# Patient Record
Sex: FEMALE | Race: WHITE | Employment: OTHER | URBAN - METROPOLITAN AREA
[De-identification: names, ages, dates, MRNs, and addresses within clinical notes are randomized per-mention and may not be internally consistent; named-entity substitution may affect disease eponyms.]

---

## 2017-06-06 RX ORDER — METFORMIN HYDROCHLORIDE 500 MG/1
TABLET ORAL
Qty: 180 TAB | Refills: 3 | Status: SHIPPED | OUTPATIENT
Start: 2017-06-06 | End: 2018-07-01 | Stop reason: SDUPTHER

## 2017-06-23 ENCOUNTER — HOSPITAL ENCOUNTER (OUTPATIENT)
Dept: LAB | Age: 72
Discharge: HOME | End: 2017-06-23
Payer: MEDICARE

## 2017-06-23 ENCOUNTER — OFFICE VISIT (OUTPATIENT)
Dept: FAMILY MEDICINE CLINIC | Age: 72
End: 2017-06-23

## 2017-06-23 VITALS
HEART RATE: 89 BPM | TEMPERATURE: 98.2 F | RESPIRATION RATE: 20 BRPM | BODY MASS INDEX: 26.84 KG/M2 | OXYGEN SATURATION: 98 % | SYSTOLIC BLOOD PRESSURE: 138 MMHG | DIASTOLIC BLOOD PRESSURE: 82 MMHG | HEIGHT: 66 IN | WEIGHT: 167 LBS

## 2017-06-23 DIAGNOSIS — E78.5 HYPERLIPIDEMIA, UNSPECIFIED HYPERLIPIDEMIA TYPE: ICD-10-CM

## 2017-06-23 DIAGNOSIS — F51.05 INSOMNIA SECONDARY TO ANXIETY: ICD-10-CM

## 2017-06-23 DIAGNOSIS — I10 ESSENTIAL HYPERTENSION: ICD-10-CM

## 2017-06-23 DIAGNOSIS — F41.9 INSOMNIA SECONDARY TO ANXIETY: ICD-10-CM

## 2017-06-23 DIAGNOSIS — E11.9 TYPE 2 DIABETES MELLITUS WITHOUT COMPLICATION, WITHOUT LONG-TERM CURRENT USE OF INSULIN (HCC): Primary | ICD-10-CM

## 2017-06-23 LAB — HBA1C MFR BLD HPLC: 7 %

## 2017-06-23 PROCEDURE — 36415 COLL VENOUS BLD VENIPUNCTURE: CPT

## 2017-06-23 PROCEDURE — 85025 COMPLETE CBC W/AUTO DIFF WBC: CPT

## 2017-06-23 PROCEDURE — 80053 COMPREHEN METABOLIC PANEL: CPT

## 2017-06-23 PROCEDURE — 84443 ASSAY THYROID STIM HORMONE: CPT

## 2017-06-23 PROCEDURE — 80061 LIPID PANEL: CPT

## 2017-06-23 RX ORDER — ALPRAZOLAM 0.5 MG/1
0.5 TABLET ORAL
Qty: 30 TAB | Refills: 2 | Status: SHIPPED | OUTPATIENT
Start: 2017-06-23 | End: 2017-11-12 | Stop reason: SDUPTHER

## 2017-06-23 NOTE — PROGRESS NOTES
HPI:  67 y.o.  presents for follow up appointment. No hospital, ER or specialist visits since last primary care visit except as noted below. Patient Active Problem List    Diagnosis    Insomnia secondary to anxiety - does ok on unisom or xanax, but never together.  Diverticulosis - no recent flares.  Diabetes mellitus ([facility] [address] - Checks blood glucose 2-3 times a week and less than 140. Takes medications as directed. No polyuria/polydipsia. Opthalmology appointment due in August.  Checks feet, and no sores noted. No tingling or numbness in feet.  Hyperlipidemia - Takes medication at night as directed, no muscle aches. Tries to watch diet.  Hypertension - No home monitoring. Compliant with medication. No shortness of breath, no chest pain, no vision change, no headache, no lower extremity edema. Past Medical History:   Diagnosis Date    Diabetes ([facility] [address]     Hypercholesterolemia     Hypertension        Social History   Substance Use Topics    Smoking status: Never Smoker    Smokeless tobacco: Never Used    Alcohol use No       Outpatient Prescriptions Marked as Taking for the 6/23/17 encounter (Office Visit) with Hector Souza MD   Medication Sig Dispense Refill    ALPRAZolam Azeem Graces) 0.5 mg tablet Take 1 Tab by mouth nightly as needed for Anxiety. 30 Tab 2    metFORMIN (GLUCOPHAGE) 500 mg tablet TAKE 1 TAB BY MOUTH TWO (2) TIMES DAILY (WITH MEALS). 180 Tab 3    glipiZIDE (GLUCOTROL) 5 mg tablet TAKE 1 TABLET BY MOUTH DAILY 90 Tab 4    simvastatin (ZOCOR) 20 mg tablet TAKE 1 TABLET BY MOUTH NIGHTLY 90 Tab 4    glucose blood VI test strips (ACCU-CHEK MARY GRACE) strip Diagnosis E11.65. Pt is testing once daily. 100 Strip 1    ramipril (ALTACE) 10 mg capsule Take 1 Cap by mouth daily. 90 Cap 4       No Known Allergies    ROS:  ROS negative except as per HPI.       PE:  Visit Vitals    /82    Pulse 89    Temp 98.2 °F (36.8 °C) (Oral)    Resp 20    Ht 5' 6\" (1.676 m)  Wt 167 lb (75.8 kg)    LMP  (LMP Unknown)    SpO2 98%    BMI 26.95 kg/m2     Gen: alert, oriented, no acute distress  Head: normocephalic, atraumatic  Ears: external auditory canals clear, TMs without erythema or effusion  Eyes: pupils equal round reactive to light, sclera clear, conjunctiva clear  Oral: moist mucus membranes, no oral lesions, no pharyngeal inflammation or exudate  Neck: symmetric normal sized thyroid, no carotid bruits, no jugular vein distention  Resp: no increase work of breathing, lungs clear to ausculation bilaterally, no wheezing, rales or rhonchi  CV: S1, S2 normal.  No murmurs, rubs, or gallops. Abd: soft, not tender, not distended. No hepatosplenomegaly. Normal bowel sounds. Neuro: cranial nerves intact, normal strength and movement in all extremities, reflexes and sensation intact and symmetric. Skin: no lesion or rash  Extremities: no cyanosis or edema    Result Value Ref Range    Hemoglobin A1c (POC) 7.0 %       Assessment/Plan:    1. Type 2 diabetes mellitus without complication, without long-term current use of insulin (HCC)  A1C at upper end of goal, advised to work on diet and exercise habits, no changes to medications. - AMB POC HEMOGLOBIN A1C    2. Hyperlipidemia, unspecified hyperlipidemia type  No changes in medications pending lab results. - METABOLIC PANEL, COMPREHENSIVE  - LIPID PANEL    3. Essential hypertension  At goal.  Continue current medications. - CBC WITH AUTOMATED DIFF  - TSH 3RD GENERATION    4. Insomnia secondary to anxiety  Well controlled on current medications, no changes. No signs of abuse, overuse, misuse or diversion of benzodiazepine.  reviewed. - ALPRAZolam (XANAX) 0.5 mg tablet; Take 1 Tab by mouth nightly as needed for Anxiety. Dispense: 30 Tab; Refill: 2      Health Maintenance reviewed - updated.     Orders Placed This Encounter    METABOLIC PANEL, COMPREHENSIVE    CBC WITH AUTOMATED DIFF    LIPID PANEL    TSH 3RD GENERATION    CVD REPORT    AMB POC HEMOGLOBIN A1C    ALPRAZolam (XANAX) 0.5 mg tablet     Sig: Take 1 Tab by mouth nightly as needed for Anxiety. Dispense:  30 Tab     Refill:  2       Medications Discontinued During This Encounter   Medication Reason    ALPRAZolam (XANAX) 0.5 mg tablet Reorder       Current Outpatient Prescriptions   Medication Sig Dispense Refill    ALPRAZolam (XANAX) 0.5 mg tablet Take 1 Tab by mouth nightly as needed for Anxiety. 30 Tab 2    metFORMIN (GLUCOPHAGE) 500 mg tablet TAKE 1 TAB BY MOUTH TWO (2) TIMES DAILY (WITH MEALS). 180 Tab 3    glipiZIDE (GLUCOTROL) 5 mg tablet TAKE 1 TABLET BY MOUTH DAILY 90 Tab 4    simvastatin (ZOCOR) 20 mg tablet TAKE 1 TABLET BY MOUTH NIGHTLY 90 Tab 4    glucose blood VI test strips (ACCU-CHEK MARY GRACE) strip Diagnosis E11.65. Pt is testing once daily. 100 Strip 1    ramipril (ALTACE) 10 mg capsule Take 1 Cap by mouth daily. 90 Cap 4       Recommended healthy diet low in carbohydrates, fats, sodium and cholesterol. Recommended regular cardiovascular exercise 3-6 times per week for 30-60 minutes daily. Verbal and written instructions (see AVS) provided. Patient expresses understanding of diagnosis and treatment plan.

## 2017-06-23 NOTE — PATIENT INSTRUCTIONS

## 2017-06-23 NOTE — MR AVS SNAPSHOT
Visit Information Date & Time Provider Department Dept. Phone Encounter #  
 6/23/2017 10:45 AM Tg Camp, [address] [facility] [phone] [id number] Upcoming Health Maintenance Date Due  
 LIPID PANEL Q1 4/19/2017 HEMOGLOBIN A1C Q6M 5/8/2017 INFLUENZA AGE 9 TO ADULT 8/1/2017 EYE EXAM RETINAL OR DILATED Q1 8/16/2017 FOOT EXAM Q1 11/8/2017 MICROALBUMIN Q1 11/8/2017 MEDICARE YEARLY EXAM 11/9/2017 BREAST CANCER SCRN MAMMOGRAM 11/28/2017 GLAUCOMA SCREENING Q2Y 8/16/2018 COLONOSCOPY 6/25/2019 DTaP/Tdap/Td series (2 - Td) 4/19/2025 Allergies as of 6/23/2017  Review Complete On: 6/23/2017 By: Tg Camp MD  
 No Known Allergies Current Immunizations  Reviewed on 4/20/2016 Name Date Influenza High Dose Vaccine PF 10/31/2016 Influenza Vaccine 10/14/2015, 10/3/2013 Pneumococcal Conjugate (PCV-13) 5/18/2015 Pneumococcal Vaccine (Unspecified Type) 7/15/2011 Tdap 4/19/2015 Zoster Vaccine, Live 7/15/2011 Not reviewed this visit You Were Diagnosed With   
  
 Codes Comments Type 2 diabetes mellitus without complication, without long-term current use of insulin (HCC)    -  Primary ICD-10-CM: E11.9 ICD-9-CM: 250.00 Hyperlipidemia, unspecified hyperlipidemia type     ICD-10-CM: E78.5 ICD-9-CM: 272.4 Essential hypertension     ICD-10-CM: I10 
ICD-9-CM: 401.9 Insomnia secondary to anxiety     ICD-10-CM: F41.9, F51.05 
ICD-9-CM: 300.00, 327.02 Vitals BP Pulse Temp Resp Height(growth percentile) Weight(growth percentile) 138/82 89 98.2 °F (36.8 °C) (Oral) 20 5' 6\" (1.676 m) 167 lb (75.8 kg) LMP SpO2 BMI OB Status Smoking Status (LMP Unknown) 98% 26.95 kg/m2 Hysterectomy Never Smoker Vitals History BMI and BSA Data Body Mass Index Body Surface Area  
 26.95 kg/m 2 1.88 m 2 Preferred Pharmacy Pharmacy Name Phone [id number] [facility] [phone] Your Updated Medication List  
  
   
This list is accurate as of: 6/23/17 11:30 AM.  Always use your most recent med list.  
  
  
  
  
 ALPRAZolam 0.5 mg tablet Commonly known as:  Shabnam Dylan Take 1 Tab by mouth nightly as needed for Anxiety. glipiZIDE 5 mg tablet Commonly known as:  GLUCOTROL  
TAKE 1 TABLET BY MOUTH DAILY  
  
 glucose blood VI test strips strip Commonly known as:  ACCU-CHEK MARY GRACE Diagnosis E11.65. Pt is testing once daily. metFORMIN 500 mg tablet Commonly known as:  GLUCOPHAGE  
TAKE 1 TAB BY MOUTH TWO (2) TIMES DAILY (WITH MEALS). ramipril 10 mg capsule Commonly known as:  ALTACE Take 1 Cap by mouth daily. simvastatin 20 mg tablet Commonly known as:  ZOCOR  
TAKE 1 TABLET BY MOUTH NIGHTLY Prescriptions Printed Refills ALPRAZolam (XANAX) 0.5 mg tablet 2 Sig: Take 1 Tab by mouth nightly as needed for Anxiety. Class: Print Route: Oral  
  
We Performed the Following AMB POC HEMOGLOBIN A1C [59078 CPT(R)] CBC WITH AUTOMATED DIFF [96365 CPT(R)] LIPID PANEL [64497 CPT(R)] METABOLIC PANEL, COMPREHENSIVE [03442 CPT(R)] TSH 3RD GENERATION [75527 CPT(R)] Patient Instructions Learning About Diabetes Food Guidelines Your Care Instructions Meal planning is important to manage diabetes. It helps keep your blood sugar at a target level (which you set with your doctor). You don't have to eat special foods. You can eat what your family eats, including sweets once in a while. But you do have to pay attention to how often you eat and how much you eat of certain foods. You may want to work with a dietitian or a certified diabetes educator (CDE) to help you plan meals and snacks. A dietitian or CDE can also help you lose weight if that is one of your goals. What should you know about eating carbs? Managing the amount of carbohydrate (carbs) you eat is an important part of healthy meals when you have diabetes. Carbohydrate is found in many foods. · Learn which foods have carbs. And learn the amounts of carbs in different foods. ¨ Bread, cereal, pasta, and rice have about 15 grams of carbs in a serving. A serving is 1 slice of bread (1 ounce), ½ cup of cooked cereal, or 1/3 cup of cooked pasta or rice. ¨ Fruits have 15 grams of carbs in a serving. A serving is 1 small fresh fruit, such as an apple or orange; ½ of a banana; ½ cup of cooked or canned fruit; ½ cup of fruit juice; 1 cup of melon or raspberries; or 2 tablespoons of dried fruit. ¨ Milk and no-sugar-added yogurt have 15 grams of carbs in a serving. A serving is 1 cup of milk or 2/3 cup of no-sugar-added yogurt. ¨ Starchy vegetables have 15 grams of carbs in a serving. A serving is ½ cup of mashed potatoes or sweet potato; 1 cup winter squash; ½ of a small baked potato; ½ cup of cooked beans; or ½ cup cooked corn or green peas. · Learn how much carbs to eat each day and at each meal. A dietitian or CDE can teach you how to keep track of the amount of carbs you eat. This is called carbohydrate counting. · If you are not sure how to count carbohydrate grams, use the Plate Method to plan meals. It is a good, quick way to make sure that you have a balanced meal. It also helps you spread carbs throughout the day. ¨ Divide your plate by types of foods. Put non-starchy vegetables on half the plate, meat or other protein food on one-quarter of the plate, and a grain or starchy vegetable in the final quarter of the plate.  To this you can add a small piece of fruit and 1 cup of milk or yogurt, depending on how many carbs you are supposed to eat at a meal. 
· Try to eat about the same amount of carbs at each meal. Do not \"save up\" your daily allowance of carbs to eat at one meal. 
 · Proteins have very little or no carbs per serving. Examples of proteins are beef, chicken, turkey, fish, eggs, tofu, cheese, cottage cheese, and peanut butter. A serving size of meat is 3 ounces, which is about the size of a deck of cards. Examples of meat substitute serving sizes (equal to 1 ounce of meat) are 1/4 cup of cottage cheese, 1 egg, 1 tablespoon of peanut butter, and ½ cup of tofu. How can you eat out and still eat healthy? · Learn to estimate the serving sizes of foods that have carbohydrate. If you measure food at home, it will be easier to estimate the amount in a serving of restaurant food. · If the meal you order has too much carbohydrate (such as potatoes, corn, or baked beans), ask to have a low-carbohydrate food instead. Ask for a salad or green vegetables. · If you use insulin, check your blood sugar before and after eating out to help you plan how much to eat in the future. · If you eat more carbohydrate at a meal than you had planned, take a walk or do other exercise. This will help lower your blood sugar. What else should you know? · Limit saturated fat, such as the fat from meat and dairy products. This is a healthy choice because people who have diabetes are at higher risk of heart disease. So choose lean cuts of meat and nonfat or low-fat dairy products. Use olive or canola oil instead of butter or shortening when cooking. · Don't skip meals. Your blood sugar may drop too low if you skip meals and take insulin or certain medicines for diabetes. · Check with your doctor before you drink alcohol. Alcohol can cause your blood sugar to drop too low. Alcohol can also cause a bad reaction if you take certain diabetes medicines. Follow-up care is a key part of your treatment and safety. Be sure to make and go to all appointments, and call your doctor if you are having problems. It's also a good idea to know your test results and keep a list of the medicines you take. Where can you learn more? Go to http://enrique-jaspreet.info/. Enter F896 in the search box to learn more about \"Learning About Diabetes Food Guidelines. \" Current as of: March 13, 2017 Content Version: 11.3 © 7855-8720 [employer]. Care instructions adapted under license by [employer] (which disclaims liability or warranty for this information). If you have questions about a medical condition or this instruction, always ask your healthcare professional. [address] any warranty or liability for your use of this information. Introducing [facility] & HEALTH SERVICES! Dear Leonor Gray: 
Thank you for requesting a Uberpong account. Our records indicate that you already have an active Uberpong account. You can access your account anytime at [email or web address] Did you know that you can access your hospital and ER discharge instructions at any time in Uberpong? You can also review all of your test results from your hospital stay or ER visit. Additional Information If you have questions, please visit the Frequently Asked Questions section of the Uberpong website at [email or web address] Remember, Uberpong is NOT to be used for urgent needs. For medical emergencies, dial 911. Now available from your iPhone and Android! Please provide this summary of care documentation to your next provider. Your primary care clinician is listed as Juan Manuel Herzog. If you have any questions after today's visit, please call [phone].

## 2017-06-23 NOTE — PROGRESS NOTES
Chief Complaint   Patient presents with    Medication Evaluation     check up on her medications     Morning meds taken this Chasity Borja LPN

## 2017-06-24 LAB
ALBUMIN SERPL-MCNC: 4.5 G/DL (ref 3.5–4.8)
ALBUMIN/GLOB SERPL: 1.7 {RATIO} (ref 1.2–2.2)
ALP SERPL-CCNC: 80 IU/L (ref 39–117)
ALT SERPL-CCNC: 15 IU/L (ref 0–32)
AST SERPL-CCNC: 14 IU/L (ref 0–40)
BASOPHILS # BLD AUTO: 0 X10E3/UL (ref 0–0.2)
BASOPHILS NFR BLD AUTO: 1 %
BILIRUB SERPL-MCNC: 0.4 MG/DL (ref 0–1.2)
BUN SERPL-MCNC: 12 MG/DL (ref 8–27)
BUN/CREAT SERPL: 16 (ref 12–28)
CALCIUM SERPL-MCNC: 9.7 MG/DL (ref 8.7–10.3)
CHLORIDE SERPL-SCNC: 100 MMOL/L (ref 96–106)
CHOLEST SERPL-MCNC: 168 MG/DL (ref 100–199)
CO2 SERPL-SCNC: 22 MMOL/L (ref 18–29)
CREAT SERPL-MCNC: 0.76 MG/DL (ref 0.57–1)
EOSINOPHIL # BLD AUTO: 0.2 X10E3/UL (ref 0–0.4)
EOSINOPHIL NFR BLD AUTO: 4 %
ERYTHROCYTE [DISTWIDTH] IN BLOOD BY AUTOMATED COUNT: 12.7 % (ref 12.3–15.4)
GLOBULIN SER CALC-MCNC: 2.6 G/DL (ref 1.5–4.5)
GLUCOSE SERPL-MCNC: 160 MG/DL (ref 65–99)
HCT VFR BLD AUTO: 40.5 % (ref 34–46.6)
HDLC SERPL-MCNC: 43 MG/DL
HGB BLD-MCNC: 13.6 G/DL (ref 11.1–15.9)
IMM GRANULOCYTES # BLD: 0 X10E3/UL (ref 0–0.1)
IMM GRANULOCYTES NFR BLD: 0 %
INTERPRETATION, 910389: NORMAL
LDLC SERPL CALC-MCNC: 94 MG/DL (ref 0–99)
LYMPHOCYTES # BLD AUTO: 1.7 X10E3/UL (ref 0.7–3.1)
LYMPHOCYTES NFR BLD AUTO: 30 %
MCH RBC QN AUTO: 29.6 PG (ref 26.6–33)
MCHC RBC AUTO-ENTMCNC: 33.6 G/DL (ref 31.5–35.7)
MCV RBC AUTO: 88 FL (ref 79–97)
MONOCYTES # BLD AUTO: 0.3 X10E3/UL (ref 0.1–0.9)
MONOCYTES NFR BLD AUTO: 6 %
NEUTROPHILS # BLD AUTO: 3.4 X10E3/UL (ref 1.4–7)
NEUTROPHILS NFR BLD AUTO: 59 %
PLATELET # BLD AUTO: 232 X10E3/UL (ref 150–379)
POTASSIUM SERPL-SCNC: 4.7 MMOL/L (ref 3.5–5.2)
PROT SERPL-MCNC: 7.1 G/DL (ref 6–8.5)
RBC # BLD AUTO: 4.59 X10E6/UL (ref 3.77–5.28)
SODIUM SERPL-SCNC: 140 MMOL/L (ref 134–144)
TRIGL SERPL-MCNC: 155 MG/DL (ref 0–149)
TSH SERPL DL<=0.005 MIU/L-ACNC: 2.93 UIU/ML (ref 0.45–4.5)
VLDLC SERPL CALC-MCNC: 31 MG/DL (ref 5–40)
WBC # BLD AUTO: 5.7 X10E3/UL (ref 3.4–10.8)

## 2017-07-29 DIAGNOSIS — I10 ESSENTIAL HYPERTENSION WITH GOAL BLOOD PRESSURE LESS THAN 140/90: ICD-10-CM

## 2017-07-30 RX ORDER — RAMIPRIL 10 MG/1
CAPSULE ORAL
Qty: 90 CAP | Refills: 4 | Status: SHIPPED | OUTPATIENT
Start: 2017-07-30 | End: 2018-08-15 | Stop reason: SDUPTHER

## 2017-09-06 RX ORDER — SIMVASTATIN 20 MG/1
TABLET, FILM COATED ORAL
Qty: 90 TAB | Refills: 3 | Status: SHIPPED | OUTPATIENT
Start: 2017-09-06 | End: 2018-07-01 | Stop reason: SDUPTHER

## 2017-09-07 RX ORDER — GLIPIZIDE 5 MG/1
TABLET ORAL
Qty: 90 TAB | Refills: 3 | Status: SHIPPED | OUTPATIENT
Start: 2017-09-07 | End: 2018-07-01 | Stop reason: SDUPTHER

## 2017-11-12 DIAGNOSIS — F51.05 INSOMNIA SECONDARY TO ANXIETY: ICD-10-CM

## 2017-11-12 DIAGNOSIS — F41.9 INSOMNIA SECONDARY TO ANXIETY: ICD-10-CM

## 2017-11-13 RX ORDER — ALPRAZOLAM 0.5 MG/1
0.5 TABLET ORAL
Qty: 30 TAB | Refills: 2 | Status: SHIPPED | OUTPATIENT
Start: 2017-11-13 | End: 2018-03-10 | Stop reason: SDUPTHER

## 2017-11-13 NOTE — TELEPHONE ENCOUNTER
From: Donita Montes  To: Jeremy Foster MD  Sent: 11/12/2017 5:54 PM EST  Subject: Medication Renewal Request    Original authorizing provider: MD Donita Newberry would like a refill of the following medications:  ALPRAZolam Mehran Brisk) 0.5 mg tablet Jeremy Foster MD]    Preferred pharmacy: [facility]/PHARMACY #[id number] - [address] , [address] [place] [address]:

## 2018-01-15 ENCOUNTER — OFFICE VISIT (OUTPATIENT)
Dept: FAMILY MEDICINE CLINIC | Age: 73
End: 2018-01-15

## 2018-01-15 ENCOUNTER — HOSPITAL ENCOUNTER (OUTPATIENT)
Dept: LAB | Age: 73
Discharge: HOME | End: 2018-01-15
Payer: MEDICARE

## 2018-01-15 VITALS
SYSTOLIC BLOOD PRESSURE: 126 MMHG | HEART RATE: 87 BPM | RESPIRATION RATE: 17 BRPM | OXYGEN SATURATION: 97 % | WEIGHT: 170 LBS | TEMPERATURE: 97.9 F | BODY MASS INDEX: 27.32 KG/M2 | DIASTOLIC BLOOD PRESSURE: 72 MMHG | HEIGHT: 66 IN

## 2018-01-15 DIAGNOSIS — Z00.00 MEDICARE ANNUAL WELLNESS VISIT, SUBSEQUENT: Primary | ICD-10-CM

## 2018-01-15 DIAGNOSIS — E78.5 HYPERLIPIDEMIA, UNSPECIFIED HYPERLIPIDEMIA TYPE: ICD-10-CM

## 2018-01-15 DIAGNOSIS — I10 ESSENTIAL HYPERTENSION: ICD-10-CM

## 2018-01-15 DIAGNOSIS — E11.9 TYPE 2 DIABETES MELLITUS WITHOUT COMPLICATION, WITHOUT LONG-TERM CURRENT USE OF INSULIN (HCC): ICD-10-CM

## 2018-01-15 DIAGNOSIS — Z13.39 SCREENING FOR ALCOHOLISM: ICD-10-CM

## 2018-01-15 LAB
ALBUMIN UR QL STRIP: 10 MG/L
CREATININE, URINE POC: 10 MG/DL
HBA1C MFR BLD HPLC: 7.3 % (ref 4.8–5.6)
MICROALBUMIN/CREAT RATIO POC: NORMAL MG/G

## 2018-01-15 PROCEDURE — 84443 ASSAY THYROID STIM HORMONE: CPT

## 2018-01-15 PROCEDURE — 85025 COMPLETE CBC W/AUTO DIFF WBC: CPT

## 2018-01-15 PROCEDURE — 80061 LIPID PANEL: CPT

## 2018-01-15 PROCEDURE — 36415 COLL VENOUS BLD VENIPUNCTURE: CPT

## 2018-01-15 PROCEDURE — 80053 COMPREHEN METABOLIC PANEL: CPT

## 2018-01-15 NOTE — PATIENT INSTRUCTIONS

## 2018-01-15 NOTE — MR AVS SNAPSHOT
[address] [facility] 
 
 
 [address] 
[phone] Patient: Kevin Cr MRN: [id number]:[id number] Visit Information Date & Time Provider Department Dept. Phone Encounter #  
 1/15/2018  2:30 PM Evans Arvizu MD [address] [facility] [address] [phone] [id number] Follow-up Instructions Return in about 6 months (around 7/15/2018). Upcoming Health Maintenance Date Due  
 FOOT EXAM Q1 11/8/2017 MICROALBUMIN Q1 11/8/2017 MEDICARE YEARLY EXAM 11/9/2017 HEMOGLOBIN A1C Q6M 12/23/2017 LIPID PANEL Q1 6/23/2018 EYE EXAM RETINAL OR DILATED Q1 11/15/2018 BREAST CANCER SCRN MAMMOGRAM 11/30/2018 COLONOSCOPY 6/25/2019 GLAUCOMA SCREENING Q2Y 11/15/2019 DTaP/Tdap/Td series (2 - Td) 4/19/2025 Allergies as of 1/15/2018  Review Complete On: 1/15/2018 By: Evans Arvizu MD  
 No Known Allergies Current Immunizations  Reviewed on 11/20/2017 Name Date Influenza High Dose Vaccine PF 11/14/2017, 10/31/2016 Influenza Vaccine 10/14/2015, 10/3/2013 Pneumococcal Conjugate (PCV-13) 5/18/2015 Pneumococcal Vaccine (Unspecified Type) 7/15/2011 Tdap 4/19/2015 Zoster Vaccine, Live 7/15/2011 Not reviewed this visit You Were Diagnosed With   
  
 Codes Comments Medicare annual wellness visit, subsequent    -  Primary ICD-10-CM: Z00.00 ICD-9-CM: V70.0 Screening for alcoholism     ICD-10-CM: Z13.89 ICD-9-CM: V79.1 Type 2 diabetes mellitus without complication, without long-term current use of insulin (HCC)     ICD-10-CM: E11.9 ICD-9-CM: 250.00 Hyperlipidemia, unspecified hyperlipidemia type     ICD-10-CM: E78.5 ICD-9-CM: 272.4 Essential hypertension     ICD-10-CM: I10 
ICD-9-CM: 401.9 Vitals BP Pulse Temp Resp Height(growth percentile) Weight(growth percentile) 126/72 87 97.9 °F (36.6 °C) (Oral) 17 5' 6\" (1.676 m) 170 lb (77.1 kg) LMP SpO2 BMI OB Status Smoking Status (LMP Unknown) 97% 27.44 kg/m2 Hysterectomy Never Smoker Vitals History BMI and BSA Data Body Mass Index Body Surface Area  
 27.44 kg/m 2 1.89 m 2 Preferred Pharmacy Pharmacy Name Phone [id number] [facility] [phone] Your Updated Medication List  
  
   
This list is accurate as of: 1/15/18  3:26 PM.  Always use your most recent med list.  
  
  
  
  
 ALPRAZolam 0.5 mg tablet Commonly known as:  Blinda Scot Take 1 Tab by mouth nightly as needed for Anxiety. glipiZIDE 5 mg tablet Commonly known as:  GLUCOTROL  
TAKE 1 TABLET BY MOUTH DAILY  
  
 glucose blood VI test strips strip Commonly known as:  ACCU-CHEK MARY GRACE Diagnosis E11.65. Pt is testing once daily. metFORMIN 500 mg tablet Commonly known as:  GLUCOPHAGE  
TAKE 1 TAB BY MOUTH TWO (2) TIMES DAILY (WITH MEALS). ramipril 10 mg capsule Commonly known as:  ALTACE  
TAKE ONE CAPSULE BY MOUTH EVERY DAY  
  
 simvastatin 20 mg tablet Commonly known as:  ZOCOR  
TAKE 1 TABLET BY MOUTH NIGHTLY We Performed the Following AMB POC HEMOGLOBIN A1C [46816 CPT(R)] AMB POC URINE, MICROALBUMIN, SEMIQUANT (3 RESULTS) [68333 CPT(R)] CBC WITH AUTOMATED DIFF [17629 CPT(R)]  DIABETES FOOT EXAM [HM7 Custom] LIPID PANEL [66417 CPT(R)] METABOLIC PANEL, COMPREHENSIVE [50022 CPT(R)] [place] ANNUAL ALCOHOL SCREEN 15 MIN [id number] [facility]] TSH 3RD GENERATION [48918 CPT(R)] Follow-up Instructions Return in about 6 months (around 7/15/2018). Patient Instructions Medicare Wellness Visit, Female The best way to live healthy is to have a healthy lifestyle by eating a well-balanced diet, exercising regularly, limiting alcohol and stopping smoking.  
 
Regular physical exams and screening tests are another way to keep healthy. Preventive exams provided by your health care provider can find health problems before they become diseases or illnesses. Preventive services including immunizations, screening tests, monitoring and exams can help you take care of your own health. All people over age 72 should have a pneumovax  and and a prevnar shot to prevent pneumonia. These are once in a lifetime unless you and your provider decide differently. All people over 65 should have a yearly flu shot and a tetanus vaccine every 10 years. A bone mass density to screen for osteoporosis or thinning of the bones should be done every 2 years after 65. Screening for diabetes mellitus with a blood sugar test should be done every year. Glaucoma is a disease of the eye due to increased ocular pressure that can lead to blindness and it should be done every year by an eye professional. 
 
Cardiovascular screening tests that check for elevated lipids (fatty part of blood) which can lead to heart disease and strokes should be done every 5 years. Colorectal screening that evaluates for blood or polyps in your colon should be done yearly as a stool test or every five years as a flexible sigmoidoscope or every 10 years as a colonoscopy up to age 76. Breast cancer screening with a mammogram is recommended biennially  for women age 54-69. Screening for cervical cancer with a pap smear and pelvic exam is recommended for women after age 72 years every 2 years up to age 79 or when the provider and patient decide to stop. If there is a history of cervical abnormalities or other increased risk for cancer then the test is recommended yearly. Hepatitis C screening is also recommended for anyone born between [birth year] through [address]. A shingles vaccine is also recommended once in a lifetime after age 61. Your Medicare Wellness Exam is recommended annually. Here is a list of your current Health Maintenance items with a due date: Health Maintenance Due Topic Date Due  Up to date    Introducing [facility] & HEALTH SERVICES! Dear Tj Reach: 
Thank you for requesting a Occlutech account. Our records indicate that you already have an active Occlutech account. You can access your account anytime at [email or web address] Did you know that you can access your hospital and ER discharge instructions at any time in Occlutech? You can also review all of your test results from your hospital stay or ER visit. Additional Information If you have questions, please visit the Frequently Asked Questions section of the Occlutech website at [email or web address] Remember, Occlutech is NOT to be used for urgent needs. For medical emergencies, dial 911. Now available from your iPhone and Android! Please provide this summary of care documentation to your next provider. Your primary care clinician is listed as Marcelo Gomez. If you have any questions after today's visit, please call [phone].

## 2018-01-15 NOTE — PROGRESS NOTES
This is a Subsequent Medicare Annual Wellness Exam (AWV) (Performed 12 months after IPPE or effective date of Medicare Part B enrollment)    I have reviewed the patient's medical history in detail and updated the computerized patient record. History     Past Medical History:   Diagnosis Date    Diabetes ([facility] [address]     Hypercholesterolemia     Hypertension       Past Surgical History:   Procedure Laterality Date    HX CATARACT REMOVAL Bilateral 2015    HX CHOLECYSTECTOMY      HX DILATION AND CURETTAGE      HX HYSTERECTOMY      complete     Current Outpatient Prescriptions   Medication Sig Dispense Refill    ALPRAZolam (XANAX) 0.5 mg tablet Take 1 Tab by mouth nightly as needed for Anxiety. 30 Tab 2    glipiZIDE (GLUCOTROL) 5 mg tablet TAKE 1 TABLET BY MOUTH DAILY 90 Tab 3    simvastatin (ZOCOR) 20 mg tablet TAKE 1 TABLET BY MOUTH NIGHTLY 90 Tab 3    ramipril (ALTACE) 10 mg capsule TAKE ONE CAPSULE BY MOUTH EVERY DAY 90 Cap 4    metFORMIN (GLUCOPHAGE) 500 mg tablet TAKE 1 TAB BY MOUTH TWO (2) TIMES DAILY (WITH MEALS). 180 Tab 3    glucose blood VI test strips (ACCU-CHEK MARY GRACE) strip Diagnosis E11.65. Pt is testing once daily. 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     Functional Ability and Level of Safety:   Hearing Loss  Hearing is good. Activities of Daily Living  The home contains: no safety equipment. Fall Risk  Fall Risk Assessment, last 12 mths 1/15/2018   Able to walk? Yes   Fall in past 12 months? No       Abuse Screen  Patient is not abused    Cognitive Screening   Evaluation of Cognitive Function:  Has your family/caregiver stated any concerns about your memory: no      Patient Care Team   Patient Care Team:  Fifi Holland MD as PCP - General (Internal Medicine)    Assessment/Plan   Education and counseling provided:  Are appropriate based on today's review and evaluation  End-of-Life planning (with patient's consent)  Pneumococcal Vaccine  Influenza Vaccine  Colorectal cancer screening tests    Diagnoses and all orders for this visit:    1. Medicare annual wellness visit, subsequent    2. Screening for alcoholism  -     Annual  Alcohol Screen 15 min ()      Health Maintenance Due   Topic Date Due    FOOT EXAM Q1  11/08/2017    MICROALBUMIN Q1  11/08/2017    MEDICARE YEARLY EXAM  11/09/2017    HEMOGLOBIN A1C Q6M  12/23/2017         HPI:  Baylee Anguiano also presents for follow up of chronic conditions. Patient Active Problem List    Diagnosis    Insomnia secondary to anxiety - intermittently uses xanax    Diverticulosis    Diabetes mellitus ([facility] [address] - Checks blood glucose each morning and usually 130s. Takes medications as directed. No polyuria/polydipsia. Opthalmology appointment this year. Checks feet, and no sores noted. No tingling or numbness in feet.  Hyperlipidemia - Takes medication at night as directed, no muscle aches. Tries to watch diet.  Hypertension - No home monitoring. Compliant with medication. No shortness of breath, no chest pain, no vision change, no headache, no lower extremity edema. See Past Medical History, Surgical History, Social History, Medications, and Allergies documented above. ROS:  ROS negative except as per HPI.     PE:  Visit Vitals    /84 (BP 1 Location: Left arm, BP Patient Position: Sitting)    Pulse 87    Temp 97.9 °F (36.6 °C) (Oral)    Resp 17    Ht 5' 6\" (1.676 m)    Wt 170 lb (77.1 kg)    LMP  (LMP Unknown)    SpO2 97%    BMI 27.44 kg/m2     Gen: alert, oriented, no acute distress  Head: normocephalic, atraumatic  Ears: external auditory canals clear, TMs without erythema or effusion  Eyes: pupils equal round reactive to light, sclera clear, conjunctiva clear  Oral: moist mucus membranes, no oral lesions, no pharyngeal inflammation or exudate  Neck: symmetric normal sized thyroid, no carotid bruits, no jugular vein distention  Resp: no increase work of breathing, lungs clear to ausculation bilaterally, no wheezing, rales or rhonchi  CV: S1, S2 normal.  No murmurs, rubs, or gallops. Abd: soft, not tender, not distended. No hepatosplenomegaly. Normal bowel sounds. Neuro: cranial nerves intact, normal strength and movement in all extremities, reflexes and sensation intact and symmetric. Skin: lipoma mid back, sebaceous cyst mid back, nontender. Extremities: no cyanosis or edema    No foot deformities. No calluses or ulcers. Pulses intact. Reflexes intact. Sensation intact to microfilament. Normal proprioception. Results for orders placed or performed in visit on 01/15/18   AMB POC HEMOGLOBIN A1C   Result Value Ref Range    Hemoglobin A1c (POC) 7.3 (A) 4.8 - 5.6 %       Assessment/Plan:    3. Type 2 diabetes mellitus without complication, without long-term current use of insulin (HCC)  A little above goal, will get back on diet. Continue current medications. Microalbumin pending. Foot exam normal.  - AMB POC HEMOGLOBIN A1C  - AMB POC URINE, MICROALBUMIN, SEMIQUANT (3 RESULTS)    4. Hyperlipidemia, unspecified hyperlipidemia type  No changes in medications pending lab results. 5. Essential hypertension  At goal.  Continue current medications.      6. Lipoma and sebaceous cyst  Does not want them removed at this time. Orders Placed This Encounter    AMB POC HEMOGLOBIN A1C    AMB POC URINE, MICROALBUMIN, SEMIQUANT (3 RESULTS)    Annual  Alcohol Screen 15 min ()       There are no discontinued medications. Current Outpatient Prescriptions   Medication Sig Dispense Refill    ALPRAZolam (XANAX) 0.5 mg tablet Take 1 Tab by mouth nightly as needed for Anxiety. 30 Tab 2    glipiZIDE (GLUCOTROL) 5 mg tablet TAKE 1 TABLET BY MOUTH DAILY 90 Tab 3    simvastatin (ZOCOR) 20 mg tablet TAKE 1 TABLET BY MOUTH NIGHTLY 90 Tab 3    ramipril (ALTACE) 10 mg capsule TAKE ONE CAPSULE BY MOUTH EVERY DAY 90 Cap 4    metFORMIN (GLUCOPHAGE) 500 mg tablet TAKE 1 TAB BY MOUTH TWO (2) TIMES DAILY (WITH MEALS). 180 Tab 3    glucose blood VI test strips (ACCU-CHEK MARY GRACE) strip Diagnosis E11.65. Pt is testing once daily. 100 Strip 1       Recommended healthy diet low in carbohydrates, fats, sodium and cholesterol. Recommended regular cardiovascular exercise 3-6 times per week for 30-60 minutes daily. Verbal and written instructions (see AVS) provided. Patient expresses understanding of diagnosis and treatment plan.

## 2018-01-15 NOTE — PROGRESS NOTES
Chief Complaint   Patient presents with    Mass     on back and neck     Annual Wellness Visit       Health Maintenance reviewed

## 2018-01-16 LAB
ALBUMIN SERPL-MCNC: 4.6 G/DL (ref 3.5–4.8)
ALBUMIN/GLOB SERPL: 1.7 {RATIO} (ref 1.2–2.2)
ALP SERPL-CCNC: 90 IU/L (ref 39–117)
ALT SERPL-CCNC: 15 IU/L (ref 0–32)
AST SERPL-CCNC: 15 IU/L (ref 0–40)
BASOPHILS # BLD AUTO: 0 X10E3/UL (ref 0–0.2)
BASOPHILS NFR BLD AUTO: 1 %
BILIRUB SERPL-MCNC: 0.5 MG/DL (ref 0–1.2)
BUN SERPL-MCNC: 14 MG/DL (ref 8–27)
BUN/CREAT SERPL: 21 (ref 12–28)
CALCIUM SERPL-MCNC: 10 MG/DL (ref 8.7–10.3)
CHLORIDE SERPL-SCNC: 101 MMOL/L (ref 96–106)
CHOLEST SERPL-MCNC: 196 MG/DL (ref 100–199)
CO2 SERPL-SCNC: 26 MMOL/L (ref 18–29)
CREAT SERPL-MCNC: 0.66 MG/DL (ref 0.57–1)
EOSINOPHIL # BLD AUTO: 0.1 X10E3/UL (ref 0–0.4)
EOSINOPHIL NFR BLD AUTO: 2 %
ERYTHROCYTE [DISTWIDTH] IN BLOOD BY AUTOMATED COUNT: 12.6 % (ref 12.3–15.4)
GLOBULIN SER CALC-MCNC: 2.7 G/DL (ref 1.5–4.5)
GLUCOSE SERPL-MCNC: 112 MG/DL (ref 65–99)
HCT VFR BLD AUTO: 39.7 % (ref 34–46.6)
HDLC SERPL-MCNC: 45 MG/DL
HGB BLD-MCNC: 13.1 G/DL (ref 11.1–15.9)
IMM GRANULOCYTES # BLD: 0 X10E3/UL (ref 0–0.1)
IMM GRANULOCYTES NFR BLD: 0 %
INTERPRETATION, 910389: NORMAL
LDLC SERPL CALC-MCNC: 113 MG/DL (ref 0–99)
LYMPHOCYTES # BLD AUTO: 2.4 X10E3/UL (ref 0.7–3.1)
LYMPHOCYTES NFR BLD AUTO: 33 %
Lab: NORMAL
MCH RBC QN AUTO: 28.9 PG (ref 26.6–33)
MCHC RBC AUTO-ENTMCNC: 33 G/DL (ref 31.5–35.7)
MCV RBC AUTO: 87 FL (ref 79–97)
MONOCYTES # BLD AUTO: 0.4 X10E3/UL (ref 0.1–0.9)
MONOCYTES NFR BLD AUTO: 5 %
NEUTROPHILS # BLD AUTO: 4.4 X10E3/UL (ref 1.4–7)
NEUTROPHILS NFR BLD AUTO: 59 %
PLATELET # BLD AUTO: 305 X10E3/UL (ref 150–379)
POTASSIUM SERPL-SCNC: 5.3 MMOL/L (ref 3.5–5.2)
PROT SERPL-MCNC: 7.3 G/DL (ref 6–8.5)
RBC # BLD AUTO: 4.54 X10E6/UL (ref 3.77–5.28)
SODIUM SERPL-SCNC: 142 MMOL/L (ref 134–144)
TRIGL SERPL-MCNC: 192 MG/DL (ref 0–149)
TSH SERPL DL<=0.005 MIU/L-ACNC: 3.86 UIU/ML (ref 0.45–4.5)
VLDLC SERPL CALC-MCNC: 38 MG/DL (ref 5–40)
WBC # BLD AUTO: 7.3 X10E3/UL (ref 3.4–10.8)

## 2018-01-16 NOTE — PROGRESS NOTES
Labs look stable except cholesterol has climbed a little bet. Stay on your simvastatin.   Melva Castaneda MD

## 2018-01-17 ENCOUNTER — PATIENT MESSAGE (OUTPATIENT)
Dept: FAMILY MEDICINE CLINIC | Age: 73
End: 2018-01-17

## 2018-01-17 NOTE — TELEPHONE ENCOUNTER
From: Belkis Paz  To: Armin Louise MD  Sent: 1/17/2018 2:17 PM EST  Subject: Update Medical Information    Not sure if you have this info.  If not please update  Tetanus/whooping cough shot at [facility] 4-19-16  Secondary cataract surgery (right eye) 1-9-18 Dr. Albania Cerna  Secondary cataract surgery (left eye) will be done 1-23-18 Dr. Albania Cerna

## 2018-03-10 DIAGNOSIS — F41.9 INSOMNIA SECONDARY TO ANXIETY: ICD-10-CM

## 2018-03-10 DIAGNOSIS — F51.05 INSOMNIA SECONDARY TO ANXIETY: ICD-10-CM

## 2018-03-12 RX ORDER — ALPRAZOLAM 0.5 MG/1
0.5 TABLET ORAL
Qty: 30 TAB | Refills: 2 | Status: SHIPPED | OUTPATIENT
Start: 2018-03-12 | End: 2018-09-12 | Stop reason: SDUPTHER

## 2018-03-12 NOTE — TELEPHONE ENCOUNTER
From: Gracie Fontanez  To: Dore Bernheim, MD  Sent: 3/9/2018 10:22 PM EST  Subject: Medication Renewal Request    Original authorizing provider: Dore Bernheim, MD Lore Men would like a refill of the following medications:  glucose blood VI test strips (ACCU-CHEK MARY GRACE) strip Dore Bernheim, MD]    Preferred pharmacy: [facility]/PHARMACY #[id number] - [address] , [address] [place] [address]:

## 2018-03-12 NOTE — TELEPHONE ENCOUNTER
From: Dali Roach  To: Phyllis Pinto MD  Sent: 3/10/2018 12:42 AM EST  Subject: Medication Renewal Request    Original authorizing provider: MD Dali Flores would like a refill of the following medications:  ALPRAZolam Ernestene Links) 0.5 mg tablet Phyllis Pinto MD]    Preferred pharmacy: [facility]/PHARMACY #[id number] - [address], [address] [place] [address]:

## 2018-03-15 ENCOUNTER — TELEPHONE (OUTPATIENT)
Dept: FAMILY MEDICINE CLINIC | Age: 73
End: 2018-03-15

## 2018-03-15 DIAGNOSIS — E11.9 TYPE 2 DIABETES MELLITUS WITHOUT COMPLICATION, WITHOUT LONG-TERM CURRENT USE OF INSULIN (HCC): Primary | ICD-10-CM

## 2018-03-15 RX ORDER — BLOOD-GLUCOSE METER
EACH MISCELLANEOUS
Qty: 1 EACH | Refills: 0 | Status: SHIPPED | OUTPATIENT
Start: 2018-03-15

## 2018-03-15 NOTE — TELEPHONE ENCOUNTER
Pt needing a new script for a new meter and test strips as insurance is no longer covering accu-check. Scripts sent to [facility].

## 2018-07-02 RX ORDER — SIMVASTATIN 20 MG/1
TABLET, FILM COATED ORAL
Qty: 90 TAB | Refills: 0 | Status: SHIPPED | OUTPATIENT
Start: 2018-07-02 | End: 2018-11-13 | Stop reason: SDUPTHER

## 2018-07-02 RX ORDER — METFORMIN HYDROCHLORIDE 500 MG/1
TABLET ORAL
Qty: 180 TAB | Refills: 0 | Status: SHIPPED | OUTPATIENT
Start: 2018-07-02 | End: 2018-11-13 | Stop reason: SDUPTHER

## 2018-07-02 RX ORDER — GLIPIZIDE 5 MG/1
TABLET ORAL
Qty: 90 TAB | Refills: 0 | Status: SHIPPED | OUTPATIENT
Start: 2018-07-02 | End: 2018-11-13 | Stop reason: SDUPTHER

## 2018-08-15 DIAGNOSIS — I10 ESSENTIAL HYPERTENSION WITH GOAL BLOOD PRESSURE LESS THAN 140/90: ICD-10-CM

## 2018-08-16 RX ORDER — RAMIPRIL 10 MG/1
CAPSULE ORAL
Qty: 90 CAP | Refills: 0 | Status: SHIPPED | OUTPATIENT
Start: 2018-08-16 | End: 2018-11-13 | Stop reason: SDUPTHER

## 2018-09-12 DIAGNOSIS — F41.9 INSOMNIA SECONDARY TO ANXIETY: ICD-10-CM

## 2018-09-12 DIAGNOSIS — F51.05 INSOMNIA SECONDARY TO ANXIETY: ICD-10-CM

## 2018-09-12 NOTE — TELEPHONE ENCOUNTER
From: Kobe Arteaga  To: Kush Lopez MD  Sent: 9/12/2018 11:00 AM EDT  Subject: Medication Renewal Request    Original authorizing provider: MD Kobe Tejeda would like a refill of the following medications:  ALPRAZolam Stefany Snow) 0.5 mg tablet Kush Lopez MD]    Preferred pharmacy: [facility]/PHARMACY #[id number] - [address], [address] [place] [address]:

## 2018-09-12 NOTE — TELEPHONE ENCOUNTER
Last visit: 1/15/18 with Dr. Damir Brothers    Last refill: 3/12/18    : 3/13/18    2 refills had been remaining however, because it wasn't filled before , the rx has .

## 2018-09-13 ENCOUNTER — DOCUMENTATION ONLY (OUTPATIENT)
Dept: FAMILY MEDICINE CLINIC | Age: 73
End: 2018-09-13

## 2018-09-13 RX ORDER — ALPRAZOLAM 0.5 MG/1
0.5 TABLET ORAL
Qty: 30 TAB | Refills: 2 | Status: SHIPPED | OUTPATIENT
Start: 2018-09-13 | End: 2019-05-17 | Stop reason: SDUPTHER

## 2018-11-13 DIAGNOSIS — I10 ESSENTIAL HYPERTENSION WITH GOAL BLOOD PRESSURE LESS THAN 140/90: ICD-10-CM

## 2018-11-13 RX ORDER — METFORMIN HYDROCHLORIDE 500 MG/1
TABLET ORAL
Qty: 180 TAB | Refills: 0 | Status: SHIPPED | OUTPATIENT
Start: 2018-11-13 | End: 2019-02-09 | Stop reason: SDUPTHER

## 2018-11-13 RX ORDER — RAMIPRIL 10 MG/1
CAPSULE ORAL
Qty: 90 CAP | Refills: 1 | Status: SHIPPED | OUTPATIENT
Start: 2018-11-13 | End: 2019-05-09 | Stop reason: SDUPTHER

## 2018-11-13 RX ORDER — SIMVASTATIN 20 MG/1
TABLET, FILM COATED ORAL
Qty: 90 TAB | Refills: 0 | Status: SHIPPED | OUTPATIENT
Start: 2018-11-13 | End: 2019-02-09 | Stop reason: SDUPTHER

## 2018-11-13 RX ORDER — GLIPIZIDE 5 MG/1
TABLET ORAL
Qty: 90 TAB | Refills: 0 | Status: SHIPPED | OUTPATIENT
Start: 2018-11-13 | End: 2019-02-09 | Stop reason: SDUPTHER

## 2019-03-18 ENCOUNTER — OFFICE VISIT (OUTPATIENT)
Dept: INTERNAL MEDICINE CLINIC | Age: 74
End: 2019-03-18

## 2019-03-18 VITALS
RESPIRATION RATE: 16 BRPM | HEIGHT: 65 IN | TEMPERATURE: 97.9 F | HEART RATE: 102 BPM | WEIGHT: 163 LBS | DIASTOLIC BLOOD PRESSURE: 83 MMHG | BODY MASS INDEX: 27.16 KG/M2 | OXYGEN SATURATION: 99 % | SYSTOLIC BLOOD PRESSURE: 144 MMHG

## 2019-03-18 DIAGNOSIS — E11.65 TYPE 2 DIABETES MELLITUS WITH HYPERGLYCEMIA, WITHOUT LONG-TERM CURRENT USE OF INSULIN (HCC): ICD-10-CM

## 2019-03-18 DIAGNOSIS — E78.5 HYPERLIPIDEMIA ASSOCIATED WITH TYPE 2 DIABETES MELLITUS (HCC): ICD-10-CM

## 2019-03-18 DIAGNOSIS — E11.69 HYPERLIPIDEMIA ASSOCIATED WITH TYPE 2 DIABETES MELLITUS (HCC): ICD-10-CM

## 2019-03-18 DIAGNOSIS — Z00.00 MEDICARE ANNUAL WELLNESS VISIT, SUBSEQUENT: Primary | ICD-10-CM

## 2019-03-18 DIAGNOSIS — Z23 ENCOUNTER FOR IMMUNIZATION: ICD-10-CM

## 2019-03-18 DIAGNOSIS — I10 ESSENTIAL HYPERTENSION: ICD-10-CM

## 2019-03-18 DIAGNOSIS — M81.0 POSTMENOPAUSAL BONE LOSS: ICD-10-CM

## 2019-03-18 NOTE — PATIENT INSTRUCTIONS
Medicare Wellness Visit, Female     The best way to live healthy is to have a lifestyle where you eat a well-balanced diet, exercise regularly, limit alcohol use, and quit all forms of tobacco/nicotine, if applicable. Regular preventive services are another way to keep healthy. Preventive services (vaccines, screening tests, monitoring & exams) can help personalize your care plan, which helps you manage your own care. Screening tests can find health problems at the earliest stages, when they are easiest to treat. [facility] follows the current, evidence-based guidelines published by the [facility] ([facility]) when recommending preventive services for our patients. Because we follow these guidelines, sometimes recommendations change over time as research supports it. (For example, mammograms used to be recommended annually. Even though Medicare will still pay for an annual mammogram, the newer guidelines recommend a mammogram every two years for women of average risk.)  Of course, you and your doctor may decide to screen more often for some diseases, based on your risk and your health status. Preventive services for you include:  - Medicare offers their members a free annual wellness visit, which is time for you and your primary care provider to discuss and plan for your preventive service needs. Take advantage of this benefit every year!  -All adults over the age of 72 should receive the recommended pneumonia vaccines. Current USPSTF guidelines recommend a series of two vaccines for the best pneumonia protection.   -All adults should have a flu vaccine yearly and a tetanus vaccine every 10 years. All adults age 61 and older should receive a shingles vaccine once in their lifetime.    -A bone mass density test is recommended when a woman turns 65 to screen for osteoporosis. This test is only recommended one time, as a screening.  Some providers will use this same test as a disease monitoring tool if you already have osteoporosis. -All adults age 38-68 who are overweight should have a diabetes screening test once every three years.   -Other screening tests and preventive services for persons with diabetes include: an eye exam to screen for diabetic retinopathy, a kidney function test, a foot exam, and stricter control over your cholesterol.   -Cardiovascular screening for adults with routine risk involves an electrocardiogram (ECG) at intervals determined by your doctor.   -Colorectal cancer screenings should be done for adults age 54-65 with no increased risk factors for colorectal cancer. There are a number of acceptable methods of screening for this type of cancer. Each test has its own benefits and drawbacks. Discuss with your doctor what is most appropriate for you during your annual wellness visit. The different tests include: colonoscopy (considered the best screening method), a fecal occult blood test, a fecal DNA test, and sigmoidoscopy. -Breast cancer screenings are recommended every other year for women of normal risk, age 54-69.  -Cervical cancer screenings for women over age 72 are only recommended with certain risk factors.   -All adults born between [facility] should be screened once for Hepatitis C. Here is a list of your current Health Maintenance items (your personalized list of preventive services) with a due date:  Health Maintenance Due   Topic Date Due    Bone Mineral Density   06/13/2010    Hemoglobin A1C    07/15/2018    Mammogram  11/30/2018    Diabetic Foot Care  01/15/2019    Albumin Urine Test  01/15/2019    Cholesterol Test   01/15/2019    Annual Well Visit  01/16/2019    Colonoscopy  06/25/2019       Come back for fasting labs, lab opens 8 am, mon-fri. No appt needed.

## 2019-03-18 NOTE — PROGRESS NOTES
Bettie Stout is a 68 y.o. female who presents for evaluation of new pt visit, transfer of care. Used to see dr Julissa Downs, last saw her jan 2018. Doing well, no concerns. Has lost weight, about 30 lbs, since adjusted her diet about 18 months ago.       ROS:  Constitutional: negative for fevers, chills, anorexia and weight loss  Eyes:   negative for visual disturbance and irritation  ENT:   negative for tinnitus,sore throat,nasal congestion,ear pain,hoarseness  Respiratory:  negative for cough, hemoptysis, dyspnea,wheezing  CV:   negative for chest pain, palpitations, lower extremity edema  GI:   negative for nausea, vomiting, diarrhea, abdominal pain,melena  Genitourinary: negative for frequency, dysuria and hematuria  Musculoskel: negative for myalgias, arthralgias, back pain, muscle weakness, joint pain  Neurological:  negative for headaches, dizziness, focal weakness, numbness  Psychiatric:     Negative for depression or anxiety      Past Medical History:   Diagnosis Date    Diabetes ([facility] [address]     Hypercholesterolemia     Hypertension        Past Surgical History:   Procedure Laterality Date    HX CATARACT REMOVAL Bilateral 2015    HX CATARACT REMOVAL  01/09/2018    HX CHOLECYSTECTOMY      HX DILATION AND CURETTAGE      HX HYSTERECTOMY      complete       Family History   Adopted: Yes   Problem Relation Age of Onset    Cancer Daughter        Social History     Socioeconomic History    Marital status:      Spouse name: Not on file    Number of children: Not on file    Years of education: Not on file    Highest education level: Not on file   Social Needs    Financial resource strain: Not on file    Food insecurity - worry: Not on file    Food insecurity - inability: Not on file   Bioceptive needs - medical: Not on file   Bioceptive needs - non-medical: Not on file   Occupational History    Not on file   Tobacco Use    Smoking status: Never Smoker    Smokeless tobacco: Never Used   Substance and Sexual Activity    Alcohol use: No     Alcohol/week: 0.0 oz    Drug use: No    Sexual activity: Yes     Partners: Male     Birth control/protection: None   Other Topics Concern    Not on file   Social History Narrative    2 children  from cancer. Visit Vitals  /83 (BP 1 Location: Right arm, BP Patient Position: Sitting)   Pulse (!) 102   Temp 97.9 °F (36.6 °C) (Oral)   Resp 16   Ht 5' 4.75\" (1.645 m)   Wt 163 lb (73.9 kg)   LMP  (LMP Unknown)   SpO2 99%   BMI 27.33 kg/m²       Physical Examination:   General - Well appearing female  HEENT - PERRL, TM no erythema/opacification, normal nasal turbinates, no oropharyngeal erythema or exudate, MMM  Neck - supple, no bruits, no thyroidomegaly, no lymphadenopathy  Pulm - clear to auscultation bilaterally  Cardio - RRR, normal S1 S2, no murmur  Abd - soft, nontender, no masses, no HSM  Extrem - no edema, +2 distal pulses  Neuro-  No focal deficits, CN intact     Assessment/Plan:    1.  Dm, type 2--check a1c, urine micro. On glipizide, glucophage  2. htn--bit elevated today, on ramipril  3.  hyperlipids--on zocor, check flp. Last , might need to increase dose  4. Routine adult health maintenance--dexa scan ordered. [facility] mamm [facility]    Pneumovax 23 given today. rtc 6 months        Rl Castillo III, DO            This is the Subsequent Medicare Annual Wellness Exam, performed 12 months or more after the Initial AWV or the last Subsequent AWV    I have reviewed the patient's medical history in detail and updated the computerized patient record.      History     Past Medical History:   Diagnosis Date    Diabetes ([address]     Hypercholesterolemia     Hypertension       Past Surgical History:   Procedure Laterality Date    HX CATARACT REMOVAL Bilateral     HX CATARACT REMOVAL  2018    HX CHOLECYSTECTOMY      HX DILATION AND CURETTAGE      HX HYSTERECTOMY      complete Current Outpatient Medications   Medication Sig Dispense Refill    simvastatin (ZOCOR) 20 mg tablet TAKE 1 TABLET BY MOUTH EVERY DAY AT NIGHT 90 Tab 0    glipiZIDE (GLUCOTROL) 5 mg tablet TAKE 1 TABLET BY MOUTH EVERY DAY 90 Tab 0    metFORMIN (GLUCOPHAGE) 500 mg tablet TAKE 1 TABLET BY MOUTH TWICE A DAY WITH FOOD 180 Tab 0    ramipril (ALTACE) 10 mg capsule TAKE 1 CAPSULE BY MOUTH EVERY DAY 90 Cap 1    ALPRAZolam (XANAX) 0.5 mg tablet Take 1 Tab by mouth nightly as needed for Anxiety. 30 Tab 2    Blood-Glucose Meter misc Pt tests daily. Dx: E11.65 1 Each 0    glucose blood VI test strips (ACCU-CHEK MARY GRACE) strip Diagnosis E11.65. Pt is testing once daily. 100 Strip 1    glucose blood VI test strips (BLOOD GLUCOSE TEST) strip Pt tests daily. 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 Functional Ability and Level of Safety:   Hearing Loss  Hearing is good. Activities of Daily Living  The home contains: no safety equipment. Patient does total self care    Fall Risk  Fall Risk Assessment, last 12 mths 3/18/2019   Able to walk? Yes   Fall in past 12 months? No       Abuse Screen  Patient is not abused. Lives with  of 30 years. (had also been boyfriend/girlfriend in high school)    Cognitive Screening   Evaluation of Cognitive Function:  Has your family/caregiver stated any concerns about your memory: no  Normal    Patient Care Team   Patient Care Team:  Ivan Luke DO as PCP - General (Internal Medicine)    Assessment/Plan   Education and counseling provided:  Are appropriate based on today's review and evaluation  End-of-Life planning (with patient's consent)  Pneumococcal Vaccine  Influenza Vaccine  Colorectal cancer screening tests  Bone mass measurement (DEXA)    Diagnoses and all orders for this visit:    1. Medicare annual wellness visit, subsequent    2.  Type 2 diabetes mellitus with hyperglycemia, without long-term current use of insulin [facility])        Health Maintenance Due   Topic Date Due    Bone Densitometry (Dexa) Screening  06/13/2010    HEMOGLOBIN A1C Q6M  07/15/2018    BREAST CANCER SCRN MAMMOGRAM  11/30/2018    FOOT EXAM Q1  01/15/2019    MICROALBUMIN Q1  01/15/2019    LIPID PANEL Q1  01/15/2019    MEDICARE YEARLY EXAM  01/16/2019    COLONOSCOPY  06/25/2019

## 2019-03-18 NOTE — PROGRESS NOTES
Reviewed record in preparation for visit and have obtained necessary documentation. Identified pt with two pt identifiers(name and ). Chief Complaint   Patient presents with   [facility] Maintenance Due   Topic Date Due    Shingrix Vaccine Age 50> (1 of 2) 1995    Bone Densitometry (Dexa) Screening  2010    HEMOGLOBIN A1C Q6M  07/15/2018    BREAST CANCER SCRN MAMMOGRAM  2018    FOOT EXAM Q1  01/15/2019    MICROALBUMIN Q1  01/15/2019    LIPID PANEL Q1  01/15/2019    MEDICARE YEARLY EXAM  2019    COLONOSCOPY  2019       Ms. Brenda Mejia has a reminder for a \"due or due soon\" health maintenance. I have asked that she discuss health maintenance topic(s) due with Her  primary care provider. Coordination of Care Questionnaire:  :     1) Have you been to an emergency room, urgent care clinic since your last visit? no   Hospitalized since your last visit? no             2) Have you seen or consulted any other health care providers outside of [address] [place] [address] since your last visit? no  (Include any pap smears or colon screenings in this section.)    3) Do you have an Advance Directive on file? no    4) Are you interested in receiving information on Advance Directives? NO    Patient is accompanied by self I have received verbal consent from Joanne Snow to discuss any/all medical information while they are present in the room.

## 2019-03-21 ENCOUNTER — HOSPITAL ENCOUNTER (OUTPATIENT)
Dept: BONE DENSITY | Age: 74
Discharge: HOME | End: 2019-03-21
Attending: INTERNAL MEDICINE
Payer: MEDICARE

## 2019-03-21 ENCOUNTER — TELEPHONE (OUTPATIENT)
Dept: INTERNAL MEDICINE CLINIC | Age: 74
End: 2019-03-21

## 2019-03-21 DIAGNOSIS — M81.0 POSTMENOPAUSAL BONE LOSS: ICD-10-CM

## 2019-03-21 PROCEDURE — 77080 DXA BONE DENSITY AXIAL: CPT

## 2019-03-21 NOTE — TELEPHONE ENCOUNTER
Spoke with patient after 2 patient identifiers being note and advised per Dr. Heber Huang She had no complaints when I saw her 3 days ago. Vale Gitelman likely viral. Nitin Margoth antihistamine (claritin, zyrtec, allegra or xyzal) as well as nasocort of flonase.  If not better by early next week, call for appt then. Patient expressed understanding and has no further questions at this time.

## 2019-03-21 NOTE — TELEPHONE ENCOUNTER
#[phone] pt states she woke up the morning with a sinus infection and is asking for medication to be called into [facility] on file for her. Please call with any questions or problems.

## 2019-03-22 NOTE — PROGRESS NOTES
DEXA shows osteopenia, so bones are bit weaker than desired. Work on [employer] baring exercises, staying active. Can discuss med options at next visit.

## 2019-03-22 NOTE — PROGRESS NOTES
Dexa scan results reviewed with patient. Patient verbalized understanding and does not have any questions at this time.

## 2019-05-09 DIAGNOSIS — I10 ESSENTIAL HYPERTENSION WITH GOAL BLOOD PRESSURE LESS THAN 140/90: ICD-10-CM

## 2019-05-09 RX ORDER — RAMIPRIL 10 MG/1
CAPSULE ORAL
Qty: 90 CAP | Refills: 1 | Status: CANCELLED | OUTPATIENT
Start: 2019-05-09

## 2019-05-09 NOTE — TELEPHONE ENCOUNTER
Requested Prescriptions     Pending Prescriptions Disp Refills    ramipril (ALTACE) 10 mg capsule 90 Cap 1       Requested by pharmacy.

## 2019-05-16 DIAGNOSIS — F41.9 INSOMNIA SECONDARY TO ANXIETY: ICD-10-CM

## 2019-05-16 DIAGNOSIS — F51.05 INSOMNIA SECONDARY TO ANXIETY: ICD-10-CM

## 2019-05-16 RX ORDER — ALPRAZOLAM 0.5 MG/1
0.5 TABLET ORAL
Qty: 30 TAB | Refills: 2 | Status: CANCELLED | OUTPATIENT
Start: 2019-05-16

## 2019-05-17 DIAGNOSIS — F51.05 INSOMNIA SECONDARY TO ANXIETY: ICD-10-CM

## 2019-05-17 DIAGNOSIS — F41.9 INSOMNIA SECONDARY TO ANXIETY: ICD-10-CM

## 2019-05-17 RX ORDER — ALPRAZOLAM 0.5 MG/1
0.5 TABLET ORAL
Qty: 30 TAB | Refills: 0 | Status: SHIPPED | OUTPATIENT
Start: 2019-05-17 | End: 2019-07-22 | Stop reason: SDUPTHER

## 2019-05-17 NOTE — TELEPHONE ENCOUNTER
Pt requesting refill of alprazolam 0.5 mg at [facility] in [place], [address] [place] contact [phone].        Message received & copied from [facility]

## 2019-07-22 DIAGNOSIS — F51.05 INSOMNIA SECONDARY TO ANXIETY: ICD-10-CM

## 2019-07-22 DIAGNOSIS — F41.9 INSOMNIA SECONDARY TO ANXIETY: ICD-10-CM

## 2019-07-22 RX ORDER — ALPRAZOLAM 0.5 MG/1
0.5 TABLET ORAL
Qty: 30 TAB | Refills: 0 | Status: SHIPPED | OUTPATIENT
Start: 2019-07-22 | End: 2019-09-30 | Stop reason: SDUPTHER

## 2019-07-22 NOTE — TELEPHONE ENCOUNTER
PCP: Gail Hoang DO    Last appt: 3/18/2019  Future Appointments   Date Time Provider Sindi Parekh   9/18/2019 10:15 AM Gail Hoang DO [facility] SCHED       Requested Prescriptions     Pending Prescriptions Disp Refills    ALPRAZolam (XANAX) 0.5 mg tablet 30 Tab 0     Sig: Take 1 Tab by mouth nightly as needed for Anxiety.

## 2019-09-10 DIAGNOSIS — E11.9 TYPE 2 DIABETES MELLITUS WITHOUT COMPLICATION, WITHOUT LONG-TERM CURRENT USE OF INSULIN (HCC): ICD-10-CM

## 2019-09-30 DIAGNOSIS — F51.05 INSOMNIA SECONDARY TO ANXIETY: ICD-10-CM

## 2019-09-30 DIAGNOSIS — F41.9 INSOMNIA SECONDARY TO ANXIETY: ICD-10-CM

## 2019-09-30 RX ORDER — ALPRAZOLAM 0.5 MG/1
TABLET ORAL
Qty: 30 TAB | Refills: 0 | Status: SHIPPED | OUTPATIENT
Start: 2019-09-30 | End: 2020-01-02 | Stop reason: SDUPTHER

## 2019-12-09 ENCOUNTER — HOSPITAL ENCOUNTER (OUTPATIENT)
Dept: LAB | Age: 74
Discharge: HOME | End: 2019-12-09
Payer: MEDICARE

## 2019-12-09 PROCEDURE — 83036 HEMOGLOBIN GLYCOSYLATED A1C: CPT

## 2019-12-09 PROCEDURE — 85025 COMPLETE CBC W/AUTO DIFF WBC: CPT

## 2019-12-09 PROCEDURE — 80053 COMPREHEN METABOLIC PANEL: CPT

## 2019-12-09 PROCEDURE — 80061 LIPID PANEL: CPT

## 2019-12-09 PROCEDURE — 84443 ASSAY THYROID STIM HORMONE: CPT

## 2019-12-09 PROCEDURE — 82043 UR ALBUMIN QUANTITATIVE: CPT

## 2019-12-09 PROCEDURE — 36415 COLL VENOUS BLD VENIPUNCTURE: CPT

## 2019-12-10 ENCOUNTER — ANESTHESIA EVENT (OUTPATIENT)
Dept: ENDOSCOPY | Age: 74
End: 2019-12-10
Payer: MEDICARE

## 2019-12-10 ENCOUNTER — HOSPITAL ENCOUNTER (OUTPATIENT)
Age: 74
Setting detail: OUTPATIENT SURGERY
Discharge: HOME | End: 2019-12-10
Attending: COLON & RECTAL SURGERY | Admitting: COLON & RECTAL SURGERY
Payer: MEDICARE

## 2019-12-10 ENCOUNTER — ANESTHESIA (OUTPATIENT)
Dept: ENDOSCOPY | Age: 74
End: 2019-12-10
Payer: MEDICARE

## 2019-12-10 VITALS
OXYGEN SATURATION: 99 % | WEIGHT: 154.5 LBS | HEART RATE: 82 BPM | RESPIRATION RATE: 11 BRPM | SYSTOLIC BLOOD PRESSURE: 132 MMHG | TEMPERATURE: 98.1 F | BODY MASS INDEX: 24.83 KG/M2 | DIASTOLIC BLOOD PRESSURE: 81 MMHG | HEIGHT: 66 IN

## 2019-12-10 LAB
ALBUMIN SERPL-MCNC: 4.5 G/DL (ref 3.5–4.8)
ALBUMIN/CREAT UR: 33.9 MG/G CREAT (ref 0–30)
ALBUMIN/GLOB SERPL: 2 {RATIO} (ref 1.2–2.2)
ALP SERPL-CCNC: 98 IU/L (ref 39–117)
ALT SERPL-CCNC: 16 IU/L (ref 0–32)
AST SERPL-CCNC: 18 IU/L (ref 0–40)
BASOPHILS # BLD AUTO: 0.1 X10E3/UL (ref 0–0.2)
BASOPHILS NFR BLD AUTO: 1 %
BILIRUB SERPL-MCNC: 0.3 MG/DL (ref 0–1.2)
BUN SERPL-MCNC: 14 MG/DL (ref 8–27)
BUN/CREAT SERPL: 18 (ref 12–28)
CALCIUM SERPL-MCNC: 9.5 MG/DL (ref 8.7–10.3)
CHLORIDE SERPL-SCNC: 102 MMOL/L (ref 96–106)
CHOLEST SERPL-MCNC: 171 MG/DL (ref 100–199)
CO2 SERPL-SCNC: 21 MMOL/L (ref 20–29)
CREAT SERPL-MCNC: 0.78 MG/DL (ref 0.57–1)
CREAT UR-MCNC: 131.7 MG/DL
EOSINOPHIL # BLD AUTO: 0.2 X10E3/UL (ref 0–0.4)
EOSINOPHIL NFR BLD AUTO: 2 %
ERYTHROCYTE [DISTWIDTH] IN BLOOD BY AUTOMATED COUNT: 12 % (ref 12.3–15.4)
EST. AVERAGE GLUCOSE BLD GHB EST-MCNC: 137 MG/DL
GLOBULIN SER CALC-MCNC: 2.3 G/DL (ref 1.5–4.5)
GLUCOSE SERPL-MCNC: 140 MG/DL (ref 65–99)
HBA1C MFR BLD: 6.4 % (ref 4.8–5.6)
HCT VFR BLD AUTO: 37.2 % (ref 34–46.6)
HDLC SERPL-MCNC: 50 MG/DL
HGB BLD-MCNC: 12.3 G/DL (ref 11.1–15.9)
IMM GRANULOCYTES # BLD AUTO: 0 X10E3/UL (ref 0–0.1)
IMM GRANULOCYTES NFR BLD AUTO: 0 %
LDLC SERPL CALC-MCNC: 95 MG/DL (ref 0–99)
LYMPHOCYTES # BLD AUTO: 2.1 X10E3/UL (ref 0.7–3.1)
LYMPHOCYTES NFR BLD AUTO: 30 %
MCH RBC QN AUTO: 28.7 PG (ref 26.6–33)
MCHC RBC AUTO-ENTMCNC: 33.1 G/DL (ref 31.5–35.7)
MCV RBC AUTO: 87 FL (ref 79–97)
MICROALBUMIN UR-MCNC: 44.7 UG/ML
MONOCYTES # BLD AUTO: 0.5 X10E3/UL (ref 0.1–0.9)
MONOCYTES NFR BLD AUTO: 7 %
NEUTROPHILS # BLD AUTO: 4.2 X10E3/UL (ref 1.4–7)
NEUTROPHILS NFR BLD AUTO: 60 %
PLATELET # BLD AUTO: 256 X10E3/UL (ref 150–450)
POTASSIUM SERPL-SCNC: 4.7 MMOL/L (ref 3.5–5.2)
PROT SERPL-MCNC: 6.8 G/DL (ref 6–8.5)
RBC # BLD AUTO: 4.29 X10E6/UL (ref 3.77–5.28)
SODIUM SERPL-SCNC: 140 MMOL/L (ref 134–144)
TRIGL SERPL-MCNC: 130 MG/DL (ref 0–149)
TSH SERPL DL<=0.005 MIU/L-ACNC: 4.22 UIU/ML (ref 0.45–4.5)
VLDLC SERPL CALC-MCNC: 26 MG/DL (ref 5–40)
WBC # BLD AUTO: 7 X10E3/UL (ref 3.4–10.8)

## 2019-12-10 PROCEDURE — 74011250636 HC RX REV CODE- 250/636: Performed by: COLON & RECTAL SURGERY

## 2019-12-10 PROCEDURE — 76060000031 HC ANESTHESIA FIRST 0.5 HR: Performed by: COLON & RECTAL SURGERY

## 2019-12-10 PROCEDURE — 74011000250 HC RX REV CODE- 250: Performed by: NURSE ANESTHETIST, CERTIFIED REGISTERED

## 2019-12-10 PROCEDURE — 74011250636 HC RX REV CODE- 250/636: Performed by: NURSE ANESTHETIST, CERTIFIED REGISTERED

## 2019-12-10 PROCEDURE — 76040000019: Performed by: COLON & RECTAL SURGERY

## 2019-12-10 RX ORDER — SODIUM CHLORIDE 0.9 % (FLUSH) 0.9 %
5-40 SYRINGE (ML) INJECTION AS NEEDED
Status: DISCONTINUED | OUTPATIENT
Start: 2019-12-10 | End: 2019-12-10 | Stop reason: HOSPADM

## 2019-12-10 RX ORDER — FLUMAZENIL 0.1 MG/ML
0.2 INJECTION INTRAVENOUS
Status: DISCONTINUED | OUTPATIENT
Start: 2019-12-10 | End: 2019-12-10 | Stop reason: HOSPADM

## 2019-12-10 RX ORDER — EPINEPHRINE 0.1 MG/ML
1 INJECTION INTRACARDIAC; INTRAVENOUS
Status: DISCONTINUED | OUTPATIENT
Start: 2019-12-10 | End: 2019-12-10 | Stop reason: HOSPADM

## 2019-12-10 RX ORDER — PROPOFOL 10 MG/ML
INJECTION, EMULSION INTRAVENOUS AS NEEDED
Status: DISCONTINUED | OUTPATIENT
Start: 2019-12-10 | End: 2019-12-10 | Stop reason: HOSPADM

## 2019-12-10 RX ORDER — ATROPINE SULFATE 0.1 MG/ML
0.5 INJECTION INTRAVENOUS
Status: DISCONTINUED | OUTPATIENT
Start: 2019-12-10 | End: 2019-12-10 | Stop reason: HOSPADM

## 2019-12-10 RX ORDER — DEXTROMETHORPHAN/PSEUDOEPHED 2.5-7.5/.8
1.2 DROPS ORAL
Status: DISCONTINUED | OUTPATIENT
Start: 2019-12-10 | End: 2019-12-10 | Stop reason: HOSPADM

## 2019-12-10 RX ORDER — SODIUM CHLORIDE 0.9 % (FLUSH) 0.9 %
5-40 SYRINGE (ML) INJECTION EVERY 8 HOURS
Status: DISCONTINUED | OUTPATIENT
Start: 2019-12-10 | End: 2019-12-10 | Stop reason: HOSPADM

## 2019-12-10 RX ORDER — LIDOCAINE HYDROCHLORIDE 20 MG/ML
INJECTION, SOLUTION EPIDURAL; INFILTRATION; INTRACAUDAL; PERINEURAL AS NEEDED
Status: DISCONTINUED | OUTPATIENT
Start: 2019-12-10 | End: 2019-12-10 | Stop reason: HOSPADM

## 2019-12-10 RX ORDER — NALOXONE HYDROCHLORIDE 0.4 MG/ML
0.4 INJECTION, SOLUTION INTRAMUSCULAR; INTRAVENOUS; SUBCUTANEOUS
Status: DISCONTINUED | OUTPATIENT
Start: 2019-12-10 | End: 2019-12-10 | Stop reason: HOSPADM

## 2019-12-10 RX ORDER — SODIUM CHLORIDE 9 MG/ML
50 INJECTION, SOLUTION INTRAVENOUS CONTINUOUS
Status: DISCONTINUED | OUTPATIENT
Start: 2019-12-10 | End: 2019-12-10 | Stop reason: HOSPADM

## 2019-12-10 RX ADMIN — PROPOFOL 40 MG: 10 INJECTION, EMULSION INTRAVENOUS at 14:42

## 2019-12-10 RX ADMIN — SODIUM CHLORIDE 50 ML/HR: 900 INJECTION, SOLUTION INTRAVENOUS at 14:09

## 2019-12-10 RX ADMIN — LIDOCAINE HYDROCHLORIDE 60 MG: 20 INJECTION, SOLUTION EPIDURAL; INFILTRATION; INTRACAUDAL; PERINEURAL at 14:38

## 2019-12-10 RX ADMIN — PROPOFOL 60 MG: 10 INJECTION, EMULSION INTRAVENOUS at 14:38

## 2019-12-10 RX ADMIN — PROPOFOL 30 MG: 10 INJECTION, EMULSION INTRAVENOUS at 14:54

## 2019-12-10 RX ADMIN — PROPOFOL 40 MG: 10 INJECTION, EMULSION INTRAVENOUS at 14:48

## 2019-12-10 RX ADMIN — PROPOFOL 40 MG: 10 INJECTION, EMULSION INTRAVENOUS at 14:46

## 2019-12-10 RX ADMIN — PROPOFOL 40 MG: 10 INJECTION, EMULSION INTRAVENOUS at 14:50

## 2019-12-10 RX ADMIN — PROPOFOL 40 MG: 10 INJECTION, EMULSION INTRAVENOUS at 14:44

## 2019-12-10 RX ADMIN — PROPOFOL 30 MG: 10 INJECTION, EMULSION INTRAVENOUS at 14:40

## 2019-12-10 RX ADMIN — PROPOFOL 30 MG: 10 INJECTION, EMULSION INTRAVENOUS at 14:52

## 2019-12-10 NOTE — OP NOTES
Colonoscopy Procedure Note Indications: Previous adenomatous polyp Anesthesia/Sedation: MAC anesthesia Propofol Pre-Procedure Exam: Airway: clear Heart: normal S1and S2 Lungs: clear bilateral 
Abdomen: soft, nontender, bowel sounds present and normal in all quadrants Mental Status: awake, alert, and oriented to person, place, and time Procedure in Detail: 
Informed consent was obtained for the procedure, including sedation. Risks of perforation, hemorrhage, adverse drug reaction, and aspiration were discussed. The patient was placed in the left lateral decubitus position. Based on the pre-procedure assessment, including review of the patient's medical history, medications, allergies, and review of systems, she had been deemed to be an appropriate candidate for moderate sedation; she was therefore sedated with the medications listed above. The patient was monitored continuously with ECG tracing, pulse oximetry, blood pressure monitoring, and direct observations. A rectal examination was performed. The [id number] was inserted into the rectum and advanced under direct vision to the cecum, which was identified by the ileocecal valve and appendiceal orifice. The quality of the colonic preparation was excellent. A careful inspection was made as the colonoscope was withdrawn, including a retroflexed view of the rectum; findings and interventions are described below. Appropriate photodocumentation was obtained. Findings:  
Rectum:  
  - Protruding lesions:     -Internal Hemorrhoids Sigmoid: - Excavated lesions:     - Diverticulosis Descending Colon:  
Normal 
Transverse Colon:  
Normal 
Ascending Colon:  
Normal 
Cecum:  
Normal 
 
 
 
 
Specimens: No specimens were collected. EBL: None Complications: None; patient tolerated the procedure well. Attending Attestation: I performed the procedure. Recommendations:   - Repeat colonoscopy in 5 years.

## 2019-12-10 NOTE — ANESTHESIA PREPROCEDURE EVALUATION
Anesthetic History No history of anesthetic complications Review of Systems / Medical History Patient summary reviewed, nursing notes reviewed and pertinent labs reviewed Pulmonary Within defined limits Neuro/Psych Psychiatric history Comments: Anxiety Cardiovascular Hypertension Hyperlipidemia Exercise tolerance: >4 METS Comments: 07/13 ECHO= EF 65%, trivial TR & NR (done to evaluate heart murmur) GI/Hepatic/Renal 
  
 
 
 
 
 
Comments: Diverticulosis HX OF POLYPS Endo/Other Diabetes (runs 's at home) Other Findings Physical Exam 
 
Airway Mallampati: I 
TM Distance: 4 - 6 cm Neck ROM: normal range of motion Mouth opening: Normal 
 
 Cardiovascular Rhythm: regular Rate: normal 
 
 
Pertinent negatives: No murmur Dental 
 
Dentition: Caps/crowns Pulmonary Breath sounds clear to auscultation Abdominal 
GI exam deferred Other Findings Anesthetic Plan ASA: 2 Anesthesia type: total IV anesthesia Induction: Intravenous Anesthetic plan and risks discussed with: Patient

## 2019-12-10 NOTE — ANESTHESIA POSTPROCEDURE EVALUATION
Procedure(s): 
COLONOSCOPY. 
 
total IV anesthesia Anesthesia Post Evaluation Patient location during evaluation: PACU Note status: Adequate. Level of consciousness: responsive to verbal stimuli and sleepy but conscious Pain management: satisfactory to patient Airway patency: patent Anesthetic complications: no 
Cardiovascular status: acceptable Respiratory status: acceptable Hydration status: acceptable Comments: +Post-Anesthesia Evaluation and Assessment Patient: Gretchen Nunez MRN: [id number]  SSN: [id number] YOB: 1945  Age: 76 y.o. Sex: female Cardiovascular Function/Vital Signs /81   Pulse 82   Temp 36.7 °C (98.1 °F)   Resp 11   Ht 5' 6\" (1.676 m)   Wt 70.1 kg (154 lb 8 oz)   SpO2 99%   Breastfeeding No   BMI 24.94 kg/m² Patient is status post Procedure(s): 
COLONOSCOPY. Nausea/Vomiting: Controlled. Postoperative hydration reviewed and adequate. Pain: 
Pain Scale 1: Numeric (0 - 10) (12/10/19 1521) Pain Intensity 1: 0 (12/10/19 1521) Managed. Neurological Status: At baseline. Mental Status and Level of Consciousness: Arousable. Pulmonary Status:  
O2 Device: Room air (12/10/19 1525) Adequate oxygenation and airway patent. Complications related to anesthesia: None Post-anesthesia assessment completed. No concerns. Signed By: Michael Benjamin MD  
 12/10/2019 Post anesthesia nausea and vomiting:  controlled Vitals Value Taken Time /81 12/10/2019  3:25 PM  
Temp Pulse 80 12/10/2019  3:25 PM  
Resp 12 12/10/2019  3:25 PM  
SpO2 98 % 12/10/2019  3:25 PM  
Vitals shown include unvalidated device data.

## 2019-12-10 NOTE — DISCHARGE INSTRUCTIONS
Jacky Crowley  [id number]  1945    COLON DISCHARGE INSTRUCTIONS  Discomfort:  Redness at IV site- apply warm compress to area; if redness or soreness persist- contact your physician  There may be a slight amount of blood passed from the rectum  Gaseous discomfort- walking, belching will help relieve any discomfort  You may not operate a vehicle for 12 hours  You may not engage in an occupation involving machinery or appliances for rest of today  You may not drink alcoholic beverages for at least 12 hours  Avoid making any critical decisions for at least 24 hour  DIET:   High fiber diet. - however -  remember your colon is empty and a heavy meal will produce gas. Avoid these foods:  vegetables, fried / greasy foods, carbonated drinks for today    MEDICATIONS:  resume       ACTIVITY:  You may resume your normal daily activities it is recommended that you spend the remainder of the day resting -  avoid any strenuous activity. CALL M.D. ANY SIGN OF:   Increasing pain, nausea, vomiting  Abdominal distension (swelling)  New increased bleeding (oral or rectal)  Fever (chills)  Pain in chest area  Bloody discharge from nose or mouth  Shortness of breath     Follow-up Instructions:   Call Marietta Leahy MD if any questions or problems. Telephone # [phone]  Biopsy results will be available in  7 to10 days  Should have a repeat colonoscopy in 5 years. COLONOSCOPY FINDINGS:  Your colonoscopy showed: hemorrhoids and sigmoid diverticulosis.

## 2019-12-10 NOTE — H&P
Colon and Rectal Surgery History and Physical 
 
Subjective:  
  
Sofia Joseph is a 76 y.o. female who has hx lasha Patient Active Problem List  
 Diagnosis Date Noted  Hyperlipidemia associated with type 2 diabetes mellitus ([facility] 03/18/2019  Insomnia secondary to anxiety 11/08/2016  Diverticulosis 06/25/2014  Diabetes mellitus ([facility] 07/15/2013  Hypertension 07/15/2013 Past Medical History:  
Diagnosis Date  Diabetes ([facility]  Hypercholesterolemia  Hypertension Past Surgical History:  
Procedure Laterality Date  HX CATARACT REMOVAL Bilateral 2015  HX CATARACT REMOVAL  01/09/2018  HX CHOLECYSTECTOMY  HX DILATION AND CURETTAGE    
 HX HYSTERECTOMY complete Social History Tobacco Use  Smoking status: Never Smoker  Smokeless tobacco: Never Used Substance Use Topics  Alcohol use: No  
  Alcohol/week: 0.0 standard drinks Family History Adopted: Yes  
Problem Relation Age of Onset  Cancer Daughter Prior to Admission medications Medication Sig Start Date End Date Taking? Authorizing Provider  
glucose blood VI test strips (BLOOD GLUCOSE TEST) strip Pt tests daily. DX: E11.65, Please provide with one touch ultra test strips 9/10/19  Yes Annette Marley MD  
ramipril (ALTACE) 10 mg capsule TAKE 1 CAPSULE BY MOUTH EVERY DAY 5/9/19  Yes Joe Gomez III, DO  
glipiZIDE (GLUCOTROL) 5 mg tablet TAKE 1 TABLET BY MOUTH EVERY DAY 5/9/19  Yes Joe Gomez III, DO  
simvastatin (ZOCOR) 20 mg tablet TAKE 1 TABLET BY MOUTH EVERY DAY AT NIGHT 5/9/19  Yes Joe Gomez III, DO  
metFORMIN (GLUCOPHAGE) 500 mg tablet TAKE 1 TABLET BY MOUTH TWICE A DAY WITH FOOD 5/9/19  Yes [place], DO Blood-Glucose Meter misc Pt tests daily. Dx: E11.65 3/15/18  Yes Katarzyna Rust MD  
glucose blood VI test strips (ACCU-CHEK MARY GRACE) strip Diagnosis E11.65.   Pt is testing once daily. 3/12/18  Yes Phyllistgary Jung MD  
ALPRAZolam Jennifer Chin) 0.5 mg tablet TAKE 1 TABLET BY MOUTH NIGHTLY AS NEEDED FOR ANXIETY 9/30/19   [facility] III, DO No Known Allergies Review of Systems: A comprehensive review of systems was negative except for that written in the History of Present Illness. Objective:  
 
Visit Vitals /81 Pulse 83 Temp 98.3 °F (36.8 °C) Resp 26 Ht 5' 6\" (1.676 m) Wt 70.1 kg (154 lb 8 oz) LMP  (LMP Unknown) SpO2 99% Breastfeeding No  
BMI 24.94 kg/m² Physical Exam:  
General:  Alert, cooperative, no distress, appears stated age. Head:  Normocephalic, without obvious abnormality, atraumatic. Eyes:  Conjunctivae/corneas clear. PERRL, EOMs intact. Ears:  Normal external ear canals both ears. Nose: Nares normal. Septum midline. No drainage. Throat: Lips, mucosa, and tongue normal. Teeth and gums normal.  
Neck: Supple, symmetrical, trachea midline, no adenopathy Back:   Symmetric, no curvature. ROM normal.  
Lungs:   Clear Chest wall:  No tenderness or deformity. Heart:  Regular rate and rhythm Abdomen:   Soft, non-tender. Bowel sounds normal. No masses,  No organomegaly. Genitalia:  deferred Extremities: Extremities normal, atraumatic, no cyanosis or edema. Skin: Skin color, texture, turgor normal. No rashes or lesions. Neurologic: CNII-XII intact. Normal strength, sensation and reflexes throughout. Imaging:  images and reports reviewed Lab Review:  No results found for this or any previous visit (from the past 24 hour(s)). Labs and radiology: images and reports reviewed Assessment: Hx lasha Plan: 1. I recommend proceeding with colonoscopy. Treatment alternatives were discussed.  
2. Discussed aspects of surgical intervention, methods, risks (including by not limited to infection, bleeding, hematoma, and perforation of the intestines or solid organs), and the risks of general anesthetic. The patient understands the risks; any and all questions were answered to the patient's satisfaction. Signed By: Jani Catalan MD   
 December 10, 2019

## 2019-12-10 NOTE — BRIEF OP NOTE
BRIEF OPERATIVE NOTE Date of Procedure: 12/10/2019 Preoperative Diagnosis: HX OF POLYPS Postoperative Diagnosis: Pan Colonic Diverticulosis Procedure(s): 
COLONOSCOPY Surgeon(s) and Role: Alicia Aiken MD - Primary Surgical Assistant:  
 
Surgical Staff: 
Endoscopy Technician-1: Kirsten Díaz Endoscopy RN-1: Mo Mcrae RN No case tracking events are documented in the log. Anesthesia: MAC Estimated Blood Loss: none Specimens: * No specimens in log * Findings: sigmoid diverticulosis, hemorrhoids Complications: none apparent Implants: * No implants in log *

## 2019-12-10 NOTE — PROGRESS NOTES
Dm improved, a1c down to 6.4. Good job. Cholesterol levels also look good, LDL 95. Urine sample with minimal protein. Other labs look good. Continue with same meds.

## 2019-12-10 NOTE — ROUTINE PROCESS
Aleja Prater 1945 
[id number] Situation: 
Verbal report received from: Ramiro RN Procedure: Procedure(s): 
COLONOSCOPY Background: 
 
Preoperative diagnosis: HX OF POLYPS Postoperative diagnosis: Pan Colonic Diverticulosis :  Dr. Maryjane Medina Assistant(s): Endoscopy Technician-1: Itzel Isaac Endoscopy RN-1: Teresa Escudero RN Specimens: * No specimens in log * H. Pylori  no Assessment: 
Intra-procedure medications Anesthesia gave intra-procedure sedation and medications, see anesthesia flow sheet yes Intravenous fluids: NS@ Sharrell Nab Vital signs stable Abdominal assessment: round and soft Recommendation: 
Discharge patient per MD order. Family or Friend Fritz  Permission to share finding with family or friend yes

## 2019-12-16 ENCOUNTER — OFFICE VISIT (OUTPATIENT)
Dept: INTERNAL MEDICINE CLINIC | Age: 74
End: 2019-12-16

## 2019-12-16 VITALS
RESPIRATION RATE: 16 BRPM | HEART RATE: 90 BPM | DIASTOLIC BLOOD PRESSURE: 78 MMHG | HEIGHT: 66 IN | BODY MASS INDEX: 25.55 KG/M2 | TEMPERATURE: 97.8 F | SYSTOLIC BLOOD PRESSURE: 148 MMHG | WEIGHT: 159 LBS | OXYGEN SATURATION: 100 %

## 2019-12-16 DIAGNOSIS — F51.05 INSOMNIA SECONDARY TO ANXIETY: ICD-10-CM

## 2019-12-16 DIAGNOSIS — F41.9 INSOMNIA SECONDARY TO ANXIETY: ICD-10-CM

## 2019-12-16 DIAGNOSIS — E11.9 TYPE 2 DIABETES MELLITUS WITHOUT COMPLICATION, WITHOUT LONG-TERM CURRENT USE OF INSULIN (HCC): ICD-10-CM

## 2019-12-16 DIAGNOSIS — E11.69 HYPERLIPIDEMIA ASSOCIATED WITH TYPE 2 DIABETES MELLITUS (HCC): Primary | ICD-10-CM

## 2019-12-16 DIAGNOSIS — E78.5 HYPERLIPIDEMIA ASSOCIATED WITH TYPE 2 DIABETES MELLITUS (HCC): Primary | ICD-10-CM

## 2019-12-16 DIAGNOSIS — I10 ESSENTIAL HYPERTENSION WITH GOAL BLOOD PRESSURE LESS THAN 140/90: ICD-10-CM

## 2019-12-16 RX ORDER — RAMIPRIL 10 MG/1
20 CAPSULE ORAL DAILY
Qty: 180 CAP | Refills: 3 | Status: SHIPPED | OUTPATIENT
Start: 2019-12-16 | End: 2020-10-01

## 2019-12-16 NOTE — PROGRESS NOTES
Identified pt with two pt identifiers(name and ). Reviewed record in preparation for visit and have obtained necessary documentation. Chief Complaint Patient presents with  Follow-up  Diabetes Visit Vitals /78 (BP 1 Location: Left arm, BP Patient Position: Sitting) Pulse 90 Temp 97.8 °F (36.6 °C) (Oral) Resp 16 Ht 5' 6\" (1.676 m) Wt 159 lb (72.1 kg) LMP  (LMP Unknown) SpO2 100% BMI 25.66 kg/m² Pain Scale: 0 - No pain/10 Pain Location:  
 
Health Maintenance Due Topic  FOOT EXAM Q1   
 GLAUCOMA SCREENING Q2Y   
 EYE EXAM RETINAL OR DILATED  BREAST CANCER SCRN MAMMOGRAM   
 
 
Coordination of Care Questionnaire: 
:  
1) Have you been to an emergency room, urgent care, or hospitalized since your last visit? If yes, where when, and reason for visit? no  
 
 
2. Have seen or consulted any other health care provider since your last visit? If yes, where when, and reason for visit? NO 
 
 
3) Do you have an Advanced Directive/ Living Will in place? YES If yes, do we have a copy on file YES If no, would you like information NO Patient is accompanied by self I have received verbal consent from Emerald Taylor to discuss any/all medical information while they are present in the room.

## 2019-12-16 NOTE — PATIENT INSTRUCTIONS
Insomnia: Care Instructions Your Care Instructions Insomnia is the inability to sleep well. It is a common problem for most people at some time. Insomnia may make it hard for you to get to sleep, stay asleep, or sleep as long as you need to. This can make you tired and grouchy during the day. It can also make you forgetful, less effective at work, and unhappy. Insomnia can be caused by conditions such as depression or anxiety. Pain can also affect your ability to sleep. When these problems are solved, the insomnia usually clears up. But sometimes bad sleep habits can cause insomnia. If insomnia is affecting your work or your enjoyment of life, you can take steps to improve your sleep. Follow-up care is a key part of your treatment and safety. Be sure to make and go to all appointments, and call your doctor if you are having problems. It's also a good idea to know your test results and keep a list of the medicines you take. How can you care for yourself at home? What to avoid · Do not have drinks with caffeine, such as coffee or black tea, for 8 hours before bed. · Do not smoke or use other types of tobacco near bedtime. Nicotine is a stimulant and can keep you awake. · Avoid drinking alcohol late in the evening, because it can cause you to wake in the middle of the night. · Do not eat a big meal close to bedtime. If you are hungry, eat a light snack. · Do not drink a lot of water close to bedtime, because the need to urinate may wake you up during the night. · Do not read or watch TV in bed. Use the bed only for sleeping and sexual activity. What to try · Go to bed at the same time every night, and wake up at the same time every morning. Do not take naps during the day. · Keep your bedroom quiet, dark, and cool. · Sleep on a comfortable pillow and mattress. · If watching the clock makes you anxious, turn it facing away from you so you cannot see the time. · If you worry when you lie down, start a worry book. Well before bedtime, write down your worries, and then set the book and your concerns aside. · Try meditation or other relaxation techniques before you go to bed. · If you cannot fall asleep, get up and go to another room until you feel sleepy. Do something relaxing. Repeat your bedtime routine before you go to bed again. · Make your house quiet and calm about an hour before bedtime. Turn down the lights, turn off the TV, log off the computer, and turn down the volume on music. This can help you relax after a busy day. When should you call for help? Watch closely for changes in your health, and be sure to contact your doctor if: 
  · Your efforts to improve your sleep do not work.  
  · Your insomnia gets worse.  
  · You have been feeling down, depressed, or hopeless or have lost interest in things that you usually enjoy. Where can you learn more? Go to http://enrique-jaspreet.info/. Enter P513 in the search box to learn more about \"Insomnia: Care Instructions. \" Current as of: April 7, 2019 Content Version: 12.2 © 8394-5959 Amanda Huff [employer]. Care instructions adapted under license by [employer] (which disclaims liability or warranty for this information). If you have questions about a medical condition or this instruction, always ask your healthcare professional. [address] any warranty or liability for your use of this information. Learning About Sleeping Well What does sleeping well mean? Sleeping well means getting enough sleep. How much sleep is enough varies among people. The number of hours you sleep is not as important as how you feel when you wake up. If you do not feel refreshed, you probably need more sleep. Another sign of not getting enough sleep is feeling tired during the day. The average total nightly sleep time is 7½ to 8 hours.  Healthy adults may need a little more or a little less than this. Why is getting enough sleep important? Getting enough quality sleep is a basic part of good health. When your sleep suffers, your mood and your thoughts can suffer too. You may find yourself feeling more grumpy or stressed. Not getting enough sleep also can lead to serious problems, including injury, accidents, anxiety, and depression. What might cause poor sleeping? Many things can cause sleep problems, including: · Stress. Stress can be caused by fear about a single event, such as giving a speech. Or you may have ongoing stress, such as worry about work or school. · Depression, anxiety, and other mental or emotional conditions. · Changes in your sleep habits or surroundings. This includes changes that happen where you sleep, such as noise, light, or sleeping in a different bed. It also includes changes in your sleep pattern, such as having jet lag or working a late shift. · Health problems, such as pain, breathing problems, and restless legs syndrome. · Lack of regular exercise. How can you help yourself? Here are some tips that may help you sleep more soundly and wake up feeling more refreshed. Your sleeping area · Use your bedroom only for sleeping and sex. A bit of light reading may help you fall asleep. But if it doesn't, do your reading elsewhere in the house. Don't watch TV in bed. · Be sure your bed is big enough to stretch out comfortably, especially if you have a sleep partner. · Keep your bedroom quiet, dark, and cool. Use curtains, blinds, or a sleep mask to block out light. To block out noise, use earplugs, soothing music, or a \"white noise\" machine. Your evening and bedtime routine · Create a relaxing bedtime routine. You might want to take a warm shower or bath, listen to soothing music, or drink a cup of noncaffeinated tea. · Go to bed at the same time every night. And get up at the same time every morning, even if you feel tired. What to avoid · Limit caffeine (coffee, tea, caffeinated sodas) during the day, and don't have any for at least 4 to 6 hours before bedtime. · Don't drink alcohol before bedtime. Alcohol can cause you to wake up more often during the night. · Don't smoke or use tobacco, especially in the evening. Nicotine can keep you awake. · Don't take naps during the day, especially close to bedtime. · Don't lie in bed awake for too long. If you can't fall asleep, or if you wake up in the middle of the night and can't get back to sleep within 15 minutes or so, get out of bed and go to another room until you feel sleepy. · Don't take medicine right before bed that may keep you awake or make you feel hyper or energized. Your doctor can tell you if your medicine may do this and if you can take it earlier in the day. If you can't sleep · Imagine yourself in a peaceful, pleasant scene. Focus on the details and feelings of being in a place that is relaxing. · Get up and do a quiet or boring activity until you feel sleepy. · Don't drink any liquids after 6 p.m. if you wake up often because you have to go to the bathroom. Where can you learn more? Go to http://enrique-jaspreet.info/. Enter J583 in the search box to learn more about \"Learning About Sleeping Well. \" Current as of: May 28, 2019 Content Version: 12.2 © 3402-5489 [employer], [facility]. Care instructions adapted under license by [employer] (which disclaims liability or warranty for this information). If you have questions about a medical condition or this instruction, always ask your healthcare professional. [address] any warranty or liability for your use of this information. Try melatonin 5 mg about 30-45 minutes before you are ready to fall asleep. Increase dose of altace to 20 mg each morning, so take 2 pills each morning to use up what you have. New rx sent in for increased dose.

## 2019-12-16 NOTE — PROGRESS NOTES
Marcia Sykes is a 76 y.o. female who presents for evaluation of routine follow up. Last seen by me march 18, 2019 in npv/awv. Doing well, has lost 4 lbs since then. Feels well. Follows bp at home, almost always 110-125/. ROS: 
Constitutional: negative for fevers, chills, anorexia and weight loss Eyes:   negative for visual disturbance and irritation ENT:   negative for tinnitus,sore throat,nasal congestion,ear pain,hoarseness Respiratory:  negative for cough, hemoptysis, dyspnea,wheezing CV:   negative for chest pain, palpitations, lower extremity edema GI:   negative for nausea, vomiting, diarrhea, abdominal pain,melena Genitourinary: negative for frequency, dysuria and hematuria Musculoskel: negative for myalgias, arthralgias, back pain, muscle weakness, joint pain Neurological:  negative for headaches, dizziness, focal weakness, numbness Psychiatric:     Negative for depression or anxiety Past Medical History:  
Diagnosis Date  Diabetes ([facility] [address]  Hypercholesterolemia  Hypertension Past Surgical History:  
Procedure Laterality Date  COLONOSCOPY N/A 12/10/2019 COLONOSCOPY performed by Lucia Morales MD at [facility] ENDOSCOPY  
 HX CATARACT REMOVAL Bilateral 2015  HX CATARACT REMOVAL  01/09/2018  HX CHOLECYSTECTOMY  HX DILATION AND CURETTAGE    
 HX HYSTERECTOMY complete Family History Adopted: Yes  
Problem Relation Age of Onset  Cancer Daughter Social History Socioeconomic History  Marital status:  Spouse name: Not on file  Number of children: Not on file  Years of education: Not on file  Highest education level: Not on file Occupational History  Not on file Social Needs  Financial resource strain: Not on file  Food insecurity:  
  Worry: Not on file Inability: Not on file  Transportation needs:  
  Medical: Not on file Non-medical: Not on file Tobacco Use  
  Smoking status: Never Smoker  Smokeless tobacco: Never Used Substance and Sexual Activity  Alcohol use: No  
  Alcohol/week: 0.0 standard drinks  Drug use: No  
 Sexual activity: Yes  
  Partners: Male Birth control/protection: None Lifestyle  Physical activity:  
  Days per week: Not on file Minutes per session: Not on file  Stress: Not on file Relationships  Social connections:  
  Talks on phone: Not on file Gets together: Not on file Attends Rastafari service: Not on file Active member of club or organization: Not on file Attends meetings of clubs or organizations: Not on file Relationship status: Not on file  Intimate partner violence:  
  Fear of current or ex partner: Not on file Emotionally abused: Not on file Physically abused: Not on file Forced sexual activity: Not on file Other Topics Concern  Not on file Social History Narrative 2 children  from cancer. Visit Vitals /78 (BP 1 Location: Left arm, BP Patient Position: Sitting) Pulse 90 Temp 97.8 °F (36.6 °C) (Oral) Resp 16 Ht 5' 6\" (1.676 m) Wt 159 lb (72.1 kg) LMP  (LMP Unknown) SpO2 100% BMI 25.66 kg/m² Physical Examination:  
General - Well appearing female HEENT - PERRL, TM no erythema/opacification, normal nasal turbinates, no oropharyngeal erythema or exudate, MMM Neck - supple, no bruits, no thyroidomegaly, no lymphadenopathy Pulm - clear to auscultation bilaterally Cardio - RRR, normal S1 S2, no murmur Abd - soft, nontender, no masses, no HSM Extrem - no edema, +2 distal pulses Neuro-  No focal deficits, CN intact Assessment/Plan: 1.  htn--increase lisinopril to 20. Follow at home. 2.  Dm, type 2--on glipizide, glucophage. a1c 6.4 3.  hyperlipids--on zocor, last LDL 50 
4. Insomnia--suggested to try melatonin, save xanax for when severely needed. Has eye exam every 6 months with dr Jumana Galicia. rtc 4 months for awv.  
 
 
 
Alessio Danielle III, DO

## 2020-01-01 ENCOUNTER — OFFICE VISIT (OUTPATIENT)
Dept: INTERNAL MEDICINE CLINIC | Age: 75
End: 2020-01-01
Payer: MEDICARE

## 2020-01-01 ENCOUNTER — OFFICE VISIT (OUTPATIENT)
Dept: NEUROLOGY | Age: 75
End: 2020-01-01
Payer: MEDICARE

## 2020-01-01 ENCOUNTER — TELEPHONE (OUTPATIENT)
Dept: INTERNAL MEDICINE CLINIC | Age: 75
End: 2020-01-01

## 2020-01-01 ENCOUNTER — HOSPITAL ENCOUNTER (OUTPATIENT)
Dept: LAB | Age: 75
Discharge: HOME | End: 2020-12-29
Payer: MEDICARE

## 2020-01-01 VITALS
HEIGHT: 66 IN | WEIGHT: 162.2 LBS | RESPIRATION RATE: 14 BRPM | DIASTOLIC BLOOD PRESSURE: 85 MMHG | TEMPERATURE: 97.3 F | OXYGEN SATURATION: 97 % | SYSTOLIC BLOOD PRESSURE: 152 MMHG | HEART RATE: 108 BPM | BODY MASS INDEX: 26.07 KG/M2

## 2020-01-01 VITALS
SYSTOLIC BLOOD PRESSURE: 128 MMHG | OXYGEN SATURATION: 97 % | WEIGHT: 158 LBS | HEIGHT: 66 IN | BODY MASS INDEX: 25.39 KG/M2 | DIASTOLIC BLOOD PRESSURE: 82 MMHG | HEART RATE: 88 BPM

## 2020-01-01 DIAGNOSIS — F43.10 PTSD (POST-TRAUMATIC STRESS DISORDER): Primary | ICD-10-CM

## 2020-01-01 DIAGNOSIS — G47.9 SLEEP DIFFICULTIES: ICD-10-CM

## 2020-01-01 DIAGNOSIS — E11.69 HYPERLIPIDEMIA ASSOCIATED WITH TYPE 2 DIABETES MELLITUS (HCC): ICD-10-CM

## 2020-01-01 DIAGNOSIS — I10 WHITE COAT SYNDROME WITH HYPERTENSION: ICD-10-CM

## 2020-01-01 DIAGNOSIS — F43.10 PTSD (POST-TRAUMATIC STRESS DISORDER): ICD-10-CM

## 2020-01-01 DIAGNOSIS — F51.05 INSOMNIA SECONDARY TO ANXIETY: Primary | ICD-10-CM

## 2020-01-01 DIAGNOSIS — F07.81 POST CONCUSSION SYNDROME: ICD-10-CM

## 2020-01-01 DIAGNOSIS — F41.9 INSOMNIA SECONDARY TO ANXIETY: Primary | ICD-10-CM

## 2020-01-01 DIAGNOSIS — E78.5 HYPERLIPIDEMIA ASSOCIATED WITH TYPE 2 DIABETES MELLITUS (HCC): ICD-10-CM

## 2020-01-01 DIAGNOSIS — C64.1 RENAL CELL CARCINOMA OF RIGHT KIDNEY (HCC): ICD-10-CM

## 2020-01-01 DIAGNOSIS — E11.9 TYPE 2 DIABETES MELLITUS WITHOUT COMPLICATION, WITHOUT LONG-TERM CURRENT USE OF INSULIN (HCC): ICD-10-CM

## 2020-01-01 DIAGNOSIS — S06.0X0A CONCUSSION WITHOUT LOSS OF CONSCIOUSNESS, INITIAL ENCOUNTER: ICD-10-CM

## 2020-01-01 DIAGNOSIS — I10 ESSENTIAL HYPERTENSION: ICD-10-CM

## 2020-01-01 LAB
ALBUMIN/CREAT UR: 34 MG/G CREAT (ref 0–29)
BASOPHILS # BLD AUTO: 0 X10E3/UL (ref 0–0.2)
BASOPHILS NFR BLD AUTO: 0 %
BUN SERPL-MCNC: 16 MG/DL (ref 8–27)
BUN/CREAT SERPL: 14 (ref 12–28)
CALCIUM SERPL-MCNC: 9.5 MG/DL (ref 8.7–10.3)
CHLORIDE SERPL-SCNC: 103 MMOL/L (ref 96–106)
CO2 SERPL-SCNC: 18 MMOL/L (ref 20–29)
CREAT SERPL-MCNC: 1.14 MG/DL (ref 0.57–1)
CREAT UR-MCNC: 134.1 MG/DL
EOSINOPHIL # BLD AUTO: 0.3 X10E3/UL (ref 0–0.4)
EOSINOPHIL NFR BLD AUTO: 5 %
ERYTHROCYTE [DISTWIDTH] IN BLOOD BY AUTOMATED COUNT: 12.4 % (ref 11.7–15.4)
GLUCOSE SERPL-MCNC: 369 MG/DL (ref 65–99)
HCT VFR BLD AUTO: 30.2 % (ref 34–46.6)
HGB BLD-MCNC: 9.5 G/DL (ref 11.1–15.9)
IMM GRANULOCYTES # BLD AUTO: 0 X10E3/UL (ref 0–0.1)
IMM GRANULOCYTES NFR BLD AUTO: 0 %
LYMPHOCYTES # BLD AUTO: 1.6 X10E3/UL (ref 0.7–3.1)
LYMPHOCYTES NFR BLD AUTO: 25 %
MAGNESIUM SERPL-MCNC: 1.8 MG/DL (ref 1.6–2.3)
MCH RBC QN AUTO: 26.1 PG (ref 26.6–33)
MCHC RBC AUTO-ENTMCNC: 31.5 G/DL (ref 31.5–35.7)
MCV RBC AUTO: 83 FL (ref 79–97)
MICROALBUMIN UR-MCNC: 45.9 UG/ML
MONOCYTES # BLD AUTO: 0.5 X10E3/UL (ref 0.1–0.9)
MONOCYTES NFR BLD AUTO: 7 %
NEUTROPHILS # BLD AUTO: 4.1 X10E3/UL (ref 1.4–7)
NEUTROPHILS NFR BLD AUTO: 63 %
PLATELET # BLD AUTO: 298 X10E3/UL (ref 150–450)
POTASSIUM SERPL-SCNC: 5.2 MMOL/L (ref 3.5–5.2)
RBC # BLD AUTO: 3.64 X10E6/UL (ref 3.77–5.28)
SODIUM SERPL-SCNC: 138 MMOL/L (ref 134–144)
TSH SERPL DL<=0.005 MIU/L-ACNC: 2.65 UIU/ML (ref 0.45–4.5)
WBC # BLD AUTO: 6.5 X10E3/UL (ref 3.4–10.8)

## 2020-01-01 PROCEDURE — 1090F PRES/ABSN URINE INCON ASSESS: CPT | Performed by: INTERNAL MEDICINE

## 2020-01-01 PROCEDURE — 3051F HG A1C>EQUAL 7.0%<8.0%: CPT | Performed by: INTERNAL MEDICINE

## 2020-01-01 PROCEDURE — G8753 SYS BP > OR = 140: HCPCS | Performed by: INTERNAL MEDICINE

## 2020-01-01 PROCEDURE — G8752 SYS BP LESS 140: HCPCS | Performed by: NURSE PRACTITIONER

## 2020-01-01 PROCEDURE — 82043 UR ALBUMIN QUANTITATIVE: CPT

## 2020-01-01 PROCEDURE — G8536 NO DOC ELDER MAL SCRN: HCPCS | Performed by: INTERNAL MEDICINE

## 2020-01-01 PROCEDURE — 2022F DILAT RTA XM EVC RTNOPTHY: CPT | Performed by: INTERNAL MEDICINE

## 2020-01-01 PROCEDURE — 85025 COMPLETE CBC W/AUTO DIFF WBC: CPT

## 2020-01-01 PROCEDURE — 36415 COLL VENOUS BLD VENIPUNCTURE: CPT

## 2020-01-01 PROCEDURE — 1101F PT FALLS ASSESS-DOCD LE1/YR: CPT | Performed by: NURSE PRACTITIONER

## 2020-01-01 PROCEDURE — 3017F COLORECTAL CA SCREEN DOC REV: CPT | Performed by: NURSE PRACTITIONER

## 2020-01-01 PROCEDURE — 1101F PT FALLS ASSESS-DOCD LE1/YR: CPT | Performed by: INTERNAL MEDICINE

## 2020-01-01 PROCEDURE — G8432 DEP SCR NOT DOC, RNG: HCPCS | Performed by: NURSE PRACTITIONER

## 2020-01-01 PROCEDURE — G8754 DIAS BP LESS 90: HCPCS | Performed by: NURSE PRACTITIONER

## 2020-01-01 PROCEDURE — G8399 PT W/DXA RESULTS DOCUMENT: HCPCS | Performed by: INTERNAL MEDICINE

## 2020-01-01 PROCEDURE — G8536 NO DOC ELDER MAL SCRN: HCPCS | Performed by: NURSE PRACTITIONER

## 2020-01-01 PROCEDURE — 99214 OFFICE O/P EST MOD 30 MIN: CPT | Performed by: INTERNAL MEDICINE

## 2020-01-01 PROCEDURE — G0463 HOSPITAL OUTPT CLINIC VISIT: HCPCS | Performed by: INTERNAL MEDICINE

## 2020-01-01 PROCEDURE — G8754 DIAS BP LESS 90: HCPCS | Performed by: INTERNAL MEDICINE

## 2020-01-01 PROCEDURE — G8432 DEP SCR NOT DOC, RNG: HCPCS | Performed by: INTERNAL MEDICINE

## 2020-01-01 PROCEDURE — 1090F PRES/ABSN URINE INCON ASSESS: CPT | Performed by: NURSE PRACTITIONER

## 2020-01-01 PROCEDURE — 83735 ASSAY OF MAGNESIUM: CPT

## 2020-01-01 PROCEDURE — 84443 ASSAY THYROID STIM HORMONE: CPT

## 2020-01-01 PROCEDURE — G8427 DOCREV CUR MEDS BY ELIG CLIN: HCPCS | Performed by: INTERNAL MEDICINE

## 2020-01-01 PROCEDURE — G8427 DOCREV CUR MEDS BY ELIG CLIN: HCPCS | Performed by: NURSE PRACTITIONER

## 2020-01-01 PROCEDURE — G8399 PT W/DXA RESULTS DOCUMENT: HCPCS | Performed by: NURSE PRACTITIONER

## 2020-01-01 PROCEDURE — G8419 CALC BMI OUT NRM PARAM NOF/U: HCPCS | Performed by: INTERNAL MEDICINE

## 2020-01-01 PROCEDURE — G8419 CALC BMI OUT NRM PARAM NOF/U: HCPCS | Performed by: NURSE PRACTITIONER

## 2020-01-01 PROCEDURE — 3017F COLORECTAL CA SCREEN DOC REV: CPT | Performed by: INTERNAL MEDICINE

## 2020-01-01 PROCEDURE — 99215 OFFICE O/P EST HI 40 MIN: CPT | Performed by: NURSE PRACTITIONER

## 2020-01-01 PROCEDURE — 80048 BASIC METABOLIC PNL TOTAL CA: CPT

## 2020-01-01 RX ORDER — AMITRIPTYLINE HYDROCHLORIDE 10 MG/1
10 TABLET, FILM COATED ORAL
Qty: 30 TAB | Refills: 5 | Status: SHIPPED | OUTPATIENT
Start: 2020-01-01

## 2020-01-01 RX ORDER — AMITRIPTYLINE HYDROCHLORIDE 10 MG/1
10 TABLET, FILM COATED ORAL
Qty: 30 TAB | Refills: 0 | Status: SHIPPED | OUTPATIENT
Start: 2020-01-01 | End: 2020-01-01

## 2020-01-02 DIAGNOSIS — F41.9 INSOMNIA SECONDARY TO ANXIETY: ICD-10-CM

## 2020-01-02 DIAGNOSIS — F51.05 INSOMNIA SECONDARY TO ANXIETY: ICD-10-CM

## 2020-01-03 RX ORDER — ALPRAZOLAM 0.5 MG/1
0.5 TABLET ORAL
Qty: 30 TAB | Refills: 0 | Status: SHIPPED | OUTPATIENT
Start: 2020-01-03 | End: 2020-06-03 | Stop reason: SDUPTHER

## 2020-01-03 NOTE — TELEPHONE ENCOUNTER
PCP: Fern Tran DO    Last appt: 12/16/2019  No future appointments. Requested Prescriptions     Pending Prescriptions Disp Refills    ALPRAZolam (XANAX) 0.5 mg tablet 30 Tab 0     Sig: Take 1 Tab by mouth nightly as needed for Anxiety.  Max Daily Amount: 0.5 mg.

## 2020-06-03 DIAGNOSIS — F41.9 INSOMNIA SECONDARY TO ANXIETY: ICD-10-CM

## 2020-06-03 DIAGNOSIS — F51.05 INSOMNIA SECONDARY TO ANXIETY: ICD-10-CM

## 2020-06-04 RX ORDER — ALPRAZOLAM 0.5 MG/1
0.5 TABLET ORAL
Qty: 30 TAB | Refills: 0 | Status: SHIPPED | OUTPATIENT
Start: 2020-06-04 | End: 2020-08-08 | Stop reason: SDUPTHER

## 2020-06-10 ENCOUNTER — APPOINTMENT (OUTPATIENT)
Dept: CT IMAGING | Age: 75
End: 2020-06-10
Attending: PHYSICIAN ASSISTANT
Payer: MEDICARE

## 2020-06-10 ENCOUNTER — APPOINTMENT (OUTPATIENT)
Dept: GENERAL RADIOLOGY | Age: 75
End: 2020-06-10
Attending: SURGERY
Payer: MEDICARE

## 2020-06-10 ENCOUNTER — HOSPITAL ENCOUNTER (EMERGENCY)
Age: 75
Discharge: HOME | End: 2020-06-11
Attending: EMERGENCY MEDICINE
Payer: MEDICARE

## 2020-06-10 VITALS
OXYGEN SATURATION: 100 % | TEMPERATURE: 97.6 F | SYSTOLIC BLOOD PRESSURE: 184 MMHG | RESPIRATION RATE: 16 BRPM | WEIGHT: 160.72 LBS | HEIGHT: 67 IN | DIASTOLIC BLOOD PRESSURE: 93 MMHG | BODY MASS INDEX: 25.22 KG/M2 | HEART RATE: 103 BPM

## 2020-06-10 DIAGNOSIS — S16.1XXA STRAIN OF NECK MUSCLE, INITIAL ENCOUNTER: Primary | ICD-10-CM
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
## 2020-06-10 DIAGNOSIS — T14.8XXA BRUISE: ICD-10-CM

## 2020-06-10 PROCEDURE — 71250 CT THORAX DX C-: CPT

## 2020-06-10 PROCEDURE — 73562 X-RAY EXAM OF KNEE 3: CPT

## 2020-06-10 PROCEDURE — 74011250637 HC RX REV CODE- 250/637: Performed by: SURGERY

## 2020-06-10 PROCEDURE — 93005 ELECTROCARDIOGRAM TRACING: CPT

## 2020-06-10 PROCEDURE — 99284 EMERGENCY DEPT VISIT MOD MDM: CPT

## 2020-06-10 PROCEDURE — 99283 EMERGENCY DEPT VISIT LOW MDM: CPT

## 2020-06-10 PROCEDURE — 72125 CT NECK SPINE W/O DYE: CPT

## 2020-06-10 PROCEDURE — 70450 CT HEAD/BRAIN W/O DYE: CPT

## 2020-06-10 RX ORDER — METHOCARBAMOL 500 MG/1
500 TABLET, FILM COATED ORAL 3 TIMES DAILY
Qty: 9 TAB | Refills: 0 | Status: SHIPPED | OUTPATIENT
Start: 2020-06-10 | End: 2020-06-11

## 2020-06-10 RX ORDER — METHOCARBAMOL 500 MG/1
500 TABLET, FILM COATED ORAL ONCE
Status: COMPLETED | OUTPATIENT
Start: 2020-06-10 | End: 2020-06-10

## 2020-06-10 RX ADMIN — METHOCARBAMOL TABLETS 500 MG: 500 TABLET, COATED ORAL at 23:19

## 2020-06-11 LAB
ATRIAL RATE: 98 BPM
CALCULATED P AXIS, ECG09: 55 DEGREES
CALCULATED R AXIS, ECG10: 4 DEGREES
CALCULATED T AXIS, ECG11: 23 DEGREES
DIAGNOSIS, 93000: NORMAL
P-R INTERVAL, ECG05: 148 MS
Q-T INTERVAL, ECG07: 348 MS
QRS DURATION, ECG06: 62 MS
QTC CALCULATION (BEZET), ECG08: 444 MS
VENTRICULAR RATE, ECG03: 98 BPM

## 2020-06-11 RX ORDER — METHOCARBAMOL 500 MG/1
500 TABLET, FILM COATED ORAL 3 TIMES DAILY
Qty: 9 TAB | Refills: 0 | Status: SHIPPED | OUTPATIENT
Start: 2020-06-11 | End: 2020-06-14

## 2020-06-11 NOTE — DISCHARGE INSTRUCTIONS
You likely have sprain of the muscles of your neck. Please take Tylenol or Ibuprofen as needed for pain. Use Robaxin as needed for severe pain. Please follow up with your primary care doctor. Please return for any worsening pain, numbness, weakness. Your CT scans showed some incidental findings as below. Please follow up with your primary care doctor regarding these findings.  -Enlarged thyroid  -Complex cystic mass in the right kidney   You would require imaging performed by your primary care provider for these findings.

## 2020-06-11 NOTE — ED NOTES
Report given to Mac Swift RN. They were informed of patient chief complaint, current status, orders completed (to include IV access/medications/radiology testing), outstanding orders that still need to be completed, and the treatment plan. Ensured no questions or concerns regarding the patient prior to departure.

## 2020-06-11 NOTE — ED NOTES
Dr. Minna Eller has reviewed discharge instructions with the patient. The patient verbalized understanding. Patient ambulatory out of ED at this time.
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
## 2020-06-12 ENCOUNTER — TELEPHONE (OUTPATIENT)
Dept: INTERNAL MEDICINE CLINIC | Age: 75
End: 2020-06-12
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
## 2020-06-12 NOTE — TELEPHONE ENCOUNTER
Called, spoke to pt. Two identifiers confirmed. Appointment scheduled for 6/25 @ 2 with Dr. Albania Loya.  Pt verbalized understanding of information discussed w/ no further questions at this time.

## 2020-06-16 RX ORDER — SIMVASTATIN 20 MG/1
TABLET, FILM COATED ORAL
Qty: 90 TAB | Refills: 3 | Status: SHIPPED | OUTPATIENT
Start: 2020-06-16 | End: 2021-01-01

## 2020-06-16 RX ORDER — METFORMIN HYDROCHLORIDE 500 MG/1
TABLET ORAL
Qty: 180 TAB | Refills: 3 | Status: SHIPPED | OUTPATIENT
Start: 2020-06-16 | End: 2021-01-01

## 2020-06-18 ENCOUNTER — OFFICE VISIT (OUTPATIENT)
Dept: NEUROLOGY | Age: 75
End: 2020-06-18

## 2020-06-18 VITALS
WEIGHT: 160 LBS | SYSTOLIC BLOOD PRESSURE: 148 MMHG | OXYGEN SATURATION: 98 % | HEIGHT: 67 IN | HEART RATE: 102 BPM | TEMPERATURE: 97.8 F | BODY MASS INDEX: 25.11 KG/M2 | DIASTOLIC BLOOD PRESSURE: 88 MMHG

## 2020-06-18 DIAGNOSIS — F43.0 ACUTE STRESS REACTION: ICD-10-CM

## 2020-06-18 DIAGNOSIS — S06.0X0A CONCUSSION WITHOUT LOSS OF CONSCIOUSNESS, INITIAL ENCOUNTER: Primary | ICD-10-CM

## 2020-06-18 DIAGNOSIS — G44.319 ACUTE POST-TRAUMATIC HEADACHE, NOT INTRACTABLE: ICD-10-CM
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
## 2020-06-25 ENCOUNTER — OFFICE VISIT (OUTPATIENT)
Dept: INTERNAL MEDICINE CLINIC | Age: 75
End: 2020-06-25

## 2020-06-25 VITALS
HEIGHT: 67 IN | DIASTOLIC BLOOD PRESSURE: 70 MMHG | BODY MASS INDEX: 25.27 KG/M2 | TEMPERATURE: 97.1 F | OXYGEN SATURATION: 98 % | SYSTOLIC BLOOD PRESSURE: 131 MMHG | RESPIRATION RATE: 16 BRPM | HEART RATE: 88 BPM | WEIGHT: 161 LBS

## 2020-06-25 DIAGNOSIS — Z00.00 MEDICARE ANNUAL WELLNESS VISIT, SUBSEQUENT: Primary | ICD-10-CM

## 2020-06-25 DIAGNOSIS — I10 ESSENTIAL HYPERTENSION: ICD-10-CM

## 2020-06-25 DIAGNOSIS — F07.81 POST CONCUSSION SYNDROME: ICD-10-CM

## 2020-06-25 DIAGNOSIS — N28.1 CYST OF RIGHT KIDNEY: ICD-10-CM

## 2020-06-25 DIAGNOSIS — E78.5 HYPERLIPIDEMIA ASSOCIATED WITH TYPE 2 DIABETES MELLITUS (HCC): ICD-10-CM

## 2020-06-25 DIAGNOSIS — E11.9 TYPE 2 DIABETES MELLITUS WITHOUT COMPLICATION, WITHOUT LONG-TERM CURRENT USE OF INSULIN (HCC): ICD-10-CM

## 2020-06-25 DIAGNOSIS — G47.00 INSOMNIA, UNSPECIFIED TYPE: ICD-10-CM

## 2020-06-25 DIAGNOSIS — E11.69 HYPERLIPIDEMIA ASSOCIATED WITH TYPE 2 DIABETES MELLITUS (HCC): ICD-10-CM

## 2020-06-25 DIAGNOSIS — E04.1 THYROID CYST: ICD-10-CM
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
## 2020-06-25 NOTE — PATIENT INSTRUCTIONS
Medicare Wellness Visit, Female The best way to live healthy is to have a lifestyle where you eat a well-balanced diet, exercise regularly, limit alcohol use, and quit all forms of tobacco/nicotine, if applicable. Regular preventive services are another way to keep healthy. Preventive services (vaccines, screening tests, monitoring & exams) can help personalize your care plan, which helps you manage your own care. Screening tests can find health problems at the earliest stages, when they are easiest to treat. [facility] follows the current, evidence-based guidelines published by the [facility] ([facility]) when recommending preventive services for our patients. Because we follow these guidelines, sometimes recommendations change over time as research supports it. (For example, mammograms used to be recommended annually. Even though Medicare will still pay for an annual mammogram, the newer guidelines recommend a mammogram every two years for women of average risk). Of course, you and your doctor may decide to screen more often for some diseases, based on your risk and your co-morbidities (chronic disease you are already diagnosed with). Preventive services for you include: - Medicare offers their members a free annual wellness visit, which is time for you and your primary care provider to discuss and plan for your preventive service needs. Take advantage of this benefit every year! 
-All adults over the age of 72 should receive the recommended pneumonia vaccines. Current USPSTF guidelines recommend a series of two vaccines for the best pneumonia protection.  
-All adults should have a flu vaccine yearly and a tetanus vaccine every 10 years.  
-All adults age 48 and older should receive the shingles vaccines (series of two vaccines). -All adults age 38-68 who are overweight should have a diabetes screening test once every three years. -All adults born between [birth year] and 1965 should be screened once for Hepatitis C. 
-Other screening tests and preventive services for persons with diabetes include: an eye exam to screen for diabetic retinopathy, a kidney function test, a foot exam, and stricter control over your cholesterol.  
-Cardiovascular screening for adults with routine risk involves an electrocardiogram (ECG) at intervals determined by your doctor.  
-Colorectal cancer screenings should be done for adults age 54-65 with no increased risk factors for colorectal cancer. There are a number of acceptable methods of screening for this type of cancer. Each test has its own benefits and drawbacks. Discuss with your doctor what is most appropriate for you during your annual wellness visit. The different tests include: colonoscopy (considered the best screening method), a fecal occult blood test, a fecal DNA test, and sigmoidoscopy. 
 
-A bone mass density test is recommended when a woman turns 65 to screen for osteoporosis. This test is only recommended one time, as a screening. Some providers will use this same test as a disease monitoring tool if you already have osteoporosis. -Breast cancer screenings are recommended every other year for women of normal risk, age 54-69. 
-Cervical cancer screenings for women over age 72 are only recommended with certain risk factors. Here is a list of your current Health Maintenance items (your personalized list of preventive services) with a due date: 
Health Maintenance Due Topic Date Due  
 Diabetic Foot Care  01/15/2019  Glaucoma Screening   01/09/2020 [facility]  01/09/2020 [facility] Annual Well Visit  03/18/2020  Hemoglobin A1C    06/09/2020

## 2020-07-02 ENCOUNTER — HOSPITAL ENCOUNTER (OUTPATIENT)
Dept: PHYSICAL THERAPY | Age: 75
Discharge: HOME | End: 2020-07-02
Payer: MEDICARE

## 2020-07-02 PROCEDURE — 97140 MANUAL THERAPY 1/> REGIONS: CPT | Performed by: PHYSICAL THERAPIST

## 2020-07-02 PROCEDURE — 97161 PT EVAL LOW COMPLEX 20 MIN: CPT | Performed by: PHYSICAL THERAPIST

## 2020-07-02 PROCEDURE — 97110 THERAPEUTIC EXERCISES: CPT | Performed by: PHYSICAL THERAPIST

## 2020-07-06 ENCOUNTER — TELEPHONE (OUTPATIENT)
Dept: NEUROLOGY | Age: 75
End: 2020-07-06

## 2020-07-06 NOTE — TELEPHONE ENCOUNTER
Pt said she was wondering why she was referred to 2 Physical Therapists. [facility]  information was given to her when she was here but is already [facility]. [facility] at [facility]. Does she need to go to [facility]?      She has already been seen at [facility] by the concussion therapist.

## 2020-07-06 NOTE — TELEPHONE ENCOUNTER
If she already has the appropriate therapists with current ongoing care than no she does not have to activate anything new. Was under the impression she needed new consults.

## 2020-07-07 ENCOUNTER — HOSPITAL ENCOUNTER (OUTPATIENT)
Dept: PHYSICAL THERAPY | Age: 75
Discharge: HOME | End: 2020-07-07
Payer: MEDICARE

## 2020-07-07 PROCEDURE — 97110 THERAPEUTIC EXERCISES: CPT

## 2020-07-07 PROCEDURE — 97112 NEUROMUSCULAR REEDUCATION: CPT

## 2020-07-07 PROCEDURE — 97140 MANUAL THERAPY 1/> REGIONS: CPT

## 2020-07-07 NOTE — PROGRESS NOTES
PT DAILY TREATMENT NOTE - [facility] 2-15 Patient Name: Rony Parks Date:2020 : 1945 [x]  Patient  Verified Payor: [place] MEDICARE / Plan: Alexandre Gayle / Product Type: Medicare / In time:125  Out time:241 Total Treatment Time (min): 76 Total Timed Codes (min): 66 
1:1 Treatment Time ( [address] only): 66 Visit #:  2 Treatment Area: Concussion [S06.0X9A] SUBJECTIVE Pain Level (0-10 scale): 4/10 Any medication changes, allergies to medications, adverse drug reactions, diagnosis change, or new procedure performed?: [x] No    [] Yes (see summary sheet for update) Subjective functional status/changes:   [] No changes reported Patient reports she felt great after leaving therapy last visit. She was scheduled to go to another clinic yesterday and left extremely flared up, her trouble sleeping, and headaches returned. OBJECTIVE Modality rationale: decrease inflammation, decrease pain and increase tissue extensibility to improve the patients ability to perform ADLs and reduce pain levels Min Type Additional Details  
  
 [] Estim: []Att   []Unatt    []TENS instruct []IFC  []Premod   []NMES []Other:  []w/US   []w/ice   []w/heat Position: Location:  
  
 []  Traction: [] Cervical       []Lumbar 
                     [] Prone          []Supine []Intermittent   []Continuous Lbs: 
[] before manual 
[] after manual 
[]w/heat  
 []  Ultrasound: []Continuous   [] Pulsed  
                    at: []1MHz   []3MHz Location: 
W/cm2:  
 [] Paraffin Location:  
[]w/heat  
10 [x]  Ice     [x]  Heat 
[]  Ice massage Position: supine Location: neck/forehead  
 []  Laser 
[]  Other: Position: Location:  
 
 []  Vasopneumatic Device Pressure:       [] lo [] med [] hi  
Temperature:   
 
[x] Skin assessment post-treatment:  [x]intact []redness- no adverse reaction 
  []redness  adverse reaction: 36 min Therapeutic Exercise:  [x] See flow sheet :  
Rationale: increase ROM and increase strength to improve the patients ability to perform ADLs and reduce pain levels 15 min Neuromuscular Re-education:  [x]  See flow sheet :  
Rationale: improve coordination, improve balance and increase proprioception  to improve the patients ability to perform ADLs and reduce pain levels 15 min Manual Therapy: sub occipital release. STM to the upper trap, paraspinals, levator. Cervical distraction Rationale: decrease pain, increase ROM, increase tissue extensibility and decrease trigger points to improve the patients ability to perform ADLs and reduce pain levels With 
 [] TE 
 [] TA 
 [] neuro 
 [] other: Patient Education: [x] Review HEP [] Progressed/Changed HEP based on:  
[] positioning   [] body mechanics   [] transfers   [] heat/ice application   
[] other:   
 
Other Objective/Functional Measures: none noted Pain Level (0-10 scale) post treatment: 2/10 ASSESSMENT/Changes in Function:   
Patient demonstrated no occular dysfunctions during neuromuscular re-education. Restrictions presented during cervical and thoracic mobility therex. Restrictions in the soft tissue, primarily in the upper trap and suboccipitals. Tolerated all interventions well, will continue to progress as tolerated. Patient will continue to benefit from skilled PT services to modify and progress therapeutic interventions, address functional mobility deficits, address ROM deficits, address strength deficits, analyze and address soft tissue restrictions, analyze and cue movement patterns, analyze and modify body mechanics/ergonomics and assess and modify postural abnormalities to attain remaining goals. [x]  See Plan of Care 
[]  See progress note/recertification 
[]  See Discharge Summary Progress towards goals / Updated goals: 
Patient is progressing towards goals PLAN 
 [x]  Upgrade activities as tolerated     [x]  Continue plan of care [x]  Update interventions per flow sheet      
[]  Discharge due to:_ 
[]  Other:_ Cristela Lugo 7/7/2020

## 2020-07-09 ENCOUNTER — HOSPITAL ENCOUNTER (OUTPATIENT)
Dept: PHYSICAL THERAPY | Age: 75
Discharge: HOME | End: 2020-07-09
Payer: MEDICARE

## 2020-07-09 PROCEDURE — 97140 MANUAL THERAPY 1/> REGIONS: CPT | Performed by: PHYSICAL THERAPIST

## 2020-07-09 PROCEDURE — 97110 THERAPEUTIC EXERCISES: CPT | Performed by: PHYSICAL THERAPIST

## 2020-07-13 ENCOUNTER — HOSPITAL ENCOUNTER (OUTPATIENT)
Dept: ULTRASOUND IMAGING | Age: 75
Discharge: HOME | End: 2020-07-13
Attending: INTERNAL MEDICINE
Payer: MEDICARE

## 2020-07-13 DIAGNOSIS — E04.1 THYROID CYST: ICD-10-CM

## 2020-07-13 DIAGNOSIS — N28.1 CYST OF RIGHT KIDNEY: ICD-10-CM

## 2020-07-13 DIAGNOSIS — N28.89 RIGHT KIDNEY MASS: Primary | ICD-10-CM

## 2020-07-13 PROCEDURE — 76536 US EXAM OF HEAD AND NECK: CPT

## 2020-07-13 PROCEDURE — 76770 US EXAM ABDO BACK WALL COMP: CPT

## 2020-07-13 NOTE — PROGRESS NOTES
Complex cystic mass on right kidney. Ultrasound pictures not good enough. Needs CT scan dedicated to kidneys, ordered.

## 2020-07-14 ENCOUNTER — TELEPHONE (OUTPATIENT)
Dept: INTERNAL MEDICINE CLINIC | Age: 75
End: 2020-07-14

## 2020-07-14 NOTE — TELEPHONE ENCOUNTER
Called, spoke to pt. Two identifiers confirmed. Pt notified of results/recommendations per Dr. Melba Biswas. Pt verbalized understanding of information discussed w/ no further questions at this time.

## 2020-07-14 NOTE — PROGRESS NOTES
Called, spoke to pt. Two identifiers confirmed. Pt notified of results/recommendations per Dr. Marisa Brock. Pt verbalized understanding of information discussed w/ no further questions at this time.

## 2020-07-14 NOTE — TELEPHONE ENCOUNTER
----- Message from Mario Odell sent at 2020  1:06 PM EDT -----  Regarding: DR Tami Morales / Telephone  Patient return call  To:  [facility]  Subject: Dr. Israel Krabbe  Patient's first and last name: Gasper Kelly  : [birth year]  ID numbers:  #[id number]    Caller's first and last name and relationship (if not the patient): n/a  Best contact number(s): [phone]  Whose call is being returned: Unknown  Details to clarify the request: n/a      Lewis James

## 2020-07-14 NOTE — PROGRESS NOTES
Called, spoke to pt. Two identifiers confirmed. Pt notified of results/recommendations per Dr. Juan Manuel Gabriel. Pt verbalized understanding of information discussed w/ no further questions at this time.

## 2020-07-21 ENCOUNTER — HOSPITAL ENCOUNTER (OUTPATIENT)
Dept: PHYSICAL THERAPY | Age: 75
Discharge: HOME | End: 2020-07-21
Payer: MEDICARE

## 2020-07-21 PROCEDURE — 97110 THERAPEUTIC EXERCISES: CPT | Performed by: PHYSICAL THERAPIST

## 2020-07-21 PROCEDURE — 97140 MANUAL THERAPY 1/> REGIONS: CPT | Performed by: PHYSICAL THERAPIST

## 2020-07-22 ENCOUNTER — HOSPITAL ENCOUNTER (OUTPATIENT)
Dept: CT IMAGING | Age: 75
Discharge: HOME | End: 2020-07-22
Attending: INTERNAL MEDICINE
Payer: MEDICARE

## 2020-07-22 DIAGNOSIS — N28.89 RIGHT KIDNEY MASS: ICD-10-CM

## 2020-07-22 LAB — CREAT BLD-MCNC: 0.8 MG/DL (ref 0.6–1.3)

## 2020-07-22 PROCEDURE — 74011636320 HC RX REV CODE- 636/320: Performed by: INTERNAL MEDICINE

## 2020-07-22 PROCEDURE — 82565 ASSAY OF CREATININE: CPT

## 2020-07-22 PROCEDURE — 74170 CT ABD WO CNTRST FLWD CNTRST: CPT

## 2020-07-22 RX ORDER — SODIUM CHLORIDE 0.9 % (FLUSH) 0.9 %
10 SYRINGE (ML) INJECTION
Status: COMPLETED | OUTPATIENT
Start: 2020-07-22 | End: 2020-07-22

## 2020-07-22 RX ADMIN — Medication 10 ML: at 12:48

## 2020-07-22 RX ADMIN — IOPAMIDOL 100 ML: 755 INJECTION, SOLUTION INTRAVENOUS at 12:48

## 2020-07-23 ENCOUNTER — HOSPITAL ENCOUNTER (OUTPATIENT)
Dept: PHYSICAL THERAPY | Age: 75
Discharge: HOME | End: 2020-07-23
Payer: MEDICARE

## 2020-07-23 DIAGNOSIS — N28.89 RIGHT KIDNEY MASS: Primary | ICD-10-CM

## 2020-07-23 PROCEDURE — 97110 THERAPEUTIC EXERCISES: CPT

## 2020-07-23 NOTE — PROGRESS NOTES
Needs to see urology regarding mass on right kidney, as it appears to be renal cell carcinoma. Referral placed to dr Nesha Kumari.

## 2020-07-23 NOTE — PROGRESS NOTES
PT DAILY TREATMENT NOTE - [facility] 2-15    Patient Name: Stacey Baltazar  Date:2020  : 1945  [x]  Patient  Verified  Payor: [place] MEDICARE / Plan: VA MEDICARE PART A & B / Product Type: Medicare /    In time:127  Out time:159  Total Treatment Time (min): 32  Total Timed Codes (min): 32  1:1 Treatment Time ([facility] only): 32   Visit #:  5    Treatment Area: Concussion [S06.0X9A]    SUBJECTIVE  Pain Level (0-10 scale): 0/10  Any medication changes, allergies to medications, adverse drug reactions, diagnosis change, or new procedure performed?: [x] No    [] Yes (see summary sheet for update)  Subjective functional status/changes:   [] No changes reported  Patient feels like she is 100% better. Does not have any limitations with any ADLs. OBJECTIVE    32 min Therapeutic Exercise:  [x] See flow sheet :   Rationale: increase ROM and increase strength to improve the patients ability to perform ADLs and reduce pain levels. With   [] TE   [] TA   [] neuro   [] other: Patient Education: [x] Review HEP    [] Progressed/Changed HEP based on:   [] positioning   [] body mechanics   [] transfers   [] heat/ice application    [] other:      Other Objective/Functional Measures: FOTO:  97    Pain Level (0-10 scale) post treatment: 0/10    ASSESSMENT/Changes in Function:   Patient demonstrates a good understanding of all therex. []  See Plan of Care  [x]  See progress note/recertification  []  See Discharge Summary         Progress towards goals / Updated goals:  Patient is progressing towards goals.      PLAN  [x]  Upgrade activities as tolerated     [x]  Continue plan of care  [x]  Update interventions per flow sheet       []  Discharge due to:_  []  Other:_      [place] 2020

## 2020-07-23 NOTE — PROGRESS NOTES
Called, spoke to pt. Two identifiers confirmed. Pt notified of results/recommendations per Dr. Irene Eason. Contact information provided to pt for Dr. Hal Berry. Pt verbalized understanding of information discussed w/ no further questions at this time. Records faxed to Dr. Gasper Smith office.
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
## 2020-07-27 NOTE — PROGRESS NOTES
[facility] [address] [place] [address] [address] Phone: [phone] Fax: [phone] Discharge Summary 2-15 Patient name: Celia Barahona  : 1945  Provider#: [id number] Referral source: Krisawildanaldo Duran, * Medical/Treatment Diagnosis: Concussion [S06.0X9A] Prior Hospitalization: see medical history Comorbidities: See Plan of Care Prior Level of Function: See Plan of Care Medications: Verified on Patient Summary List 
 
Start of Care: 20      Onset Date:6/10/20 Visits from Start of Care: 5     Missed Visits: 0 Reporting Period : 20 to 20 Assessment/Summary of care: Pt has completed 5 visits of therapy that have focused on restoring ROM and postural strength to improve her ability to complete all ADL's. She has progressed nicely and demonstrates independence with HEP. She will be DC'd today having met her treatment goals and encouraged to continue to work on her HEP at home Goal: 
Short Term Goals: To be accomplished in 8-10 treatments: 
            Pt will be I with HEP MET Pt will complain of pain 2-3/10 with all activity MET Pt will increase ROM to complete all ADL's more efficiently MET Pt will be able to maintain positions for 30 min without increased symptoms MET 
  
Long Term Goals: To be accomplished in 14-16 treatments: 
            Pt will complain of pain 0-1/10 with all activity MEt Pt will increase strength to stabilize the cervical spine with all activity MET Pt will increase FOTO score by 7 points to meet [address] and improve their function MET Pt will improve her tolerance for driving and gain confidence with being in cars to improve her independence to complete all errands and daily activity MET 
 
 
 
RECOMMENDATIONS: 
[]Discontinue therapy: [x]Patient has reached or is progressing toward set goals []Patient is non-compliant or has abdicated []Due to lack of appreciable progress towards set goals []Other Sincere Flores, PT 7/27/2020
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
## 2020-07-27 NOTE — PROGRESS NOTES
PT DAILY TREATMENT NOTE - [facility] 2-15 Patient Name: Berta Leal Date:20 : 1945 [x]  Patient  Verified Payor: [place] / Plan: [address] / Product Type: Medicare / In time:117  Out time:212 Total Treatment Time (min): 55 Total Timed Codes (min): 45 
1:1 Treatment Time ( only): 45 Visit #:  4 Treatment Area: Concussion [S06.0X9A] SUBJECTIVE Pain Level (0-10 scale):0 Any medication changes, allergies to medications, adverse drug reactions, diagnosis change, or new procedure performed?: [x] No    [] Yes (see summary sheet for update) Subjective functional status/changes:   [] No changes reported Pt is without new reports OBJECTIVE Modality rationale: decrease inflammation, decrease pain and increase tissue extensibility to improve the patients ability to perform ADLs and reduce pain levels Min Type Additional Details  
  
 [] Estim: []Att   []Unatt    []TENS instruct []IFC  []Premod   []NMES []Other:  []w/US   []w/ice   []w/heat Position: Location:  
  
 []  Traction: [] Cervical       []Lumbar 
                     [] Prone          []Supine []Intermittent   []Continuous Lbs: 
[] before manual 
[] after manual 
[]w/heat  
 []  Ultrasound: []Continuous   [] Pulsed  
                    at: []1MHz   []3MHz Location: 
W/cm2:  
 [] Paraffin Location:  
[]w/heat  
10 [x]  Ice     [x]  Heat 
[]  Ice massage Position: supine Location: neck/forehead  
 []  Laser 
[]  Other: Position: Location:  
 
 []  Vasopneumatic Device Pressure:       [] lo [] med [] hi  
Temperature:   
 
[x] Skin assessment post-treatment:  [x]intact []redness- no adverse reaction 
  []redness  adverse reaction:  
 
30 min Therapeutic Exercise:  [x] See flow sheet :  
Rationale: increase ROM and increase strength to improve the patients ability to perform ADLs and reduce pain levels 15 min Manual Therapy: sub occipital release. STM to the upper trap, paraspinals, levator. Cervical distraction Rationale: decrease pain, increase ROM, increase tissue extensibility and decrease trigger points to improve the patients ability to perform ADLs and reduce pain levels Other Objective/Functional Measures: none noted Pain Level (0-10 scale) post treatment: 0 
 
ASSESSMENT/Changes in Function:   
Pt has continued to show progress in ROM and postural awareness. She is improving her tolerance for activity and has become independent with her HEP. Patient will continue to benefit from skilled PT services to modify and progress therapeutic interventions, address functional mobility deficits, address ROM deficits, address strength deficits, analyze and address soft tissue restrictions, analyze and cue movement patterns, analyze and modify body mechanics/ergonomics and assess and modify postural abnormalities to attain remaining goals. [x]  See Plan of Care 
[]  See progress note/recertification 
[]  See Discharge Summary Progress towards goals / Updated goals: 
Patient is progressing towards goals PLAN [x]  Upgrade activities as tolerated     [x]  Continue plan of care [x]  Update interventions per flow sheet      
[]  Discharge due to:_ 
[]  Other:_ Julián Sanders, PT 7/21/20
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
## 2020-08-08 DIAGNOSIS — F41.9 INSOMNIA SECONDARY TO ANXIETY: ICD-10-CM

## 2020-08-08 DIAGNOSIS — F51.05 INSOMNIA SECONDARY TO ANXIETY: ICD-10-CM

## 2020-08-10 RX ORDER — ALPRAZOLAM 0.5 MG/1
0.5 TABLET ORAL
Qty: 30 TAB | Refills: 0 | Status: ON HOLD | OUTPATIENT
Start: 2020-08-10 | End: 2021-01-01

## 2020-08-10 NOTE — TELEPHONE ENCOUNTER
Future Appointments:  Future Appointments   Date Time Provider Sindi Izabella   8/12/2020  1:00 PM [facility] OR PRE [facility]        Last Appointment With Me:  6/25/2020     Last Appointment My Department:  6/25/2020    Requested Prescriptions     Pending Prescriptions Disp Refills    ALPRAZolam (XANAX) 0.5 mg tablet 30 Tab 0     Sig: Take 1 Tab by mouth nightly as needed for Anxiety.  Max Daily Amount: 0.5 mg.

## 2020-08-12 ENCOUNTER — HOSPITAL ENCOUNTER (OUTPATIENT)
Dept: PREADMISSION TESTING | Age: 75
Discharge: HOME | End: 2020-08-12
Attending: UROLOGY
Payer: MEDICARE

## 2020-08-12 VITALS
BODY MASS INDEX: 26.29 KG/M2 | TEMPERATURE: 98.5 F | DIASTOLIC BLOOD PRESSURE: 66 MMHG | HEART RATE: 91 BPM | OXYGEN SATURATION: 97 % | HEIGHT: 66 IN | WEIGHT: 163.58 LBS | SYSTOLIC BLOOD PRESSURE: 142 MMHG

## 2020-08-12 LAB
ABO + RH BLD: NORMAL
ALBUMIN SERPL-MCNC: 3.7 G/DL (ref 3.5–5)
ALBUMIN/GLOB SERPL: 1 {RATIO} (ref 1.1–2.2)
ALP SERPL-CCNC: 102 U/L (ref 45–117)
ALT SERPL-CCNC: 23 U/L (ref 12–78)
ANION GAP SERPL CALC-SCNC: 6 MMOL/L (ref 5–15)
APPEARANCE UR: ABNORMAL
APTT PPP: 23.1 SEC (ref 22.1–32)
AST SERPL-CCNC: 16 U/L (ref 15–37)
BACTERIA URNS QL MICRO: ABNORMAL /HPF
BILIRUB SERPL-MCNC: 0.8 MG/DL (ref 0.2–1)
BILIRUB UR QL: NEGATIVE
BLOOD GROUP ANTIBODIES SERPL: NORMAL
BUN SERPL-MCNC: 12 MG/DL (ref 6–20)
BUN/CREAT SERPL: 13 (ref 12–20)
CALCIUM SERPL-MCNC: 9 MG/DL (ref 8.5–10.1)
CHLORIDE SERPL-SCNC: 105 MMOL/L (ref 97–108)
CO2 SERPL-SCNC: 25 MMOL/L (ref 21–32)
COLOR UR: ABNORMAL
CREAT SERPL-MCNC: 0.92 MG/DL (ref 0.55–1.02)
EPITH CASTS URNS QL MICRO: ABNORMAL /LPF
ERYTHROCYTE [DISTWIDTH] IN BLOOD BY AUTOMATED COUNT: 12.9 % (ref 11.5–14.5)
EST. AVERAGE GLUCOSE BLD GHB EST-MCNC: 160 MG/DL
GLOBULIN SER CALC-MCNC: 3.7 G/DL (ref 2–4)
GLUCOSE SERPL-MCNC: 144 MG/DL (ref 65–100)
GLUCOSE UR STRIP.AUTO-MCNC: NEGATIVE MG/DL
HBA1C MFR BLD: 7.2 % (ref 4–5.6)
HCT VFR BLD AUTO: 36.3 % (ref 35–47)
HGB BLD-MCNC: 11.5 G/DL (ref 11.5–16)
HGB UR QL STRIP: ABNORMAL
INR PPP: 1 (ref 0.9–1.1)
KETONES UR QL STRIP.AUTO: ABNORMAL MG/DL
LEUKOCYTE ESTERASE UR QL STRIP.AUTO: ABNORMAL
MAGNESIUM SERPL-MCNC: 1.9 MG/DL (ref 1.6–2.4)
MCH RBC QN AUTO: 27.4 PG (ref 26–34)
MCHC RBC AUTO-ENTMCNC: 31.7 G/DL (ref 30–36.5)
MCV RBC AUTO: 86.6 FL (ref 80–99)
NITRITE UR QL STRIP.AUTO: POSITIVE
NRBC # BLD: 0 K/UL (ref 0–0.01)
NRBC BLD-RTO: 0 PER 100 WBC
PH UR STRIP: 5 [PH] (ref 5–8)
PHOSPHATE SERPL-MCNC: 2.6 MG/DL (ref 2.6–4.7)
PLATELET # BLD AUTO: 242 K/UL (ref 150–400)
PMV BLD AUTO: 10.5 FL (ref 8.9–12.9)
POTASSIUM SERPL-SCNC: 4.4 MMOL/L (ref 3.5–5.1)
PROT SERPL-MCNC: 7.4 G/DL (ref 6.4–8.2)
PROT UR STRIP-MCNC: 30 MG/DL
PROTHROMBIN TIME: 10 SEC (ref 9–11.1)
RBC # BLD AUTO: 4.19 M/UL (ref 3.8–5.2)
RBC #/AREA URNS HPF: ABNORMAL /HPF (ref 0–5)
SODIUM SERPL-SCNC: 136 MMOL/L (ref 136–145)
SP GR UR REFRACTOMETRY: 1.02 (ref 1–1.03)
SPECIMEN EXP DATE BLD: NORMAL
THERAPEUTIC RANGE,PTTT: NORMAL SECS (ref 58–77)
UA: UC IF INDICATED,UAUC: ABNORMAL
UROBILINOGEN UR QL STRIP.AUTO: 0.2 EU/DL (ref 0.2–1)
WBC # BLD AUTO: 6.7 K/UL (ref 3.6–11)
WBC URNS QL MICRO: ABNORMAL /HPF (ref 0–4)

## 2020-08-12 PROCEDURE — 86900 BLOOD TYPING SEROLOGIC ABO: CPT

## 2020-08-12 PROCEDURE — 85027 COMPLETE CBC AUTOMATED: CPT

## 2020-08-12 PROCEDURE — 36415 COLL VENOUS BLD VENIPUNCTURE: CPT

## 2020-08-12 PROCEDURE — 84100 ASSAY OF PHOSPHORUS: CPT

## 2020-08-12 PROCEDURE — 85730 THROMBOPLASTIN TIME PARTIAL: CPT

## 2020-08-12 PROCEDURE — 87186 SC STD MICRODIL/AGAR DIL: CPT

## 2020-08-12 PROCEDURE — 80053 COMPREHEN METABOLIC PANEL: CPT

## 2020-08-12 PROCEDURE — 87077 CULTURE AEROBIC IDENTIFY: CPT

## 2020-08-12 PROCEDURE — 85610 PROTHROMBIN TIME: CPT

## 2020-08-12 PROCEDURE — 83735 ASSAY OF MAGNESIUM: CPT

## 2020-08-12 PROCEDURE — 87086 URINE CULTURE/COLONY COUNT: CPT

## 2020-08-12 PROCEDURE — 81001 URINALYSIS AUTO W/SCOPE: CPT

## 2020-08-12 PROCEDURE — 83036 HEMOGLOBIN GLYCOSYLATED A1C: CPT

## 2020-08-12 RX ORDER — ACETAMINOPHEN 500 MG
1000 TABLET ORAL ONCE
Status: CANCELLED | OUTPATIENT
Start: 2020-08-24 | End: 2020-08-24

## 2020-08-12 RX ORDER — CELECOXIB 200 MG/1
200 CAPSULE ORAL ONCE
Status: CANCELLED | OUTPATIENT
Start: 2020-08-24 | End: 2020-08-24

## 2020-08-12 RX ORDER — SODIUM CHLORIDE, SODIUM LACTATE, POTASSIUM CHLORIDE, CALCIUM CHLORIDE 600; 310; 30; 20 MG/100ML; MG/100ML; MG/100ML; MG/100ML
75 INJECTION, SOLUTION INTRAVENOUS CONTINUOUS
Status: CANCELLED | OUTPATIENT
Start: 2020-08-24 | End: 2020-08-24

## 2020-08-12 RX ORDER — GABAPENTIN 300 MG/1
600 CAPSULE ORAL ONCE
Status: CANCELLED | OUTPATIENT
Start: 2020-08-24 | End: 2020-08-24

## 2020-08-12 NOTE — PERIOP NOTES
Incentive Darwin Reed Using the incentive spirometer helps expand the small air sacs of your lungs, helps you breathe deeply, and helps improve your lung function. Use your incentive spirometer twice a day (10 breaths each time) prior to surgery. How to Use Your Incentive Spirometer: 1. Hold the incentive spirometer in an upright position. 2. Breathe out as usual.  
3. Place the mouthpiece in your mouth and seal your lips tightly around it. 4. Take a deep breath. Breathe in slowly and as deeply as possible. Keep the blue flow rate guide between the arrows. 5. Hold your breath as long as possible. Then exhale slowly and allow the piston to fall to the bottom of the column. 6. Rest for a few seconds and repeat steps one through five at least 10 times. PAT Tidal Volume_____2250_____________  x______2__________  Date______8/12/20_________________ BRING THE INCENTIVE SPIROMETER WITH YOU TO THE HOSPITAL ON THE DAY OF YOUR SURGERY. Opportunity given to ask and answer questions as well as to observe return demonstration. Patient signature_____________________________    Witness____________________________

## 2020-08-12 NOTE — PERIOP NOTES
[facility] Preoperative Instructions Surgery Date *August 24, 2020        Time of Arrival 1100 Contact# [phone] 1. On the day of your surgery, please report to the Surgical Services Registration Desk and sign in at your designated time. The Surgery Center is located to the right of the Emergency Room. 2. You must have someone with you to drive you home. You should not drive a car for 24 hours following surgery. Please make arrangements for a friend or family member to stay with you for the first 24 hours after your surgery. 3. Do not have anything to eat or drink (including water, gum, mints, coffee, juice) after midnight ?8/23/20 Follow ERAS instructions This may not apply to medications prescribed by your physician. ?(Please note below the special instructions with medications to take the morning of your procedure.) 4. We recommend you do not drink any alcoholic beverages for 24 hours before and after your surgery. 5. Contact your surgeons office for instructions on the following medications: non-steroidal anti-inflammatory drugs (i.e. Advil, Aleve), vitamins, and supplements. (Some surgeons will want you to stop these medications prior to surgery and others may allow you to take them) **If you are currently taking Plavix, Coumadin, Aspirin and/or other blood-thinning agents, contact your surgeon for instructions. ** Your surgeon will partner with the physician prescribing these medications to determine if it is safe to stop or if you need to continue taking. Please do not stop taking these medications without instructions from your surgeon 6. Wear comfortable clothes. Wear glasses instead of contacts. Do not bring any money or jewelry. Please bring picture ID, insurance card, and any prearranged co-payment or hospital payment. Do not wear make-up, particularly mascara the morning of your surgery.   Do not wear nail polish, particularly if you are having foot /hand surgery. Wear your hair loose or down, no ponytails, buns, niharika pins or clips. All body piercings must be removed. Please shower with antibacterial soap for three consecutive days before and on the morning of surgery, but do not apply any lotions, powders or deodorants after the shower on the day of surgery. Please use a fresh towels after each shower. Please sleep in clean clothes and change bed linens the night before surgery. Please do not shave for 48 hours prior to surgery. Shaving of the face is acceptable. 7. You should understand that if you do not follow these instructions your surgery may be cancelled. If your physical condition changes (I.e. fever, cold or flu) please contact your surgeon as soon as possible. 8. It is important that you be on time. If a situation occurs where you may be late, please call [phone] (OR Holding Area). 9. If you have any questions and or problems, please call [phone] (Pre-admission Testing). 10. Your surgery time may be subject to change. You will receive a phone call the evening prior if your time changes. 11.  If having outpatient surgery, you must have someone to drive you here, stay with you during the duration of your stay, and to drive you home at time of discharge. Special Instructions: Follow surgeon instructions Follow ERAs instructions Covid tst on August 20 at 1145 Self Quarantine from test day to day of surgery Practice incentive spirometry twice a day - ten times each TAKE ALL MEDICATIONS DAY OF SURGERY EXCEPT: 
Metformin, Ramipril, Glipizide I understand a pre-operative phone call will be made to verify my surgery time. In the event that I am not available, I give permission for a message to be left on my answering service and/or with another person? yes 
 
 
 
 ___________________      __________   _________ (Signature of Patient)             (Witness)                (Date and Time)

## 2020-08-12 NOTE — PERIOP NOTES
Hibiclens/Chlorhexidine Preventing Infections Before and After  Your Surgery IMPORTANT INSTRUCTIONS Please read and follow these instructions carefully. If you are unable to comply with the below instructions your procedure will be cancelled. Every Night for Three (3) nights before your surgery: 1. Shower with an antibacterial soap, such as Dial, or the soap provided at your preassessment appointment. A shower is better than a bath for cleaning your skin. 2. If needed, ask someone to help you reach all areas of your body. Dont forget to clean your belly button with every shower. The night before your surgery: If you lose your Hibiclens/chlorhexidine please contact surgery center or you can purchase it at a local pharmacy 1. On the night before your surgery, shower with an antibacterial soap, such as Dial, or the soap provided at your preassessment appointment. 2. With one packet of Hibiclens/Chlorhexidine in hand, turn water off. 
3. Apply Hibiclens antiseptic skin cleanser with a clean, freshly washed washcloth. ? Gently apply to your body from chin to toes (except the genital area) and especially the area(s) where your incision(s) will be. ? Leave Hibiclens/Chlorhexidine on your skin for at least 20 seconds. CAUTION: If needed, Hibiclens/chlorhexidine may be used to clean the folds of skin of the legs (such as in the area of the groin) and on your buttocks and hips. However, do not use Hibiclens/Chlorhexidine above the neck or in the genital area (your bottom) or put inside any area of your body. 4. Turn the water back on and rinse. 5. Dry gently with a clean, freshly washed towel. 6. After your shower, do not use any powder, deodorant, perfumes or lotion. 7. Use clean, freshly washed towels and washcloths every time you shower. 8. Wear clean, freshly washed pajamas to bed the night before surgery. 9. Sleep on clean, freshly washed sheets. 10. Do not allow pets to sleep in your bed with you. The Morning of your surgery: 1. Shower again thoroughly with an antibacterial soap, such as Dial or the soap provided at your preassessment appointment. If needed, ask someone for help to reach all areas of your body. Dont forget to clean your belly button! Rinse. 2. Dry gently with a clean, freshly washed towel. 3. After your shower, do not use any powder, deodorant, perfumes or lotion prior to surgery. 4. Put on clean, freshly washed clothing. Tips to help prevent infections after your surgery: 1. Protect your surgical wound from germs: 
? Hand washing is the most important thing you and your caregivers can do to prevent infections. ? Keep your bandage clean and dry! ? Do not touch your surgical wound. 2. Use clean, freshly washed towels and washcloths every time you shower; do not share bath linens with others. 3. Until your surgical wound is healed, wear clothing and sleep on bed linens each day that are clean and freshly washed. 4. Do not allow pets to sleep in your bed with you or touch your surgical wound. 5. Do not smoke  smoking delays wound healing. This may be a good time to stop smoking. 6. If you have diabetes, it is important for you to manage your blood sugar levels properly before your surgery as well as after your surgery. Poorly managed blood sugar levels slow down wound healing and prevent you from healing completely. If you lose your Hibiclens/chlorhexidine, please call the [facility], or it is available for purchase at your pharmacy. ___________________      ___________________      ________________ 
(Signature of Patient)          (Witness)                   (Date and Time)

## 2020-08-12 NOTE — PERIOP NOTES
Reviewed ERAS booklet with patient. Opportunity provided to answer questions EKG and CxR not repeated. EKG and CT of chest 6/11/20

## 2020-08-13 LAB
BACTERIA SPEC CULT: NORMAL
BACTERIA SPEC CULT: NORMAL
SERVICE CMNT-IMP: NORMAL

## 2020-08-14 NOTE — PERIOP NOTES
Urine culture continues to be preliminary- will print and fax to Dr Shirley's office- Phone call made to DR Shirley's office to request attention to this

## 2020-08-16 LAB
BACTERIA SPEC CULT: ABNORMAL
BACTERIA SPEC CULT: ABNORMAL
CC UR VC: ABNORMAL
SERVICE CMNT-IMP: ABNORMAL

## 2020-08-17 RX ORDER — CEPHALEXIN 500 MG/1
500 CAPSULE ORAL 3 TIMES DAILY
Status: ON HOLD | COMMUNITY
Start: 2020-08-17 | End: 2020-08-25

## 2020-08-17 NOTE — PERIOP NOTES
Final urine culture results return faxed these to Dr Shirley's office [phone] message from Dr Shirley's office- treatment for UTI was started - keflex 500mg TID x 7 days

## 2020-08-20 ENCOUNTER — HOSPITAL ENCOUNTER (OUTPATIENT)
Dept: PREADMISSION TESTING | Age: 75
Discharge: HOME | End: 2020-08-20
Payer: MEDICARE

## 2020-08-20 PROCEDURE — 87635 SARS-COV-2 COVID-19 AMP PRB: CPT

## 2020-08-21 LAB
HEALTH STATUS, XMCV2T: NORMAL
SARS-COV-2, COV2NT: NOT DETECTED
SOURCE, COVRS: NORMAL
SPECIMEN SOURCE, FCOV2M: NORMAL
SPECIMEN TYPE, XMCV1T: NORMAL

## 2020-08-22 ENCOUNTER — ANESTHESIA EVENT (OUTPATIENT)
Dept: SURGERY | Age: 75
DRG: 658 | End: 2020-08-22
Payer: MEDICARE

## 2020-08-22 NOTE — ANESTHESIA PREPROCEDURE EVALUATION
Anesthetic History No history of anesthetic complications Review of Systems / Medical History Patient summary reviewed, nursing notes reviewed and pertinent labs reviewed Pulmonary Within defined limits Neuro/Psych Psychiatric history Comments: Anxiety Cardiovascular Hypertension Hyperlipidemia Exercise tolerance: >4 METS Comments: TTE (7/17/13): Left ventricle: Systolic function was normal. Ejection fraction was  
estimated in the range of 65 % to 70 %. Mitral valve: There was trivial regurgitation. Aortic valve: There was no stenosis. Tricuspid valve: There was trivial regurgitation. Pulmonary artery  
systolic pressure: 30 mmHg. GI/Hepatic/Renal 
  
 
 
Renal disease: CRI Comments: Right Renal Mass H/O Diverticulosis H/O colon polyps Endo/Other Diabetes: type 2 Other Findings Physical Exam 
 
Airway Mallampati: I 
TM Distance: 4 - 6 cm Neck ROM: normal range of motion Mouth opening: Normal 
 
 Cardiovascular Rhythm: regular Rate: normal 
 
 
Pertinent negatives: No murmur Dental 
 
Dentition: Caps/crowns Pulmonary Breath sounds clear to auscultation Abdominal 
GI exam deferred Other Findings Anesthetic Plan ASA: 3 Anesthesia type: general 
 
 
 
 
Induction: Intravenous Anesthetic plan and risks discussed with: Patient

## 2020-08-24 ENCOUNTER — ANESTHESIA (OUTPATIENT)
Dept: SURGERY | Age: 75
DRG: 658 | End: 2020-08-24
Payer: MEDICARE

## 2020-08-24 ENCOUNTER — HOSPITAL ENCOUNTER (INPATIENT)
Age: 75
LOS: 2 days | Discharge: HOME | DRG: 658 | End: 2020-08-26
Attending: UROLOGY | Admitting: UROLOGY
Payer: MEDICARE

## 2020-08-24 PROBLEM — N28.89 RIGHT RENAL MASS: Status: ACTIVE | Noted: 2020-08-24

## 2020-08-24 LAB
GLUCOSE BLD STRIP.AUTO-MCNC: 185 MG/DL (ref 65–100)
GLUCOSE BLD STRIP.AUTO-MCNC: 263 MG/DL (ref 65–100)
GLUCOSE BLD STRIP.AUTO-MCNC: 301 MG/DL (ref 65–100)
SERVICE CMNT-IMP: ABNORMAL

## 2020-08-24 PROCEDURE — 74011000250 HC RX REV CODE- 250: Performed by: NURSE ANESTHETIST, CERTIFIED REGISTERED

## 2020-08-24 PROCEDURE — 76010000877 HC OR TIME 2.5 TO 3HR INTENSV - TIER 2: Performed by: UROLOGY

## 2020-08-24 PROCEDURE — 77030011640 HC PAD GRND REM COVD -A: Performed by: UROLOGY

## 2020-08-24 PROCEDURE — 74011250636 HC RX REV CODE- 250/636: Performed by: UROLOGY

## 2020-08-24 PROCEDURE — 77030002996 HC SUT SLK J&J -A: Performed by: UROLOGY

## 2020-08-24 PROCEDURE — 74011636637 HC RX REV CODE- 636/637: Performed by: ANESTHESIOLOGY

## 2020-08-24 PROCEDURE — 65270000029 HC RM PRIVATE

## 2020-08-24 PROCEDURE — 77030013079 HC BLNKT BAIR HGGR 3M -A

## 2020-08-24 PROCEDURE — 77030002916 HC SUT ETHLN J&J -A: Performed by: UROLOGY

## 2020-08-24 PROCEDURE — 77030012770 HC TRCR OPT FX AMR -B: Performed by: UROLOGY

## 2020-08-24 PROCEDURE — 77030018684: Performed by: UROLOGY

## 2020-08-24 PROCEDURE — 0TT04ZZ RESECTION OF RIGHT KIDNEY, PERCUTANEOUS ENDOSCOPIC APPROACH: ICD-10-PCS | Performed by: UROLOGY

## 2020-08-24 PROCEDURE — 77030026438 HC STYL ET INTUB CARD -A

## 2020-08-24 PROCEDURE — 74011250636 HC RX REV CODE- 250/636: Performed by: NURSE ANESTHETIST, CERTIFIED REGISTERED

## 2020-08-24 PROCEDURE — 77030040361 HC SLV COMPR DVT MDII -B: Performed by: UROLOGY

## 2020-08-24 PROCEDURE — 8E0W4CZ ROBOTIC ASSISTED PROCEDURE OF TRUNK REGION, PERCUTANEOUS ENDOSCOPIC APPROACH: ICD-10-PCS | Performed by: UROLOGY

## 2020-08-24 PROCEDURE — 77030010935 HC CLP LIG ABSRB TELE -B: Performed by: UROLOGY

## 2020-08-24 PROCEDURE — 77030016151 HC PROTCTR LNS DFOG COVD -B: Performed by: UROLOGY

## 2020-08-24 PROCEDURE — 77030037241 HC PRT ACC BLDLSS AIRSEAL CNMD -B: Performed by: UROLOGY

## 2020-08-24 PROCEDURE — 88307 TISSUE EXAM BY PATHOLOGIST: CPT

## 2020-08-24 PROCEDURE — 77030008771 HC TU NG SALEM SUMP -A

## 2020-08-24 PROCEDURE — 74011250636 HC RX REV CODE- 250/636

## 2020-08-24 PROCEDURE — 77030020061 HC IV BLD WRMR ADMIN SET 3M -B

## 2020-08-24 PROCEDURE — 74011000250 HC RX REV CODE- 250: Performed by: UROLOGY

## 2020-08-24 PROCEDURE — 74011000250 HC RX REV CODE- 250

## 2020-08-24 PROCEDURE — 77030035277 HC OBTRTR BLDELSS DISP INTU -B: Performed by: UROLOGY

## 2020-08-24 PROCEDURE — 77030031139 HC SUT VCRL2 J&J -A: Performed by: UROLOGY

## 2020-08-24 PROCEDURE — 77030005513 HC CATH URETH FOL11 MDII -B: Performed by: UROLOGY

## 2020-08-24 PROCEDURE — 77030020703 HC SEAL CANN DISP INTU -B: Performed by: UROLOGY

## 2020-08-24 PROCEDURE — 76210000017 HC OR PH I REC 1.5 TO 2 HR: Performed by: UROLOGY

## 2020-08-24 PROCEDURE — 77030008684 HC TU ET CUF COVD -B

## 2020-08-24 PROCEDURE — 77030008756 HC TU IRR SUC STRY -B: Performed by: UROLOGY

## 2020-08-24 PROCEDURE — 77030018846 HC SOL IRR STRL H20 ICUM -A: Performed by: UROLOGY

## 2020-08-24 PROCEDURE — 77030002973 HC SUT PLEDG CV SFT OVL TELE -B: Performed by: UROLOGY

## 2020-08-24 PROCEDURE — 76060000036 HC ANESTHESIA 2.5 TO 3 HR: Performed by: UROLOGY

## 2020-08-24 PROCEDURE — 77030018390 HC SPNG HEMSTAT2 J&J -B: Performed by: UROLOGY

## 2020-08-24 PROCEDURE — 77030040922 HC BLNKT HYPOTHRM STRY -A

## 2020-08-24 PROCEDURE — 82962 GLUCOSE BLOOD TEST: CPT

## 2020-08-24 PROCEDURE — 77030033162 HC PCH SPEC RTVR ENDOSC AMR -B: Performed by: UROLOGY

## 2020-08-24 PROCEDURE — 77030002933 HC SUT MCRYL J&J -A: Performed by: UROLOGY

## 2020-08-24 PROCEDURE — 77030010507 HC ADH SKN DERMBND J&J -B: Performed by: UROLOGY

## 2020-08-24 PROCEDURE — 77030003666 HC NDL SPINAL BD -A: Performed by: UROLOGY

## 2020-08-24 PROCEDURE — 74011250637 HC RX REV CODE- 250/637: Performed by: UROLOGY

## 2020-08-24 PROCEDURE — 77030035029 HC NDL INSUF VERES DISP COVD -B: Performed by: UROLOGY

## 2020-08-24 RX ORDER — GLYCOPYRROLATE 0.2 MG/ML
INJECTION INTRAMUSCULAR; INTRAVENOUS AS NEEDED
Status: DISCONTINUED | OUTPATIENT
Start: 2020-08-24 | End: 2020-08-24 | Stop reason: HOSPADM

## 2020-08-24 RX ORDER — ACETAMINOPHEN 500 MG
1000 TABLET ORAL ONCE
Status: COMPLETED | OUTPATIENT
Start: 2020-08-24 | End: 2020-08-24

## 2020-08-24 RX ORDER — DEXAMETHASONE SODIUM PHOSPHATE 4 MG/ML
INJECTION, SOLUTION INTRA-ARTICULAR; INTRALESIONAL; INTRAMUSCULAR; INTRAVENOUS; SOFT TISSUE AS NEEDED
Status: DISCONTINUED | OUTPATIENT
Start: 2020-08-24 | End: 2020-08-24 | Stop reason: HOSPADM

## 2020-08-24 RX ORDER — SODIUM CHLORIDE 0.9 % (FLUSH) 0.9 %
5-40 SYRINGE (ML) INJECTION EVERY 8 HOURS
Status: DISCONTINUED | OUTPATIENT
Start: 2020-08-24 | End: 2020-08-24 | Stop reason: HOSPADM

## 2020-08-24 RX ORDER — EPHEDRINE SULFATE/0.9% NACL/PF 50 MG/5 ML
SYRINGE (ML) INTRAVENOUS AS NEEDED
Status: DISCONTINUED | OUTPATIENT
Start: 2020-08-24 | End: 2020-08-24 | Stop reason: HOSPADM

## 2020-08-24 RX ORDER — TRAMADOL HYDROCHLORIDE 50 MG/1
50 TABLET ORAL
Status: DISCONTINUED | OUTPATIENT
Start: 2020-08-24 | End: 2020-08-26 | Stop reason: HOSPADM

## 2020-08-24 RX ORDER — METFORMIN HYDROCHLORIDE 500 MG/1
500 TABLET ORAL 2 TIMES DAILY WITH MEALS
Status: DISCONTINUED | OUTPATIENT
Start: 2020-08-24 | End: 2020-08-26 | Stop reason: HOSPADM

## 2020-08-24 RX ORDER — NEOSTIGMINE METHYLSULFATE 1 MG/ML
INJECTION, SOLUTION INTRAVENOUS AS NEEDED
Status: DISCONTINUED | OUTPATIENT
Start: 2020-08-24 | End: 2020-08-24 | Stop reason: HOSPADM

## 2020-08-24 RX ORDER — GABAPENTIN 300 MG/1
600 CAPSULE ORAL ONCE
Status: COMPLETED | OUTPATIENT
Start: 2020-08-24 | End: 2020-08-24

## 2020-08-24 RX ORDER — SUCCINYLCHOLINE CHLORIDE 20 MG/ML
INJECTION INTRAMUSCULAR; INTRAVENOUS AS NEEDED
Status: DISCONTINUED | OUTPATIENT
Start: 2020-08-24 | End: 2020-08-24 | Stop reason: HOSPADM

## 2020-08-24 RX ORDER — MIDAZOLAM HYDROCHLORIDE 1 MG/ML
INJECTION, SOLUTION INTRAMUSCULAR; INTRAVENOUS AS NEEDED
Status: DISCONTINUED | OUTPATIENT
Start: 2020-08-24 | End: 2020-08-24 | Stop reason: HOSPADM

## 2020-08-24 RX ORDER — NALOXONE HYDROCHLORIDE 0.4 MG/ML
0.4 INJECTION, SOLUTION INTRAMUSCULAR; INTRAVENOUS; SUBCUTANEOUS AS NEEDED
Status: DISCONTINUED | OUTPATIENT
Start: 2020-08-24 | End: 2020-08-26 | Stop reason: HOSPADM

## 2020-08-24 RX ORDER — LIDOCAINE HYDROCHLORIDE ANHYDROUS AND DEXTROSE MONOHYDRATE .4; 5 G/100ML; G/100ML
INJECTION, SOLUTION INTRAVENOUS
Status: DISCONTINUED | OUTPATIENT
Start: 2020-08-24 | End: 2020-08-24 | Stop reason: HOSPADM

## 2020-08-24 RX ORDER — MAGNESIUM SULFATE HEPTAHYDRATE 40 MG/ML
INJECTION, SOLUTION INTRAVENOUS AS NEEDED
Status: DISCONTINUED | OUTPATIENT
Start: 2020-08-24 | End: 2020-08-24 | Stop reason: HOSPADM

## 2020-08-24 RX ORDER — HYDROMORPHONE HYDROCHLORIDE 1 MG/ML
0.2 INJECTION, SOLUTION INTRAMUSCULAR; INTRAVENOUS; SUBCUTANEOUS
Status: DISCONTINUED | OUTPATIENT
Start: 2020-08-24 | End: 2020-08-24 | Stop reason: HOSPADM

## 2020-08-24 RX ORDER — FENTANYL CITRATE 50 UG/ML
25 INJECTION, SOLUTION INTRAMUSCULAR; INTRAVENOUS
Status: DISCONTINUED | OUTPATIENT
Start: 2020-08-24 | End: 2020-08-24 | Stop reason: HOSPADM

## 2020-08-24 RX ORDER — ALPRAZOLAM 0.5 MG/1
0.5 TABLET ORAL
Status: DISCONTINUED | OUTPATIENT
Start: 2020-08-24 | End: 2020-08-26 | Stop reason: HOSPADM

## 2020-08-24 RX ORDER — ONDANSETRON 2 MG/ML
4 INJECTION INTRAMUSCULAR; INTRAVENOUS
Status: DISCONTINUED | OUTPATIENT
Start: 2020-08-24 | End: 2020-08-26 | Stop reason: HOSPADM

## 2020-08-24 RX ORDER — SODIUM CHLORIDE 0.9 % (FLUSH) 0.9 %
5-40 SYRINGE (ML) INJECTION AS NEEDED
Status: DISCONTINUED | OUTPATIENT
Start: 2020-08-24 | End: 2020-08-24 | Stop reason: HOSPADM

## 2020-08-24 RX ORDER — ATORVASTATIN CALCIUM 10 MG/1
10 TABLET, FILM COATED ORAL DAILY
Status: DISCONTINUED | OUTPATIENT
Start: 2020-08-25 | End: 2020-08-26 | Stop reason: HOSPADM

## 2020-08-24 RX ORDER — SODIUM CHLORIDE, SODIUM LACTATE, POTASSIUM CHLORIDE, CALCIUM CHLORIDE 600; 310; 30; 20 MG/100ML; MG/100ML; MG/100ML; MG/100ML
INJECTION, SOLUTION INTRAVENOUS
Status: DISCONTINUED | OUTPATIENT
Start: 2020-08-24 | End: 2020-08-24 | Stop reason: HOSPADM

## 2020-08-24 RX ORDER — LIDOCAINE HYDROCHLORIDE 10 MG/ML
0.1 INJECTION, SOLUTION EPIDURAL; INFILTRATION; INTRACAUDAL; PERINEURAL AS NEEDED
Status: DISCONTINUED | OUTPATIENT
Start: 2020-08-24 | End: 2020-08-24 | Stop reason: HOSPADM

## 2020-08-24 RX ORDER — KETAMINE HYDROCHLORIDE 10 MG/ML
INJECTION, SOLUTION INTRAMUSCULAR; INTRAVENOUS AS NEEDED
Status: DISCONTINUED | OUTPATIENT
Start: 2020-08-24 | End: 2020-08-24 | Stop reason: HOSPADM

## 2020-08-24 RX ORDER — PHENYLEPHRINE HCL IN 0.9% NACL 0.4MG/10ML
SYRINGE (ML) INTRAVENOUS AS NEEDED
Status: DISCONTINUED | OUTPATIENT
Start: 2020-08-24 | End: 2020-08-24 | Stop reason: HOSPADM

## 2020-08-24 RX ORDER — FENTANYL CITRATE 50 UG/ML
INJECTION, SOLUTION INTRAMUSCULAR; INTRAVENOUS AS NEEDED
Status: DISCONTINUED | OUTPATIENT
Start: 2020-08-24 | End: 2020-08-24 | Stop reason: HOSPADM

## 2020-08-24 RX ORDER — ACETAMINOPHEN 500 MG
1000 TABLET ORAL EVERY 6 HOURS
Status: DISCONTINUED | OUTPATIENT
Start: 2020-08-24 | End: 2020-08-26 | Stop reason: HOSPADM

## 2020-08-24 RX ORDER — GABAPENTIN 100 MG/1
200 CAPSULE ORAL 2 TIMES DAILY
Status: DISCONTINUED | OUTPATIENT
Start: 2020-08-24 | End: 2020-08-26 | Stop reason: HOSPADM

## 2020-08-24 RX ORDER — LIDOCAINE HYDROCHLORIDE 20 MG/ML
INJECTION, SOLUTION EPIDURAL; INFILTRATION; INTRACAUDAL; PERINEURAL AS NEEDED
Status: DISCONTINUED | OUTPATIENT
Start: 2020-08-24 | End: 2020-08-24 | Stop reason: HOSPADM

## 2020-08-24 RX ORDER — PROPOFOL 10 MG/ML
INJECTION, EMULSION INTRAVENOUS AS NEEDED
Status: DISCONTINUED | OUTPATIENT
Start: 2020-08-24 | End: 2020-08-24 | Stop reason: HOSPADM

## 2020-08-24 RX ORDER — BUPIVACAINE HYDROCHLORIDE 5 MG/ML
INJECTION, SOLUTION EPIDURAL; INTRACAUDAL AS NEEDED
Status: DISCONTINUED | OUTPATIENT
Start: 2020-08-24 | End: 2020-08-24 | Stop reason: HOSPADM

## 2020-08-24 RX ORDER — ONDANSETRON 2 MG/ML
INJECTION INTRAMUSCULAR; INTRAVENOUS AS NEEDED
Status: DISCONTINUED | OUTPATIENT
Start: 2020-08-24 | End: 2020-08-24 | Stop reason: HOSPADM

## 2020-08-24 RX ORDER — DIPHENHYDRAMINE HYDROCHLORIDE 50 MG/ML
12.5 INJECTION, SOLUTION INTRAMUSCULAR; INTRAVENOUS AS NEEDED
Status: DISCONTINUED | OUTPATIENT
Start: 2020-08-24 | End: 2020-08-24 | Stop reason: HOSPADM

## 2020-08-24 RX ORDER — SODIUM CHLORIDE, SODIUM LACTATE, POTASSIUM CHLORIDE, CALCIUM CHLORIDE 600; 310; 30; 20 MG/100ML; MG/100ML; MG/100ML; MG/100ML
75 INJECTION, SOLUTION INTRAVENOUS CONTINUOUS
Status: DISCONTINUED | OUTPATIENT
Start: 2020-08-24 | End: 2020-08-24 | Stop reason: HOSPADM

## 2020-08-24 RX ORDER — SODIUM CHLORIDE, SODIUM LACTATE, POTASSIUM CHLORIDE, CALCIUM CHLORIDE 600; 310; 30; 20 MG/100ML; MG/100ML; MG/100ML; MG/100ML
25 INJECTION, SOLUTION INTRAVENOUS CONTINUOUS
Status: DISCONTINUED | OUTPATIENT
Start: 2020-08-24 | End: 2020-08-24 | Stop reason: HOSPADM

## 2020-08-24 RX ORDER — OXYCODONE HYDROCHLORIDE 5 MG/1
5 TABLET ORAL
Status: DISCONTINUED | OUTPATIENT
Start: 2020-08-24 | End: 2020-08-26 | Stop reason: HOSPADM

## 2020-08-24 RX ORDER — ROCURONIUM BROMIDE 10 MG/ML
INJECTION, SOLUTION INTRAVENOUS AS NEEDED
Status: DISCONTINUED | OUTPATIENT
Start: 2020-08-24 | End: 2020-08-24 | Stop reason: HOSPADM

## 2020-08-24 RX ORDER — SODIUM CHLORIDE, SODIUM LACTATE, POTASSIUM CHLORIDE, CALCIUM CHLORIDE 600; 310; 30; 20 MG/100ML; MG/100ML; MG/100ML; MG/100ML
75 INJECTION, SOLUTION INTRAVENOUS CONTINUOUS
Status: DISCONTINUED | OUTPATIENT
Start: 2020-08-24 | End: 2020-08-25

## 2020-08-24 RX ORDER — FAMOTIDINE 20 MG/1
20 TABLET, FILM COATED ORAL 2 TIMES DAILY
Status: DISCONTINUED | OUTPATIENT
Start: 2020-08-25 | End: 2020-08-26 | Stop reason: HOSPADM

## 2020-08-24 RX ADMIN — PROPOFOL 150 MG: 10 INJECTION, EMULSION INTRAVENOUS at 13:29

## 2020-08-24 RX ADMIN — ROCURONIUM BROMIDE 5 MG: 10 INJECTION INTRAVENOUS at 13:29

## 2020-08-24 RX ADMIN — SODIUM CHLORIDE, POTASSIUM CHLORIDE, SODIUM LACTATE AND CALCIUM CHLORIDE: 600; 310; 30; 20 INJECTION, SOLUTION INTRAVENOUS at 13:07

## 2020-08-24 RX ADMIN — LIDOCAINE HYDROCHLORIDE 2 MG/KG/HR: 4 INJECTION, SOLUTION INTRAVENOUS at 13:31

## 2020-08-24 RX ADMIN — Medication 5 ML: at 16:10

## 2020-08-24 RX ADMIN — GLYCOPYRROLATE 0.4 MG: 0.2 INJECTION, SOLUTION INTRAMUSCULAR; INTRAVENOUS at 15:40

## 2020-08-24 RX ADMIN — FENTANYL CITRATE 50 MCG: 50 INJECTION, SOLUTION INTRAMUSCULAR; INTRAVENOUS at 14:26

## 2020-08-24 RX ADMIN — ONDANSETRON HYDROCHLORIDE 4 MG: 2 INJECTION, SOLUTION INTRAMUSCULAR; INTRAVENOUS at 15:06

## 2020-08-24 RX ADMIN — SUCCINYLCHOLINE CHLORIDE 120 MG: 20 INJECTION, SOLUTION INTRAMUSCULAR; INTRAVENOUS at 13:29

## 2020-08-24 RX ADMIN — FENTANYL CITRATE 50 MCG: 50 INJECTION, SOLUTION INTRAMUSCULAR; INTRAVENOUS at 13:29

## 2020-08-24 RX ADMIN — SODIUM CHLORIDE, POTASSIUM CHLORIDE, SODIUM LACTATE AND CALCIUM CHLORIDE: 600; 310; 30; 20 INJECTION, SOLUTION INTRAVENOUS at 13:21

## 2020-08-24 RX ADMIN — MAGNESIUM SULFATE IN WATER 2 G: 40 INJECTION, SOLUTION INTRAVENOUS at 13:28

## 2020-08-24 RX ADMIN — METFORMIN HYDROCHLORIDE 500 MG: 500 TABLET ORAL at 18:38

## 2020-08-24 RX ADMIN — Medication 40 MCG: at 14:53

## 2020-08-24 RX ADMIN — GABAPENTIN 200 MG: 100 CAPSULE ORAL at 18:38

## 2020-08-24 RX ADMIN — Medication 15 MG: at 13:54

## 2020-08-24 RX ADMIN — KETAMINE HYDROCHLORIDE 30 MG: 10 INJECTION, SOLUTION INTRAMUSCULAR; INTRAVENOUS at 13:36

## 2020-08-24 RX ADMIN — Medication 3 MG: at 15:40

## 2020-08-24 RX ADMIN — LIDOCAINE HYDROCHLORIDE 5 MG: 20 INJECTION, SOLUTION EPIDURAL; INFILTRATION; INTRACAUDAL; PERINEURAL at 13:29

## 2020-08-24 RX ADMIN — WATER 2 G: 1 INJECTION INTRAMUSCULAR; INTRAVENOUS; SUBCUTANEOUS at 13:21

## 2020-08-24 RX ADMIN — Medication 3 AMPULE: at 12:03

## 2020-08-24 RX ADMIN — ROCURONIUM BROMIDE 45 MG: 10 INJECTION INTRAVENOUS at 13:34

## 2020-08-24 RX ADMIN — HUMAN INSULIN 7 UNITS: 100 INJECTION, SOLUTION SUBCUTANEOUS at 17:09

## 2020-08-24 RX ADMIN — Medication 1000 MG: at 12:03

## 2020-08-24 RX ADMIN — Medication 1 AMPULE: at 21:14

## 2020-08-24 RX ADMIN — MIDAZOLAM HYDROCHLORIDE 2 MG: 1 INJECTION, SOLUTION INTRAMUSCULAR; INTRAVENOUS at 13:20

## 2020-08-24 RX ADMIN — GABAPENTIN 600 MG: 300 CAPSULE ORAL at 12:03

## 2020-08-24 RX ADMIN — DEXAMETHASONE SODIUM PHOSPHATE 8 MG: 4 INJECTION, SOLUTION INTRAMUSCULAR; INTRAVENOUS at 14:03

## 2020-08-24 RX ADMIN — Medication 40 MCG: at 14:37

## 2020-08-24 RX ADMIN — SODIUM CHLORIDE, SODIUM LACTATE, POTASSIUM CHLORIDE, AND CALCIUM CHLORIDE 75 ML/HR: 600; 310; 30; 20 INJECTION, SOLUTION INTRAVENOUS at 12:03

## 2020-08-24 RX ADMIN — ACETAMINOPHEN 1000 MG: 500 TABLET ORAL at 18:38

## 2020-08-24 RX ADMIN — TRAMADOL HYDROCHLORIDE 50 MG: 50 TABLET, FILM COATED ORAL at 18:38

## 2020-08-24 RX ADMIN — OXYCODONE 5 MG: 5 TABLET ORAL at 21:15

## 2020-08-24 NOTE — PERIOP NOTES
Handoff Report from Operating Room to PACU Report received from SCOTT Damico RN and Keith Irving CRNA regarding Nicki Bernstein. Surgeon(s): 
Tamara Severs, MD Madelyn Mallow, MD  And Procedure(s) (LRB): 
ROBOTIC ASSISTED LAPAROSCOPIC RIGHT NEPHRECTOMY (Right)  confirmed  
with allergies, dressings and local anesthetic discussed. Anesthesia type, drugs, patient history, complications, estimated blood loss, vital signs, intake and output, and last pain medication, lines, reversal medications and temperature were reviewed.

## 2020-08-24 NOTE — PERIOP NOTES
14:17 Spoke with patients , Jens Villanueva, and informed him of procedure start. Will continue to update.

## 2020-08-24 NOTE — PERIOP NOTES
Reported patient blood glucose of 263 to Dr. Shannan Taylor, received order for 7 units regular insulin subcutaneously [address] [facility] TRANSFER - OUT REPORT: 
 
Verbal report given to Hussain Nieto RN on Ruby Litten  being transferred to [facility], 2174 for routine post - op Report consisted of patients Situation, Background, Assessment and  
Recommendations(SBAR). Information from the following report(s) SBAR, Kardex, OR Summary, Intake/Output, MAR and Cardiac Rhythm NSR was reviewed with the receiving nurse. Lines:  
Peripheral IV 08/24/20 Left Arm (Active) Site Assessment Clean, dry, & intact 08/24/20 1730 Phlebitis Assessment 0 08/24/20 1730 Infiltration Assessment 0 08/24/20 1730 Dressing Status Clean, dry, & intact 08/24/20 1730 Dressing Type Tape;Transparent 08/24/20 1730 Hub Color/Line Status Green; Infusing 08/24/20 1730 Peripheral IV 08/24/20 Right Hand (Active) Site Assessment Clean, dry, & intact 08/24/20 1730 Phlebitis Assessment 0 08/24/20 1730 Infiltration Assessment 0 08/24/20 1730 Dressing Status Clean, dry, & intact 08/24/20 1730 Dressing Type Tape;Transparent 08/24/20 1730 Hub Color/Line Status Pink;Capped 08/24/20 1730 Opportunity for questions and clarification was provided. Patient transported with: 
 [employer] Updated patient's family at time of transfer.

## 2020-08-24 NOTE — BRIEF OP NOTE
Brief Postoperative Note Patient: Antionette Esposito YOB: 1945 MRN: [id number] Date of Procedure: 8/24/2020 Pre-Op Diagnosis: RIGHT RENAL MASS Post-Op Diagnosis: Same as preoperative diagnosis. Procedure(s): 
ROBOTIC ASSISTED LAPAROSCOPIC RIGHT NEPHRECTOMY, (E R A S) Surgeon(s): 
MD Florencio Cole MD 
 
Surgical Assistant: None Anesthesia: General  
 
Estimated Blood Loss (mL): less than 50 Complications: None Specimens: RIGHT KIDNEY Implants: * No implants in log * Drains: * No LDAs found * Findings: SMALL ACCESSORY RIGHT RENAL ARTERY. MASS RUP, RM/LP Electronically Signed by Bunny Garnett MD on 8/24/2020 at 1:23 PM

## 2020-08-24 NOTE — PROGRESS NOTES
General Surgery End of Shift Nursing Note Bedside shift change report given to Dina (oncoming nurse) by Destiney Escobar (offgoing nurse). Report included the following information SBAR, Kardex, Procedure Summary and MAR. Shift worked:   7a-7p Summary of shift:    Pt arrived from PACU. Frequent vitals initiated. Pt complaint of pain in the right side of the abdomen. Medicated, see MAR. Dual skin assessment completed. No pressure injury noted. Issues for physician to address:     
 
Number times ambulated in hallway past shift: 0 Number of times OOB to chair past shift: 0 Pain Management: 
Current medication: Tramadol Patient states pain is manageable on current pain medication: YES 
 
GI: 
 
Current diet:  DIET CLEAR LIQUID 
DIET NUTRITIONAL SUPPLEMENTS Breakfast, Lunch; Ensure [employer] Tolerating current diet: YES Patient Safety: 
 
Falls Score: 1 Bed Alarm On? No 
Sitter? No 
 
Ruthie Hernandez \

## 2020-08-24 NOTE — ANESTHESIA POSTPROCEDURE EVALUATION
Procedure(s): 
ROBOTIC ASSISTED LAPAROSCOPIC RIGHT NEPHRECTOMY. general 
 
Anesthesia Post Evaluation Patient location during evaluation: PACU Note status: Adequate. Level of consciousness: responsive to verbal stimuli and sleepy but conscious Pain management: satisfactory to patient Airway patency: patent Anesthetic complications: no 
Cardiovascular status: acceptable Respiratory status: acceptable Hydration status: acceptable Comments: +Post-Anesthesia Evaluation and Assessment Patient: Stacey Baltazar MRN: [id number]  SSN: [id number] YOB: 1945  Age: 76 y.o. Sex: female Cardiovascular Function/Vital Signs BP (P) 151/61 (BP 1 Location: Right arm, BP Patient Position: At rest)   Pulse 85   Temp 37.1 °C (98.7 °F)   Resp 18   Ht 5' 5.5\" (1.664 m)   Wt 72.2 kg (159 lb 2.8 oz)   SpO2 92%   BMI 26.08 kg/m² Patient is status post Procedure(s): 
ROBOTIC ASSISTED LAPAROSCOPIC RIGHT NEPHRECTOMY. Nausea/Vomiting: Controlled. Postoperative hydration reviewed and adequate. Pain: 
Pain Scale 1: Numeric (0 - 10) (08/24/20 1625) Pain Intensity 1: 0 (08/24/20 1625) Managed. Neurological Status:  
Neuro (WDL): Exceptions to WDL (08/24/20 1625) At baseline. Mental Status and Level of Consciousness: Arousable. Pulmonary Status:  
O2 Device: (P) Room air (08/24/20 1715) Adequate oxygenation and airway patent. Complications related to anesthesia: None Post-anesthesia assessment completed. No concerns. Signed By: Mirtha Herman MD  
 8/24/2020 Post anesthesia nausea and vomiting:  controlled INITIAL Post-op Vital signs:  
Vitals Value Taken Time /65 8/24/2020  5:30 PM  
Temp 37.1 °C (98.7 °F) 8/24/2020  4:02 PM  
Pulse 88 8/24/2020  5:32 PM  
Resp 19 8/24/2020  5:32 PM  
SpO2 92 % 8/24/2020  5:32 PM  
Vitals shown include unvalidated device data.

## 2020-08-24 NOTE — BRIEF OP NOTE
Brief Postoperative Note Patient: Ruby Litten YOB: 1945 MRN: [id number] Date of Procedure: 8/24/2020 Pre-Op Diagnosis: RENAL MASS Post-Op Diagnosis: Same as preoperative diagnosis. Procedure(s): 
ROBOTIC ASSISTED LAPAROSCOPIC RIGHT NEPHRECTOMY Surgeon(s): 
MD Trinity Sarmiento MD 
 
Surgical Assistant: None Anesthesia: General  
 
Estimated Blood Loss (mL): less than 50 Complications: None Specimens:  
ID Type Source Tests Collected by Time Destination 1 : Right kidney Preservative Kidney, Right  Ruthann Fuller MD 8/24/2020 1517 Pathology Implants: HOWARD Drains: * No LDAs found * Findings: 2 RENAL ARTERIES, 2 RIGHT RENAL MASSES Electronically Signed by Georgie Travis MD on 8/24/2020 at 3:42 PM
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
## 2020-08-25 LAB
ANION GAP SERPL CALC-SCNC: 6 MMOL/L (ref 5–15)
BUN SERPL-MCNC: 13 MG/DL (ref 6–20)
BUN/CREAT SERPL: 12 (ref 12–20)
CALCIUM SERPL-MCNC: 8.1 MG/DL (ref 8.5–10.1)
CHLORIDE SERPL-SCNC: 100 MMOL/L (ref 97–108)
CO2 SERPL-SCNC: 24 MMOL/L (ref 21–32)
CREAT SERPL-MCNC: 1.11 MG/DL (ref 0.55–1.02)
GLUCOSE BLD STRIP.AUTO-MCNC: 191 MG/DL (ref 65–100)
GLUCOSE BLD STRIP.AUTO-MCNC: 212 MG/DL (ref 65–100)
GLUCOSE BLD STRIP.AUTO-MCNC: 225 MG/DL (ref 65–100)
GLUCOSE BLD STRIP.AUTO-MCNC: 258 MG/DL (ref 65–100)
GLUCOSE SERPL-MCNC: 228 MG/DL (ref 65–100)
HGB BLD-MCNC: 9.7 G/DL (ref 11.5–16)
POTASSIUM SERPL-SCNC: 4.7 MMOL/L (ref 3.5–5.1)
SERVICE CMNT-IMP: ABNORMAL
SODIUM SERPL-SCNC: 130 MMOL/L (ref 136–145)

## 2020-08-25 PROCEDURE — 74011250637 HC RX REV CODE- 250/637: Performed by: UROLOGY

## 2020-08-25 PROCEDURE — 82962 GLUCOSE BLOOD TEST: CPT

## 2020-08-25 PROCEDURE — 85018 HEMOGLOBIN: CPT

## 2020-08-25 PROCEDURE — 74011636637 HC RX REV CODE- 636/637: Performed by: NURSE PRACTITIONER

## 2020-08-25 PROCEDURE — 65270000029 HC RM PRIVATE

## 2020-08-25 PROCEDURE — 80048 BASIC METABOLIC PNL TOTAL CA: CPT

## 2020-08-25 PROCEDURE — 74011250636 HC RX REV CODE- 250/636: Performed by: UROLOGY

## 2020-08-25 PROCEDURE — 74011250636 HC RX REV CODE- 250/636: Performed by: NURSE PRACTITIONER

## 2020-08-25 PROCEDURE — 36415 COLL VENOUS BLD VENIPUNCTURE: CPT

## 2020-08-25 RX ORDER — SODIUM CHLORIDE 9 MG/ML
75 INJECTION, SOLUTION INTRAVENOUS CONTINUOUS
Status: DISCONTINUED | OUTPATIENT
Start: 2020-08-25 | End: 2020-08-26

## 2020-08-25 RX ORDER — DEXTROSE 50 % IN WATER (D50W) INTRAVENOUS SYRINGE
12.5-25 AS NEEDED
Status: DISCONTINUED | OUTPATIENT
Start: 2020-08-25 | End: 2020-08-26 | Stop reason: HOSPADM

## 2020-08-25 RX ORDER — MAGNESIUM SULFATE 100 %
4 CRYSTALS MISCELLANEOUS AS NEEDED
Status: DISCONTINUED | OUTPATIENT
Start: 2020-08-25 | End: 2020-08-26 | Stop reason: HOSPADM

## 2020-08-25 RX ORDER — CEPHALEXIN 500 MG/1
500 CAPSULE ORAL 3 TIMES DAILY
Qty: 21 CAP | Refills: 0 | Status: SHIPPED | OUTPATIENT
Start: 2020-08-25 | End: 2020-09-01

## 2020-08-25 RX ADMIN — METFORMIN HYDROCHLORIDE 500 MG: 500 TABLET ORAL at 17:39

## 2020-08-25 RX ADMIN — ACETAMINOPHEN 1000 MG: 500 TABLET ORAL at 05:50

## 2020-08-25 RX ADMIN — ACETAMINOPHEN 1000 MG: 500 TABLET ORAL at 12:34

## 2020-08-25 RX ADMIN — ACETAMINOPHEN 1000 MG: 500 TABLET ORAL at 00:37

## 2020-08-25 RX ADMIN — FAMOTIDINE 20 MG: 20 TABLET, FILM COATED ORAL at 08:51

## 2020-08-25 RX ADMIN — ONDANSETRON 4 MG: 2 INJECTION INTRAMUSCULAR; INTRAVENOUS at 21:56

## 2020-08-25 RX ADMIN — SODIUM CHLORIDE 75 ML/HR: 900 INJECTION, SOLUTION INTRAVENOUS at 13:45

## 2020-08-25 RX ADMIN — ATORVASTATIN CALCIUM 10 MG: 10 TABLET, FILM COATED ORAL at 08:51

## 2020-08-25 RX ADMIN — TRAMADOL HYDROCHLORIDE 50 MG: 50 TABLET, FILM COATED ORAL at 05:50

## 2020-08-25 RX ADMIN — GABAPENTIN 200 MG: 100 CAPSULE ORAL at 08:51

## 2020-08-25 RX ADMIN — Medication 1 AMPULE: at 21:49

## 2020-08-25 RX ADMIN — ACETAMINOPHEN 1000 MG: 500 TABLET ORAL at 18:57

## 2020-08-25 RX ADMIN — GABAPENTIN 200 MG: 100 CAPSULE ORAL at 17:39

## 2020-08-25 RX ADMIN — Medication 1 AMPULE: at 08:53

## 2020-08-25 RX ADMIN — METFORMIN HYDROCHLORIDE 500 MG: 500 TABLET ORAL at 08:51

## 2020-08-25 RX ADMIN — HUMAN INSULIN 3 UNITS: 100 INJECTION, SOLUTION SUBCUTANEOUS at 17:36

## 2020-08-25 RX ADMIN — SODIUM CHLORIDE, SODIUM LACTATE, POTASSIUM CHLORIDE, AND CALCIUM CHLORIDE 75 ML/HR: 600; 310; 30; 20 INJECTION, SOLUTION INTRAVENOUS at 00:42

## 2020-08-25 RX ADMIN — FAMOTIDINE 20 MG: 20 TABLET, FILM COATED ORAL at 17:39

## 2020-08-25 NOTE — PROGRESS NOTES
Problem: Diabetes Self-Management Goal: *Disease process and treatment process Description: Define diabetes and identify own type of diabetes; list 3 options for treating diabetes. Outcome: Progressing Towards Goal 
Goal: *Incorporating nutritional management into lifestyle Description: Describe effect of type, amount and timing of food on blood glucose; list 3 methods for planning meals. Outcome: Progressing Towards Goal 
Goal: *Incorporating physical activity into lifestyle Description: State effect of exercise on blood glucose levels. Outcome: Progressing Towards Goal 
Goal: *Developing strategies to promote health/change behavior Description: Define the ABC's of diabetes; identify appropriate screenings, schedule and personal plan for screenings. Outcome: Progressing Towards Goal 
Goal: *Using medications safely Description: State effect of diabetes medications on diabetes; name diabetes medication taking, action and side effects. Outcome: Progressing Towards Goal 
Goal: *Monitoring blood glucose, interpreting and using results Description: Identify recommended blood glucose targets  and personal targets. Outcome: Progressing Towards Goal 
Goal: *Prevention, detection, treatment of acute complications Description: List symptoms of hyper- and hypoglycemia; describe how to treat low blood sugar and actions for lowering  high blood glucose level. Outcome: Progressing Towards Goal 
Goal: *Prevention, detection and treatment of chronic complications Description: Define the natural course of diabetes and describe the relationship of blood glucose levels to long term complications of diabetes. Outcome: Progressing Towards Goal 
Goal: *Developing strategies to address psychosocial issues Description: Describe feelings about living with diabetes; identify support needed and support network Outcome: Progressing Towards Goal 
Goal: *Sick day guidelines Outcome: Progressing Towards Goal 
 Goal: *Patient Specific Goal (EDIT GOAL, INSERT TEXT) Outcome: Progressing Towards Goal 
  
Problem: Falls - Risk of 
Goal: *Absence of Falls Description: Document Stan Perez Fall Risk and appropriate interventions in the flowsheet. Outcome: Progressing Towards Goal 
Note: Fall Risk Interventions: 
  
 
  
 
Medication Interventions: Teach patient to arise slowly Problem: Patient Education: Go to Patient Education Activity Goal: Patient/Family Education Outcome: Progressing Towards Goal

## 2020-08-25 NOTE — PROGRESS NOTES
Plan: 
-Home  
-F/u appts 
-Family/friends to transport at d/c  
-RUR 11% 1:09PM 
CM chart review. RUR of 11%. Pt is a 77 yo female admitted for right renal mass. POD#1 from right nephrectomy. Plan for possible d/c home tomorrow with f/u appt. Urology f/u appt scheduled in a week, per order, and added to AVS. Pt to void prior to d/c. CM will continue to follow and assist with d/c planning. Stephany Vaca, MSW Care Manager

## 2020-08-25 NOTE — DISCHARGE SUMMARY
Urology Discharge Summary Patient: Patsy Toro MRN: [id number]  SSN: [id number] YOB: 1945  Age: 76 y.o. Sex: female ADMISSION:  to Lyubov Khan MD on 8/24/2020 DATE OF DISCHARGE:  8/25/2020 ADMISSION DIAGNOSIS: Right renal mass [N28.89] DISCHARGE DIAGNOSIS: Same PROCEDURES: Procedure(s): 
ROBOTIC ASSISTED LAPAROSCOPIC RIGHT NEPHRECTOMY COMPLICATIONS: None identified. Current Discharge Medication List  
  
 
 
RECENT LABS:  
Lab Results Component Value Date/Time WBC 6.7 08/12/2020 01:07 PM  
 HCT 36.3 08/12/2020 01:07 PM  
 PLATELET 641 78/20/6827 01:07 PM  
 Sodium 130 (L) 08/25/2020 05:04 AM  
 Potassium 4.7 08/25/2020 05:04 AM  
 Chloride 100 08/25/2020 05:04 AM  
 CO2 24 08/25/2020 05:04 AM  
 BUN 13 08/25/2020 05:04 AM  
 Creatinine 1.11 (H) 08/25/2020 05:04 AM  
 Glucose 228 (H) 08/25/2020 05:04 AM  
 Calcium 8.1 (L) 08/25/2020 05:04 AM  
 Magnesium 1.9 08/12/2020 01:07 PM  
 Phosphorus 2.6 08/12/2020 01:07 PM  
 INR 1.0 08/12/2020 01:07 PM  
 
 
 
  
HOSPITAL COURSE: Patsy Toro underwent Procedure(s): 
ROBOTIC ASSISTED LAPAROSCOPIC RIGHT NEPHRECTOMY on 8/38/2881 without complications. She had an uneventful recovery. Her activity was increased, her diet was advanced and her pain control was transitioned to oral medications. She was discharged to home 1 Day Post-Op. CONDITION AT DISCHARGE:  Good. DISCHARGE TO:     Home. FOLLOWUP:  1 week in the [facility].  
 
 
Russell Whiting NP 8/25/2020 11:03 AM  
 palpated, no hepatosplenomegally, no bruit. MUSCULOSKELETAL:  there is no redness, warmth, or swelling of the joints   No leg edema. NEUROLOGIC:  Awake, alert, oriented to name, place and time. SKIN:  no bruising or bleeding, normal skin color, texture, turgor and no jaundice    DATA:   ECG:  Date: Mari 15, 2019  I have reviewed EKG with the following interpretation: Atrial fibrillation with nonspecific T wave abnormality  ECHO: Date: April 4, 2019  Summary  Left ventricle is normal in size. Left ventricular systolic function is mildly reduced. Estimated LV EF 40 %. Anterior wall hypokinesis noted. Anteroseptal wall hypokinesis noted. Anterolateral wall hypokinesis noted. Left atrium is severely dilated. Right atrium is severely dilated . Mitral annular calcification is seen. Mild to moderate mitral regurgitation. Moderate to severe tricuspid regurgitation. Moderate to severe pulmonary hypertension. No significant pericardial effusion is seen. Normal aortic root dimension.    Cardiology Labs:  Recent Labs     06/15/19  1030 06/15/19  1502 06/15/19  1830   MYOGLOBIN 39  --   --    TROPONINT NOT REPORTED NOT REPORTED NOT REPORTED     Warfarin PT/INR:  Lab Results   Component Value Date    PROTIME 12.0 04/04/2019    INR 1.2 04/04/2019     CBC:  Lab Results   Component Value Date    WBC 8.4 06/16/2019    RBC 3.75 06/16/2019    HGB 11.3 06/16/2019    HCT 37.3 06/16/2019    MCV 99.5 06/16/2019    MCH 30.1 06/16/2019    MCHC 30.3 06/16/2019    RDW 15.1 06/16/2019     06/16/2019    MPV 10.2 06/16/2019     CMP:  Lab Results   Component Value Date     06/16/2019    K 4.4 06/16/2019    CL 99 06/16/2019    CO2 29 06/16/2019    BUN 36 06/16/2019    CREATININE 1.03 06/16/2019    GFRAA >60 06/16/2019    LABGLOM 50 06/16/2019    GLUCOSE 102 06/16/2019    GLUCOSE 117 09/02/2011    CALCIUM 9.6 06/16/2019     Magnesium:    Lab Results   Component Value Date    MG 2.1 06/16/2019     PTT:    Lab Results Component Value Date    APTT 29.9 04/04/2019     TSH:    Lab Results   Component Value Date    TSH 1.38 06/15/2019     BMP:  Lab Results   Component Value Date     06/16/2019    K 4.4 06/16/2019    CL 99 06/16/2019    CO2 29 06/16/2019    BUN 36 06/16/2019    LABALBU 4.2 11/13/2018    CREATININE 1.03 06/16/2019    CALCIUM 9.6 06/16/2019    GFRAA >60 06/16/2019    LABGLOM 50 06/16/2019    GLUCOSE 102 06/16/2019    GLUCOSE 117 09/02/2011     LIVER PROFILE:No results for input(s): AST, ALT, LABALBU, ALKPHOS, BILITOT, BILIDIR, IBILI, PROT, GLOB, ALBUMIN in the last 72 hours.   FLP:    Lab Results   Component Value Date    CHOL 104 06/16/2019    TRIG 75 06/16/2019    HDL 55 06/16/2019    LDLCHOLESTEROL 34 06/16/2019       Troponin, High Sensitivity 32High   0 - 14 ng/L Final 06/15/2019 10:30 AM - [address] [facility]     Troponin, High Sensitivity 29High   0 - 14 ng/L Final 06/15/2019  3:02 PM [facility]- [address] [facility]                   IMPRESSION  · Acute on chronic systolic and diastolic heart failure, patient reports improvement with IV diuretics so far  · Moderate severe tricuspid regurgitation with severe pulmonary hypertension on echo Doppler study done April 2019  · Mild mitral regurgitation  · Chronic atrial fibrillation, on chronic anticoagulation with low-dose apixaban  · COPD  Patient Active Problem List   Diagnosis    Pneumonia    Sepsis ([address]    Moderate malnutrition ([address]    Pulmonary HTN ([address]    Non-rheumatic mitral regurgitation    Acute on chronic combined systolic and diastolic congestive heart failure (HCC)    Chronic atrial fibrillation (HCC)    Type 2 diabetes mellitus with hyperglycemia (HCC)    Acute bronchitis    Acute congestive heart failure (HCC)    Hypotensive episode    Acute respiratory failure ([address]    Other dysphagia    Unintentional weight loss    Moderate malnutrition ([address]    CHF (congestive heart failure), NYHA class I, acute on chronic, combined ([address]

## 2020-08-25 NOTE — PROGRESS NOTES
General Surgery End of Shift Nursing Note Bedside shift change report given to Abdirahman (oncoming nurse) by GEORGIA (offgoing nurse). Report included the following information SBAR, Kardex, Procedure Summary and Recent Results. Shift worked:   7a-7p Summary of shift:   Pts complained of no pain today. Pt was up to the chair most of th day and ambulated in her room. Pt has been nauseous and has thrown up three times. She states she feels better after throwing up. Pt has moderately tolerated her full liquid diet. Pt has rested for most of the day. Issues for physician to address: none Number times ambulated in hallway past shift: 0 Number of times OOB to chair past shift: 4 Pain Management: 
Current medication: N/A Patient states pain is manageable on current pain medication: YES 
 
GI: 
 
Current diet:  DIET NUTRITIONAL SUPPLEMENTS Breakfast, Lunch; Ensure [employer] DIET FULL LIQUID Tolerating current diet: YES Passing flatus: YES Last Bowel Movement: today Appearance: small, brown, formed Respiratory: 
 
Incentive Spirometer at bedside: YES Patient instructed on use: YES Patient Safety: 
 
Falls Score: 1 Bed Alarm On? No 
Sitter? No 
 
Samantha Jordan

## 2020-08-25 NOTE — OP NOTES
[address] 
OPERATIVE REPORT Name:  Alexander Gallardo 
MR#:  [id number] :  1945 ACCOUNT #:  [de-identified] DATE OF SERVICE:  2020 PREOPERATIVE DIAGNOSIS:  Two right renal masses. POSTOPERATIVE DIAGNOSIS:  Two right renal masses. PROCEDURE PERFORMED:  Robot-assisted laparoscopic right radical nephrectomy. SURGEON:  David Montenegro MD 
 
ASSISTANT:  Delores Lucio MD 
 
ANESTHESIA:  General plus local. 
 
COMPLICATIONS:  None. SPECIMENS REMOVED:  Right kidney. IMPLANTS:  A 16-[place] Maria. ESTIMATED BLOOD LOSS:  Less than 50 mL. FINDINGS:  Right renal mass is adjudged, not amenable to partial resection in a patient with normal renal function and normal left kidney. INDICATIONS:  See above. PROCEDURE IN DETAIL:  She underwent a general endotracheal anesthetic and had a Maria catheter placed sterilely. She was then placed right side up on the beanbag with axillary roll in place. She was then flexed and padded appropriately. She was prepped and draped in sterile fashion. A time-out was called confirming the planned procedure,  0.5% Marcaine was used at all incision points. A spot in the right lower quadrant was chosen for the Veress needle and incised with a #15-blade scalpel. The Veress needle was passed. The drop test and opening pressures were normal.  CO2 was used to create pneumoperitoneum at 15 atmospheres. A right periumbilical incision was then made and the 0-degree lens through the 12-mm AirSeal port was then used to enter the abdomen but there was fat in the way and visualization was poor. All of the abdomen inflated normally, therefore one of the robotic port sites was chosen, incised and the trocar was placed without difficulty. Looking through this back at the periumbilical port, obviously there was just some fat at the end of the trocar, resolved by leaving the trocar further ways back.   The remainder of the robotic and assistant ports were placed in their usual positions without difficulty and the Xi robot was docked. The hot ana were used to incise the white line of Toldt and the right colon was reflected off the retroperitoneum. The liver attachments were freed and the peritoneum adjacent to the liver edge was incised allowing us to lift up the liver. In the retroperitoneum, the gonadal vein was identified and dissected out. The ureter was then identified and dissected out. The fourth arm was used to retract down to the liver and the dissection carried cranially. The duodenum was reflected and the small lower pole artery known to be present was identified, it was dissected out and doubly clipped with Hem-o-Kori clips, then transected. The hilum was then immediately apparent, it was dissected out circumferentially leaving the adrenal in place. Once the dissection between the adrenal and upper pole of the kidney had been completed sufficiently, the articulating Endo TICO 45 mm stapler with vascular load was then used to cross the hilum. Two supporting Hem-o-Loks were then used on the corner of this staple line that was transected freeing the hilum. The upper pole of the kidney was then addressed. The upper pole solid mass was abutting the liver and it was dissected free from the liver edge using sharp dissection and cautery. This was done atraumatically. The remainder of the peritoneum laterally and the posterior lateral attachments were then freed. The ureter was clipped and transected and the specimen was freed, it was put in a drawstring bag for later retrieval.  On retroperitoneum, it was examined, a small amount of clot was suctioned out and inspection was done. There was no evidence of any bleeding. A piece of Surgicel was put over the area of the hilum. The adrenal was judged to be intact as was the liver. The robot was then undocked and the Marcaine was used to reanesthetize the right periumbilical incision with the 5-Chinese epigastric incision for a length of 7 cm. The cautery was then used to transect all layers entering the abdomen. The bagged specimen was withdrawn, it was later examined and the lower pole mass was felt to be very hard, Gerota's fascia was not removed, separately visualized. As the upper pole mass was seen, it had a small tear in it which can only be found with manipulation as there was no entry into the tumor during the dissection. The extraction site was then closed with a running #1 PDS suture. The subcutaneous tissues were irrigated with sterile saline and all of the incision were to be closed with subcuticular running 4-0 Monocryl subcuticular suture and covered with Dermabond completing the procedure which he tolerated well. There were no complications. Needle, sponge and instrument counts were correct. Operative time 
was approximately 90 minutes. She was stable on my departure from the operative suite. Operative findings discussed with her . Francy Thorne MD 
 
 
[id number] 
D:  08/24/2020 15:41 T:  08/25/2020 1:40 
JOB #:  [id number]

## 2020-08-25 NOTE — PROGRESS NOTES
Progress Note Patient: Sofia Joseph MRN: [id number]  SSN: [id number] YOB: 1945  Age: 76 y.o. Sex: female ADMITTED:  2020 to Eliezer Cervantes MD  for Right renal mass [N28.89] Update:  
Pt vomited, has not voided yet. -Pt will stay 1 more night, follow-up in am, hopefully DC in am. 
-Bladder scan. Straight cath for PVR >500 mL. Pt has no prior voiding issues and should be able to void prior to DC. Please bladder scan PRN, straight cath for residuals > 500 mL. -Follow Hgb in am. 
 
Sofia Joseph is 1 Day Post-Op Procedure(s): 
ROBOTIC ASSISTED LAPAROSCOPIC RIGHT NEPHRECTOMY. She is doing well. She has mild incisional pain. She has no nausea. She is tolerating clear liquids. No flatus, abdomen soft, non tender. OOB and up to chair. Indwelling catheter has been removed and patient has not voided yet. T 98.9. VSS  Hgb 9.7  Cr 1.11  Na 130. Vitals:  Temp (24hrs), Av.5 °F (36.9 °C), Min:97.8 °F (36.6 °C), Max:98.9 °F (37.2 °C) Blood pressure 120/52, pulse 89, temperature 98.9 °F (37.2 °C), resp. rate 17, height 5' 5.5\" (1.664 m), weight 72.2 kg (159 lb 2.8 oz), SpO2 98 %. I&O's:   1901 -  0700 In: 600 [I.V.:600] Out: 1150 [Urine:1100] No intake/output data recorded. Exam:   abdomen soft, appropriately TTP at surgical sites. All incisions clean/dry/intact without evidence of infection. Labs:  
Recent Labs  
  20 
6503 HGB 9.7* Recent Labs  
  20 
8303 *  
K 4.7  CO2 24 * BUN 13  
CREA 1.11* CA 8.1* Assessment:  
 
- 1 Day Post-Op Procedure(s): 
ROBOTIC ASSISTED LAPAROSCOPIC RIGHT NEPHRECTOMY Active Problems: 
  Right renal mass (2020) Plan:  
 
-ADAT 
-OOB and ambulate 
-Encourage IS use 
-Maria out, void trial 
-Possible DC today if pain controlled, ambulating w/ no issues, tolerating current diet. -Dr. Rafiq Cordon' to see pt this afternoon. Signed By: Johnson Younger NP - August 25, 2020 Seen with Karli Dinh NP at 12:15 PM Plan as  above.

## 2020-08-25 NOTE — PROGRESS NOTES
Problem: Diabetes Self-Management Goal: *Disease process and treatment process Description: Define diabetes and identify own type of diabetes; list 3 options for treating diabetes. Outcome: Progressing Towards Goal 
Goal: *Incorporating nutritional management into lifestyle Description: Describe effect of type, amount and timing of food on blood glucose; list 3 methods for planning meals. Outcome: Progressing Towards Goal 
Goal: *Incorporating physical activity into lifestyle Description: State effect of exercise on blood glucose levels. Outcome: Progressing Towards Goal 
Goal: *Developing strategies to promote health/change behavior Description: Define the ABC's of diabetes; identify appropriate screenings, schedule and personal plan for screenings. Outcome: Progressing Towards Goal 
Goal: *Using medications safely Description: State effect of diabetes medications on diabetes; name diabetes medication taking, action and side effects. Outcome: Progressing Towards Goal 
Goal: *Monitoring blood glucose, interpreting and using results Description: Identify recommended blood glucose targets  and personal targets. Outcome: Progressing Towards Goal 
Goal: *Prevention, detection, treatment of acute complications Description: List symptoms of hyper- and hypoglycemia; describe how to treat low blood sugar and actions for lowering  high blood glucose level. Outcome: Progressing Towards Goal 
Goal: *Prevention, detection and treatment of chronic complications Description: Define the natural course of diabetes and describe the relationship of blood glucose levels to long term complications of diabetes. Outcome: Progressing Towards Goal 
Goal: *Developing strategies to address psychosocial issues Description: Describe feelings about living with diabetes; identify support needed and support network Outcome: Progressing Towards Goal 
Goal: *Insulin pump training Outcome: Progressing Towards Goal 
 Goal: *Sick day guidelines Outcome: Progressing Towards Goal 
Goal: *Patient Specific Goal (EDIT GOAL, INSERT TEXT) Outcome: Progressing Towards Goal

## 2020-08-26 VITALS
HEIGHT: 66 IN | DIASTOLIC BLOOD PRESSURE: 65 MMHG | BODY MASS INDEX: 25.58 KG/M2 | OXYGEN SATURATION: 96 % | HEART RATE: 76 BPM | SYSTOLIC BLOOD PRESSURE: 148 MMHG | RESPIRATION RATE: 18 BRPM | WEIGHT: 159.17 LBS | TEMPERATURE: 98.6 F

## 2020-08-26 LAB
ANION GAP SERPL CALC-SCNC: 8 MMOL/L (ref 5–15)
BUN SERPL-MCNC: 13 MG/DL (ref 6–20)
BUN/CREAT SERPL: 12 (ref 12–20)
CALCIUM SERPL-MCNC: 8.2 MG/DL (ref 8.5–10.1)
CHLORIDE SERPL-SCNC: 97 MMOL/L (ref 97–108)
CO2 SERPL-SCNC: 23 MMOL/L (ref 21–32)
CREAT SERPL-MCNC: 1.12 MG/DL (ref 0.55–1.02)
GLUCOSE BLD STRIP.AUTO-MCNC: 143 MG/DL (ref 65–100)
GLUCOSE BLD STRIP.AUTO-MCNC: 174 MG/DL (ref 65–100)
GLUCOSE SERPL-MCNC: 134 MG/DL (ref 65–100)
HCT VFR BLD AUTO: 28.4 % (ref 35–47)
HGB BLD-MCNC: 8.9 G/DL (ref 11.5–16)
HGB BLD-MCNC: 9.3 G/DL (ref 11.5–16)
POTASSIUM SERPL-SCNC: 4.2 MMOL/L (ref 3.5–5.1)
SERVICE CMNT-IMP: ABNORMAL
SERVICE CMNT-IMP: ABNORMAL
SODIUM SERPL-SCNC: 128 MMOL/L (ref 136–145)

## 2020-08-26 PROCEDURE — 51798 US URINE CAPACITY MEASURE: CPT

## 2020-08-26 PROCEDURE — 85018 HEMOGLOBIN: CPT

## 2020-08-26 PROCEDURE — 36415 COLL VENOUS BLD VENIPUNCTURE: CPT

## 2020-08-26 PROCEDURE — 74011250636 HC RX REV CODE- 250/636: Performed by: UROLOGY

## 2020-08-26 PROCEDURE — 74011636637 HC RX REV CODE- 636/637: Performed by: NURSE PRACTITIONER

## 2020-08-26 PROCEDURE — 85014 HEMATOCRIT: CPT

## 2020-08-26 PROCEDURE — 82962 GLUCOSE BLOOD TEST: CPT

## 2020-08-26 PROCEDURE — 80048 BASIC METABOLIC PNL TOTAL CA: CPT

## 2020-08-26 PROCEDURE — 74011250637 HC RX REV CODE- 250/637: Performed by: UROLOGY

## 2020-08-26 PROCEDURE — 94760 N-INVAS EAR/PLS OXIMETRY 1: CPT

## 2020-08-26 PROCEDURE — 77010033678 HC OXYGEN DAILY

## 2020-08-26 RX ADMIN — FAMOTIDINE 20 MG: 20 TABLET, FILM COATED ORAL at 08:16

## 2020-08-26 RX ADMIN — GABAPENTIN 200 MG: 100 CAPSULE ORAL at 08:16

## 2020-08-26 RX ADMIN — HUMAN INSULIN 2 UNITS: 100 INJECTION, SOLUTION SUBCUTANEOUS at 08:13

## 2020-08-26 RX ADMIN — ATORVASTATIN CALCIUM 10 MG: 10 TABLET, FILM COATED ORAL at 08:16

## 2020-08-26 RX ADMIN — ACETAMINOPHEN 1000 MG: 500 TABLET ORAL at 12:45

## 2020-08-26 RX ADMIN — ACETAMINOPHEN 1000 MG: 500 TABLET ORAL at 01:06

## 2020-08-26 RX ADMIN — METFORMIN HYDROCHLORIDE 500 MG: 500 TABLET ORAL at 08:16

## 2020-08-26 RX ADMIN — Medication 1 AMPULE: at 08:15

## 2020-08-26 RX ADMIN — ACETAMINOPHEN 1000 MG: 500 TABLET ORAL at 06:30

## 2020-08-26 RX ADMIN — HUMAN INSULIN 2 UNITS: 100 INJECTION, SOLUTION SUBCUTANEOUS at 13:04

## 2020-08-26 RX ADMIN — ONDANSETRON 4 MG: 2 INJECTION INTRAMUSCULAR; INTRAVENOUS at 05:15

## 2020-08-26 NOTE — PROGRESS NOTES
Pt discharged home. Pt provided with discharge education and paperwork. Pt prescriptions sent to her pharmacy. Pt states that she understands her discharge instructions. Pt states that she has all personal belongings at time of discharge. Pt left the unit via wheelchair with the LPN.

## 2020-08-26 NOTE — PROGRESS NOTES
Plan: 
-Home  
-F/u appts 
-Family/friends to transport at d/c 
 
 
1:14PM 
D/c order acknowledged by CM. Pt to d/c home. Urology f/u appt scheduled and added to AVS. Pt ambulatory at time of d/c. Pt ready for d/c from CM perspective. CM available for any additional needs. Walter Interiano MSW Care Manager

## 2020-08-26 NOTE — PROGRESS NOTES
Progress Note Patient: Tj Walker MRN: [id number]  SSN: [id number] YOB: 1945  Age: 76 y.o. Sex: female ADMITTED:  2020 to Myles Byers MD  for Right renal mass [N28.89] Tj Walker is 2 Days Post-Op Procedure(s): 
ROBOTIC ASSISTED LAPAROSCOPIC RIGHT NEPHRECTOMY. She is doing well. She has no pain. She has no nausea. She is tolerating full liquids. No flatus, abdomen soft, non tender. Indwelling catheter has been removed and patient is voiding well. Hgb 9.7-->8.9  VSS Vitals:  Temp (24hrs), Av °F (36.7 °C), Min:97.3 °F (36.3 °C), Max:99.2 °F (37.3 °C) Blood pressure 140/66, pulse 77, temperature 99.2 °F (37.3 °C), resp. rate 16, height 5' 5.5\" (1.664 m), weight 72.2 kg (159 lb 2.8 oz), SpO2 93 %. I&O's:   1901 -  0700 In: 3692.5 [P.O.:2440; I.V.:1252.5] Out: 3150 [Urine:2600] No intake/output data recorded. Exam:   abdomen soft, appropriately TTP at surgical sites. All incisions clean/dry/intact without evidence of infection. Labs:  
Recent Labs  
  20 
0109 20 
3181 HGB 8.9* 9.7* Recent Labs  
  20 
0109 20 
7662 * 130*  
K 4.2 4.7 CL 97 100 CO2 23 24 * 228* BUN 13 13 CREA 1.12* 1.11* CA 8.2* 8.1* Assessment:  
 
- 2 Days Post-Op Procedure(s): 
ROBOTIC ASSISTED LAPAROSCOPIC RIGHT NEPHRECTOMY Active Problems: 
  Right renal mass (2020) Plan:  
 
-ADAT 
-Zofran PRN for nausea 
-Ambulate 
-Encourage IS use 
-Monitor I&O, bladder scan PRN. Straight cath for PVR >500 mL. -Monitor Hgb 
-Dr. José Herrera' to see pt this afternoon. Signed By: Jeremiah Ruff NP - 2020

## 2020-08-26 NOTE — PROGRESS NOTES
General Surgery End of Shift Nursing Note Shift worked:   Roxanne Hood 135 Summary of shift:    Mainly uneventful. C/O nausea which required PRN medication 2x during shift. No other complaints. Patient is ambulating to restroom with standby assist. Had to bladder scan and had residual but wanted to try and void herself to avoid straight cath. Patient was successful. No PRN pain meds given. Patient was cooperative and tolerating of all nursing related tasks. Issues for physician to address:   None at this time Number times ambulated in hallway past shift: 0, room x4 Number of times OOB to chair past shift: 0 Pain Management: 
Current medication: see MAR Patient states pain is manageable on current pain medication: YES 
 
GI: 
 
Current diet:  DIET NUTRITIONAL SUPPLEMENTS Breakfast, Lunch; Ensure [employer] DIET FULL LIQUID Tolerating current diet: YES Passing flatus: YES Last Bowel Movement: today Respiratory: 
 
Incentive Spirometer at bedside: YES Patient instructed on use: YES Aimee Cabral LPN

## 2020-08-26 NOTE — PROGRESS NOTES
1900--bedside report received from alistair zuluaga pt resting in bed oriented x 4 denies pain, nausea, LPN, cheasla also assigned to pt, call bell in reach assessment as noted 0130--pt requesting bedpan stating \"I don't think I can make it to the bathroom\"  150ml out, post  Void bladder scan, 484, pt assisted up to bathroom voided another 220, repeat bladder scan 280ml, no straight cath at this time, call bell in reach 
 
0700--bedside report given to alistair esteves who is assuming care of pt

## 2020-08-26 NOTE — PROGRESS NOTES
Problem: Diabetes Self-Management Goal: *Disease process and treatment process Description: Define diabetes and identify own type of diabetes; list 3 options for treating diabetes. 8/26/2020 1431 by Santos Olmedo Outcome: Resolved/Met 
8/26/2020 1416 by Santos Olmedo Outcome: Progressing Towards Goal 
Goal: *Incorporating nutritional management into lifestyle Description: Describe effect of type, amount and timing of food on blood glucose; list 3 methods for planning meals. 8/26/2020 1431 by Santos Olmedo Outcome: Resolved/Met 
8/26/2020 1416 by Santos Olmedo Outcome: Progressing Towards Goal 
Goal: *Incorporating physical activity into lifestyle Description: State effect of exercise on blood glucose levels. 8/26/2020 1431 by Santos Olmedo Outcome: Resolved/Met 
8/26/2020 1416 by Santos Olmedo Outcome: Progressing Towards Goal 
Goal: *Developing strategies to promote health/change behavior Description: Define the ABC's of diabetes; identify appropriate screenings, schedule and personal plan for screenings. 8/26/2020 1431 by Santos Olmedo Outcome: Resolved/Met 
8/26/2020 1416 by Santos Olmedo Outcome: Progressing Towards Goal 
Goal: *Using medications safely Description: State effect of diabetes medications on diabetes; name diabetes medication taking, action and side effects. 8/26/2020 1431 by Santos Olmedo Outcome: Resolved/Met 
8/26/2020 1416 by Santos Olmedo Outcome: Progressing Towards Goal 
Goal: *Monitoring blood glucose, interpreting and using results Description: Identify recommended blood glucose targets  and personal targets. 8/26/2020 1431 by Santos Olmedo Outcome: Resolved/Met 
8/26/2020 1416 by Santos Olmedo Outcome: Progressing Towards Goal 
Goal: *Prevention, detection, treatment of acute complications Description: List symptoms of hyper- and hypoglycemia; describe how to treat low blood sugar and actions for lowering  high blood glucose level. 8/26/2020 1431 by Odalys Kruse Outcome: Resolved/Met 
8/26/2020 1416 by Odalys Kruse Outcome: Progressing Towards Goal 
Goal: *Prevention, detection and treatment of chronic complications Description: Define the natural course of diabetes and describe the relationship of blood glucose levels to long term complications of diabetes. 8/26/2020 1431 by Odalys Kruse Outcome: Resolved/Met 
8/26/2020 1416 by Odalys Kruse Outcome: Progressing Towards Goal 
Goal: *Developing strategies to address psychosocial issues Description: Describe feelings about living with diabetes; identify support needed and support network 8/26/2020 1431 by Odalys Kruse Outcome: Resolved/Met 
8/26/2020 1416 by Odalys Kruse Outcome: Progressing Towards Goal 
Goal: *Insulin pump training 8/26/2020 1431 by Odalys Kruse Outcome: Resolved/Met 
8/26/2020 1416 by Odalys Kruse Outcome: Progressing Towards Goal 
Goal: *Sick day guidelines 8/26/2020 1431 by Odalys Kruse Outcome: Resolved/Met 
8/26/2020 1416 by Odalys Kruse Outcome: Progressing Towards Goal 
Goal: *Patient Specific Goal (EDIT GOAL, INSERT TEXT) 
8/26/2020 1431 by Odalys Kruse Outcome: Resolved/Met 
8/26/2020 1416 by Odayls Kruse Outcome: Progressing Towards Goal 
  
Problem: Patient Education: Go to Patient Education Activity Goal: Patient/Family Education 8/26/2020 1431 by Odalys Kruse Outcome: Resolved/Met 
8/26/2020 1416 by Odalys Kruse Outcome: Progressing Towards Goal 
  
Problem: Falls - Risk of 
Goal: *Absence of Falls Description: Document Heather Dear Fall Risk and appropriate interventions in the flowsheet. 8/26/2020 1431 by Odalys Kruse Outcome: Resolved/Met Note: Fall Risk Interventions: 
  
 
  
 
Medication Interventions: Patient to call before getting OOB, Teach patient to arise slowly 8/26/2020 1416 by Kendra Rivas Outcome: Progressing Towards Goal 
Note: Fall Risk Interventions: 
  
 
  
 
Medication Interventions: Patient to call before getting OOB Problem: Patient Education: Go to Patient Education Activity Goal: Patient/Family Education 8/26/2020 1431 by Kendra Rivas Outcome: Resolved/Met 
8/26/2020 1416 by Kendra Rivas Outcome: Progressing Towards Goal

## 2020-08-26 NOTE — PROGRESS NOTES
Patient voided on the bedpan after midnight and I measured in the hat and only had 150 ml. Per doctors orders, I bladder scanned to make sure that the patient isnt retaining urine. The scan showed and estimate volume of 548. Per protocol, I informed patient that I was going to have to straight cath her to get out the excess fluid. When I left room to go grab the needed items to perform the straight cath, patient had asked Abdirahman RN to go on commode and attempt to urinate more. Patient had voided another 300 ml. Performed one more bladder scan and it was estimated to be 284 ml. Abdirahman PAYNE and I were content with what patient voided. Will continue to monitor patients urine output

## 2020-08-26 NOTE — PROGRESS NOTES
Problem: Diabetes Self-Management Goal: *Disease process and treatment process Description: Define diabetes and identify own type of diabetes; list 3 options for treating diabetes. Outcome: Progressing Towards Goal 
Goal: *Incorporating nutritional management into lifestyle Description: Describe effect of type, amount and timing of food on blood glucose; list 3 methods for planning meals. Outcome: Progressing Towards Goal 
Goal: *Incorporating physical activity into lifestyle Description: State effect of exercise on blood glucose levels. Outcome: Progressing Towards Goal 
Goal: *Developing strategies to promote health/change behavior Description: Define the ABC's of diabetes; identify appropriate screenings, schedule and personal plan for screenings. Outcome: Progressing Towards Goal 
Goal: *Using medications safely Description: State effect of diabetes medications on diabetes; name diabetes medication taking, action and side effects. Outcome: Progressing Towards Goal 
Goal: *Monitoring blood glucose, interpreting and using results Description: Identify recommended blood glucose targets  and personal targets. Outcome: Progressing Towards Goal 
Goal: *Prevention, detection, treatment of acute complications Description: List symptoms of hyper- and hypoglycemia; describe how to treat low blood sugar and actions for lowering  high blood glucose level. Outcome: Progressing Towards Goal 
Goal: *Prevention, detection and treatment of chronic complications Description: Define the natural course of diabetes and describe the relationship of blood glucose levels to long term complications of diabetes. Outcome: Progressing Towards Goal 
Goal: *Developing strategies to address psychosocial issues Description: Describe feelings about living with diabetes; identify support needed and support network Outcome: Progressing Towards Goal 
Goal: *Insulin pump training Outcome: Progressing Towards Goal 
 Goal: *Sick day guidelines Outcome: Progressing Towards Goal 
Goal: *Patient Specific Goal (EDIT GOAL, INSERT TEXT) Outcome: Progressing Towards Goal 
  
Problem: Patient Education: Go to Patient Education Activity Goal: Patient/Family Education Outcome: Progressing Towards Goal 
  
Problem: Falls - Risk of 
Goal: *Absence of Falls Description: Document Dina Adrienne Fall Risk and appropriate interventions in the flowsheet. Outcome: Progressing Towards Goal 
Note: Fall Risk Interventions: 
  
 
  
 
Medication Interventions: Patient to call before getting OOB Problem: Patient Education: Go to Patient Education Activity Goal: Patient/Family Education Outcome: Progressing Towards Goal

## 2020-08-27 ENCOUNTER — PATIENT OUTREACH (OUTPATIENT)
Dept: CASE MANAGEMENT | Age: 75
End: 2020-08-27

## 2020-08-27 NOTE — PROGRESS NOTES
Patient was admitted to [facility] on  and discharged on  for R Nephrectomy/Renal Mass . Patient was contacted within 1 business days of discharge. Top Discharge Challenges to be reviewed by the provider Additional needs identified to be addressed with provider yes 
labs- sodium at discharge 128 Patient to follow up OP with Dr Param Torres 20 Patient reports is voiding well---every 2 hours. No blood or abnormalities observed. Discussed COVID-19 related testing which was available at this time. Test results were negative. Patient informed of results, if available? Yes pre op test  
Method of communication with provider : chart routing Advance Care Planning:  
Does patient have an Advance Directive:  reviewed and current Inpatient Readmission Risk score: 10 Was this a readmission? no  
 
Patients top risk factors for readmission: none identified at time of call Interventions to address risk factors:  
 
Care Transition Nurse (CTN) contacted the patient by telephone to perform post hospital discharge assessment. Verified name and  with patient as identifiers. Provided introduction to self, and explanation of the CTN role. CTN reviewed discharge instructions, medical action plan and red flags with patient who verbalized understanding. Patient given an opportunity to ask questions and does not have any further questions or concerns at this time. The patient agrees to contact the PCP office for questions related to their healthcare. Medication reconciliation was performed with patient, who verbalizes understanding of administration of home medications. Advised obtaining a 90-day supply of all daily and as-needed medications. Referral to Pharm D needed: no  
 
Home Health/Outpatient orders at discharge: none Covid Risk Education Patient has following risk factors of: diabetes.  Education provided regarding infection prevention, and signs and symptoms of COVID-19 and when to seek medical attention with patient who verbalized understanding. Discussed exposure protocols and quarantine From CDC: Are you at higher risk for severe illness?  and given an opportunity for questions and concerns. The patient agrees to contact the COVID-19 hotline [phone] or PCP office for questions related to COVID-19. For more information on steps you can take to protect yourself, see CDC's How to Protect Yourself Patient/family/caregiver given information for [employer] and agrees to enroll no Patient's preferred e-mail: declines Patient's preferred phone number: declines Discussed follow-up appointments. If no appointment was previously scheduled, appointment scheduling offered: yes [facility] follow up appointment(s): No future appointments. Non-[facility] follow up appointment(s): Dr José Herrera  9/1/20, PCP pending--pt will contact MD office. Plan for follow-up call in 10-14 days based on severity of symptoms and risk factors. CTN provided contact information for future needs. Goals  Prevent complications post hospitalization. 08/27/20 CTN unable to reach patient on phone, KEM on VM. Will attempt again later today. ---chacorta 08/27/20 Patient returned call, KEM on VM. CTN was able to contact patient to review discharge instructions/red falgs to prevent readmission. Appts: 
 
Dr Russell Gomez has follow up on 9/1 at 1:10pm--pt is aware of appt. PCP--Patient declined CTN offer to make appt, she states she will call MD office today to inform him of procedure and will ask if he wants to see her. CTN instructed patient to ask for recheck of labwork due to low sodium level at discharge. Patient verbalizes understanding. 
 
---patient reports she is taking abx that were ordered for her prior to surgery. She is also taking probiotic and yogurt.  Patient reports she is passing gas, has not had BM yet. Patient state she is aware not to eat anything gassy, greasy or spicy. ---Patient denies any s/sx infection at surgical sites. Reviewed red flags as to when to call MD. 
 
---Patient reports a soreness like a \"puneet horse\" in r upper leg/hip. She states it is not worsening but she has never had this before. Patient denies edema/redness/warmth to leg. CTN informed her that it could be from positioning in OR however if it got worse or did not resolve in a couple of days to call MD to report it. Patient verbalizes understanding. 
 
---Patient states she is ambulating and performing IS as instructed. CTN will follow up with patient in 2 weeks, she has CTN contact information and instructed her to call if needed---chacorta

## 2020-08-28 ENCOUNTER — TELEPHONE (OUTPATIENT)
Dept: INTERNAL MEDICINE CLINIC | Age: 75
End: 2020-08-28

## 2020-08-28 NOTE — TELEPHONE ENCOUNTER
#[phone] pt wanted you to know that she had her kidney removed on 8-24-20 and was discharged on 8-26-20. She was told she didn't need a f/u unless doctor thought he should see her. Message left for return call.

## 2020-09-02 NOTE — TELEPHONE ENCOUNTER
----- Message from Rehan Pérez sent at 9/2/2020  3:45 PM EDT -----  Regarding: Dr. Stan Marquez: [phone]  Caller's first and last name: Bronson Mcneal  Reason for call: Pt has f/up appt set for 12/21/20. She needs coordinating lab appt scheduled. Pt had right kidney removed by Dr. Hal Berry at [facility]. He would like lab to include testing her renal function so that Dr. Irene Eason can review with patient at her f/up appt.   Callback required yes/no and why: yes  Best contact number(s): [phone]  Details to clarify the request: n/a

## 2020-09-03 ENCOUNTER — TELEPHONE (OUTPATIENT)
Dept: INTERNAL MEDICINE CLINIC | Age: 75
End: 2020-09-03

## 2020-09-03 NOTE — TELEPHONE ENCOUNTER
Called, spoke to pt. Two identifiers confirmed. Notified pt labs will be ordered at the time of appointment. Pt verbalized understanding of information discussed w/ no further questions at this time.

## 2020-09-03 NOTE — TELEPHONE ENCOUNTER
----- Message from Milagros Estevez sent at 9/3/2020 11:14 AM EDT -----  Regarding: Dr. Leslie Lomeli / Telephone  Contact: [phone]  Caller's first and last name and relationship (if not the patient): self  Best contact number(s): [phone]  Whose call is being returned: Marielos Cain  Details to clarify the request: Pt is returning call to Marielos Cain, [address] believes call is related to her recent surgery and lab work that needs to be ordered. Pt is available today for a return call.

## 2020-09-30 ENCOUNTER — PATIENT OUTREACH (OUTPATIENT)
Dept: CASE MANAGEMENT | Age: 75
End: 2020-09-30

## 2020-09-30 DIAGNOSIS — I10 ESSENTIAL HYPERTENSION WITH GOAL BLOOD PRESSURE LESS THAN 140/90: ICD-10-CM

## 2020-09-30 NOTE — PROGRESS NOTES
Patient has graduated from the Transitions of Care Coordination  program on 9/30/2020. Patient/family has the ability to self-manage at this time Care management goals have been completed. Patient was not referred to the [facility] team for further management. Goals Addressed This Visit's Progress  COMPLETED: Prevent complications post hospitalization. 08/27/20 CTN unable to reach patient on phone, KEM on VM. Will attempt again later today. ---mkrw 08/27/20 Patient returned call, KEM on VM. CTN was able to contact patient to review discharge instructions/red falgs to prevent readmission. Appts: 
 
Dr Jaya Cruz has follow up on 9/1 at 1:10pm--pt is aware of appt. PCP--Patient declined CTN offer to make appt, she states she will call MD office today to inform him of procedure and will ask if he wants to see her. CTN instructed patient to ask for recheck of labwork due to low sodium level at discharge. Patient verbalizes understanding. 
 
---patient reports she is taking abx that were ordered for her prior to surgery. She is also taking probiotic and yogurt. Patient reports she is passing gas, has not had BM yet. Patient state she is aware not to eat anything gassy, greasy or spicy. ---Patient denies any s/sx infection at surgical sites. Reviewed red flags as to when to call MD. 
 
---Patient reports a soreness like a \"puneet horse\" in r upper leg/hip. She states it is not worsening but she has never had this before. Patient denies edema/redness/warmth to leg. CTN informed her that it could be from positioning in OR however if it got worse or did not resolve in a couple of days to call MD to report it. Patient verbalizes understanding. 
 
---Patient states she is ambulating and performing IS as instructed. CTN will follow up with patient in 2 weeks, she has CTN contact information and instructed her to call if needed---chacorta 09/30/20 CTN spoke with patient to get update and assess need. Patient reports she saw Dr Poncho Reno for follow up on 9/1. She reports all went well at follow up , she has another appt in 3 months. PCP was made aware that patient had nephrectomy, did not need to see pt until December. Patient states she has appt set. Patient reports her recovery has been uncomplicated--she has not had to call Dr Poncho Reno for any reason. Is voiding well without difficulty. Patient reports she feels very fortunate that \"they were able to get the cancer out. \" 
 
CTN instructed patient to call if she has any concerns or needs in the future---mkrw Patient has Care Transition Nurse's contact information for any further questions, concerns, or needs. Patients upcoming visits:   
Future Appointments Date Time Provider Sindi Parekh 12/21/2020  2:15 PM Kylah Gomez III, DO [facility]

## 2020-10-01 RX ORDER — RAMIPRIL 10 MG/1
CAPSULE ORAL
Qty: 180 CAP | Refills: 3 | Status: SHIPPED | OUTPATIENT
Start: 2020-10-01 | End: 2021-01-01
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
## 2020-12-15 NOTE — PATIENT INSTRUCTIONS
Sleep Tips What to avoid:  Do not have drinks with caffeine, such as coffee or black tea, for 8 hours before bed.  Do not smoke or use other types of tobacco near bedtime. Nicotine is a stimulant and can keep you awake.  Avoid drinking alcohol late in the evening, because it can cause you to wake in the middle of the night.  Do not eat a big meal close to bedtime. If you are hungry, eat a light snack.  Do not drink a lot of water close to bedtime, because the need to urinate may wake you up during the night.  Do not read, use your smartphone, tablet etc., or watch TV in bed. Use the bed only for sleeping and sexual activity. What to try:  Go to bed at the same time every night, and wake up at the same time every morning. Do not take naps during the day. \ 
 Keep your bedroom quiet, dark, and cool.  Get regular exercise, but not within 3 to 4 hours of your bedtime.  Sleep on a comfortable pillow and mattress.  If watching the clock makes you anxious, turn it facing away from you so you cannot see the time.  If you worry when you lie down, start a worry book.  Well before bedtime, write down your worries, and then set the book and your concerns aside.  Try meditation or other relaxation techniques before you go to bed.  If you cannot fall asleep, get up and go to another room until you feel sleepy. Do something relaxing. Repeat your bedtime routine before you go to bed again.  Make your house quiet and calm about an hour before bedtime. Turn down the lights, turn off the TV, log off the computer, and turn down the volume on music. This can help you relax after a busy day.
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
## 2020-12-15 NOTE — PROGRESS NOTES
Chief Complaint Patient presents with  Follow-up  
  after concussion, \" I have not been having head pain but I am experiencing some PTSD like symptoms like reactions to loud and sudden noises, anxiety in heavy traffic, no longer like driving [place] the interstate etc.\"  
 
Visit Vitals /82 (BP 1 Location: Right arm, BP Patient Position: Sitting) Pulse 88 Ht 5' 5.5\" (1.664 m) Wt 158 lb (71.7 kg) SpO2 97% BMI 25.89 kg/m²

## 2020-12-16 NOTE — TELEPHONE ENCOUNTER
Contact information provided to pt for Zane Reveles NP with [facility]. Pt verbalized understanding of information discussed w/ no further questions at this time. Referral faxed.

## 2020-12-21 NOTE — PATIENT INSTRUCTIONS
Home Blood Pressure Test: About This Test 
What is it? A home blood pressure test allows you to keep track of your blood pressure at home. Blood pressure is a measure of the force of blood against the walls of your arteries. Blood pressure readings include two numbers, such as 130/80 (say \"130 over 80\"). The first number is the systolic pressure. The second number is the diastolic pressure. Why is this test done? You may do this test at home to: · Find out if you have high blood pressure. · Track your blood pressure if you have high blood pressure. · Track how well medicine is working to reduce high blood pressure. · Check how lifestyle changes, such as weight loss and exercise, are affecting blood pressure. How do you prepare for the test? 
For at least 30 minutes before you take your blood pressure, don't exercise or use caffeine, tobacco, or medicines that raise blood pressure. Take your blood pressure while you feel comfortable and relaxed. Sit quietly with both feet on the floor for at least 5 minutes before the test. 
How is the test done? · Sit with your arm slightly bent and resting on a table so that your upper arm is at the same level as your heart. · Roll up your sleeve or take off your shirt to expose your upper arm. · Wrap the blood pressure cuff around your upper arm so that the lower edge of the cuff is about 1 inch above the bend of your elbow. Proceed with the following steps depending on if you are using an automatic or manual pressure monitor. Automatic blood pressure monitors · Press the on/off button on the automatic monitor and wait until the ready-to-measure \"heart\" symbol appears next to zero in the display window. · Press the start button. The cuff will inflate and deflate by itself. · Your blood pressure numbers will appear on the screen. · Write your numbers in your log book, along with the date and time. Manual blood pressure monitors · Place the earpieces of a stethoscope in your ears, and place the bell of the stethoscope over the artery, just below the cuff. · Close the valve on the rubber inflating bulb. · Squeeze the bulb rapidly with your opposite hand to inflate the cuff until the dial or column of mercury reads about 30 mm Hg higher than your usual systolic pressure. If you do not know your usual pressure, inflate the cuff to 210 mm Hg or until the pulse at your wrist disappears. · Open the pressure valve just slightly by twisting or pressing the valve on the bulb. · As you watch the pressure slowly fall, note the level on the dial at which you first start to hear a pulsing or tapping sound through the stethoscope. This is your systolic blood pressure. · Continue letting the air out slowly. The sounds will become muffled and will finally disappear. Note the pressure when the sounds completely disappear. This is your diastolic blood pressure. Let out all the remaining air. · Write your numbers in your log book, along with the date and time. Follow-up care is a key part of your treatment and safety. Be sure to make and go to all appointments, and call your doctor if you are having problems. It's also a good idea to keep a list of the medicines you take. Where can you learn more? Go to http://enrique-jaspreet.info/ Enter C427 in the search box to learn more about \"Home Blood Pressure Test: About This Test.\" Current as of: December 16, 2019               Content Version: 12.6 © 6827-6217 [employer]. Care instructions adapted under license by [employer] (which disclaims liability or warranty for this information). If you have questions about a medical condition or this instruction, always ask your healthcare professional. [place] [address] any warranty or liability for your use of this information. Continue to follow bp at home. Notify me if consistently above 135/85.
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
## 2021-01-01 ENCOUNTER — PATIENT OUTREACH (OUTPATIENT)
Dept: CASE MANAGEMENT | Age: 76
End: 2021-01-01

## 2021-01-01 ENCOUNTER — TELEPHONE (OUTPATIENT)
Dept: ONCOLOGY | Age: 76
End: 2021-01-01

## 2021-01-01 ENCOUNTER — VIRTUAL VISIT (OUTPATIENT)
Dept: FAMILY MEDICINE CLINIC | Age: 76
End: 2021-01-01

## 2021-01-01 ENCOUNTER — APPOINTMENT (OUTPATIENT)
Dept: NUCLEAR MEDICINE | Age: 76
DRG: 291 | End: 2021-01-01
Attending: EMERGENCY MEDICINE
Payer: MEDICARE

## 2021-01-01 ENCOUNTER — PATIENT MESSAGE (OUTPATIENT)
Dept: INTERNAL MEDICINE CLINIC | Age: 76
End: 2021-01-01

## 2021-01-01 ENCOUNTER — OFFICE VISIT (OUTPATIENT)
Dept: ONCOLOGY | Age: 76
End: 2021-01-01
Payer: MEDICARE

## 2021-01-01 ENCOUNTER — OFFICE VISIT (OUTPATIENT)
Dept: ONCOLOGY | Age: 76
End: 2021-01-01
Payer: COMMERCIAL

## 2021-01-01 ENCOUNTER — DOCUMENTATION ONLY (OUTPATIENT)
Dept: ONCOLOGY | Age: 76
End: 2021-01-01

## 2021-01-01 ENCOUNTER — HOME HEALTH ADMISSION (OUTPATIENT)
Dept: HOME HEALTH SERVICES | Facility: HOME HEALTH | Age: 76
End: 2021-01-01

## 2021-01-01 ENCOUNTER — HOSPITAL ENCOUNTER (OUTPATIENT)
Dept: INFUSION THERAPY | Age: 76
Discharge: HOME | End: 2021-08-24
Payer: MEDICARE

## 2021-01-01 ENCOUNTER — HOSPITAL ENCOUNTER (OUTPATIENT)
Dept: INFUSION THERAPY | Age: 76
Discharge: HOME | End: 2021-09-14
Payer: MEDICARE

## 2021-01-01 ENCOUNTER — HOSPITAL ENCOUNTER (INPATIENT)
Age: 76
LOS: 3 days | Discharge: HOME | DRG: 291 | End: 2021-06-25
Attending: EMERGENCY MEDICINE | Admitting: INTERNAL MEDICINE
Payer: MEDICARE

## 2021-01-01 ENCOUNTER — HOSPITAL ENCOUNTER (OUTPATIENT)
Dept: INFUSION THERAPY | Age: 76
Discharge: HOME | End: 2021-10-05
Payer: MEDICARE

## 2021-01-01 ENCOUNTER — DOCUMENTATION ONLY (OUTPATIENT)
Dept: INTERNAL MEDICINE CLINIC | Age: 76
End: 2021-01-01

## 2021-01-01 ENCOUNTER — OFFICE VISIT (OUTPATIENT)
Dept: INTERNAL MEDICINE CLINIC | Age: 76
End: 2021-01-01
Payer: MEDICARE

## 2021-01-01 ENCOUNTER — HOSPITAL ENCOUNTER (OUTPATIENT)
Dept: ULTRASOUND IMAGING | Age: 76
Discharge: HOME | End: 2021-07-06
Attending: STUDENT IN AN ORGANIZED HEALTH CARE EDUCATION/TRAINING PROGRAM
Payer: MEDICARE

## 2021-01-01 ENCOUNTER — HOSPITAL ENCOUNTER (OUTPATIENT)
Dept: INFUSION THERAPY | Age: 76
Discharge: HOME | End: 2021-09-30
Payer: MEDICARE

## 2021-01-01 ENCOUNTER — OFFICE VISIT (OUTPATIENT)
Dept: INTERNAL MEDICINE CLINIC | Age: 76
DRG: 291 | End: 2021-01-01
Payer: MEDICARE

## 2021-01-01 ENCOUNTER — APPOINTMENT (OUTPATIENT)
Dept: GENERAL RADIOLOGY | Age: 76
DRG: 291 | End: 2021-01-01
Attending: EMERGENCY MEDICINE
Payer: MEDICARE

## 2021-01-01 ENCOUNTER — HOSPITAL ENCOUNTER (OUTPATIENT)
Dept: INFUSION THERAPY | Age: 76
Discharge: HOME | End: 2021-10-08
Payer: MEDICARE

## 2021-01-01 ENCOUNTER — APPOINTMENT (OUTPATIENT)
Dept: CT IMAGING | Age: 76
DRG: 291 | End: 2021-01-01
Attending: EMERGENCY MEDICINE
Payer: MEDICARE

## 2021-01-01 ENCOUNTER — TRANSCRIBE ORDER (OUTPATIENT)
Dept: SCHEDULING | Age: 76
End: 2021-01-01

## 2021-01-01 ENCOUNTER — HOSPITAL ENCOUNTER (OUTPATIENT)
Dept: PET IMAGING | Age: 76
Discharge: HOME | End: 2021-07-20
Attending: STUDENT IN AN ORGANIZED HEALTH CARE EDUCATION/TRAINING PROGRAM
Payer: MEDICARE

## 2021-01-01 ENCOUNTER — APPOINTMENT (OUTPATIENT)
Dept: ULTRASOUND IMAGING | Age: 76
DRG: 291 | End: 2021-01-01
Attending: EMERGENCY MEDICINE
Payer: MEDICARE

## 2021-01-01 ENCOUNTER — TELEPHONE (OUTPATIENT)
Dept: INTERNAL MEDICINE CLINIC | Age: 76
End: 2021-01-01

## 2021-01-01 ENCOUNTER — IMMUNIZATION (OUTPATIENT)
Dept: INTERNAL MEDICINE CLINIC | Age: 76
End: 2021-01-01
Payer: MEDICARE

## 2021-01-01 ENCOUNTER — VIRTUAL VISIT (OUTPATIENT)
Dept: INTERNAL MEDICINE CLINIC | Age: 76
End: 2021-01-01
Payer: MEDICARE

## 2021-01-01 ENCOUNTER — TELEPHONE (OUTPATIENT)
Dept: FAMILY MEDICINE CLINIC | Age: 76
End: 2021-01-01

## 2021-01-01 ENCOUNTER — APPOINTMENT (OUTPATIENT)
Dept: GENERAL RADIOLOGY | Age: 76
DRG: 871 | End: 2021-01-01
Attending: STUDENT IN AN ORGANIZED HEALTH CARE EDUCATION/TRAINING PROGRAM
Payer: MEDICARE

## 2021-01-01 ENCOUNTER — APPOINTMENT (OUTPATIENT)
Dept: NON INVASIVE DIAGNOSTICS | Age: 76
DRG: 291 | End: 2021-01-01
Attending: INTERNAL MEDICINE
Payer: MEDICARE

## 2021-01-01 ENCOUNTER — HOSPITAL ENCOUNTER (OUTPATIENT)
Dept: INFUSION THERAPY | Age: 76
End: 2021-01-01

## 2021-01-01 ENCOUNTER — TELEPHONE (OUTPATIENT)
Dept: PALLATIVE CARE | Age: 76
End: 2021-01-01

## 2021-01-01 ENCOUNTER — HOSPITAL ENCOUNTER (INPATIENT)
Age: 76
LOS: 4 days | Discharge: HOME | DRG: 871 | End: 2021-08-18
Attending: STUDENT IN AN ORGANIZED HEALTH CARE EDUCATION/TRAINING PROGRAM | Admitting: INTERNAL MEDICINE
Payer: MEDICARE

## 2021-01-01 ENCOUNTER — HOSPITAL ENCOUNTER (OUTPATIENT)
Dept: INTERVENTIONAL RADIOLOGY/VASCULAR | Age: 76
Discharge: HOME | End: 2021-07-26
Attending: STUDENT IN AN ORGANIZED HEALTH CARE EDUCATION/TRAINING PROGRAM
Payer: MEDICARE

## 2021-01-01 ENCOUNTER — APPOINTMENT (OUTPATIENT)
Dept: GENERAL RADIOLOGY | Age: 76
DRG: 291 | End: 2021-01-01
Attending: STUDENT IN AN ORGANIZED HEALTH CARE EDUCATION/TRAINING PROGRAM
Payer: MEDICARE

## 2021-01-01 ENCOUNTER — APPOINTMENT (OUTPATIENT)
Dept: INFUSION THERAPY | Age: 76
End: 2021-01-01

## 2021-01-01 ENCOUNTER — APPOINTMENT (OUTPATIENT)
Dept: ULTRASOUND IMAGING | Age: 76
DRG: 291 | End: 2021-01-01
Attending: INTERNAL MEDICINE
Payer: MEDICARE

## 2021-01-01 ENCOUNTER — HOME CARE VISIT (OUTPATIENT)
Dept: HOME HEALTH SERVICES | Facility: HOME HEALTH | Age: 76
End: 2021-01-01

## 2021-01-01 ENCOUNTER — APPOINTMENT (OUTPATIENT)
Dept: NON INVASIVE DIAGNOSTICS | Age: 76
DRG: 871 | End: 2021-01-01
Attending: INTERNAL MEDICINE
Payer: MEDICARE

## 2021-01-01 ENCOUNTER — APPOINTMENT (OUTPATIENT)
Dept: MRI IMAGING | Age: 76
DRG: 291 | End: 2021-01-01
Attending: STUDENT IN AN ORGANIZED HEALTH CARE EDUCATION/TRAINING PROGRAM
Payer: MEDICARE

## 2021-01-01 ENCOUNTER — APPOINTMENT (OUTPATIENT)
Dept: CT IMAGING | Age: 76
DRG: 291 | End: 2021-01-01
Attending: INTERNAL MEDICINE
Payer: MEDICARE

## 2021-01-01 ENCOUNTER — APPOINTMENT (OUTPATIENT)
Dept: CT IMAGING | Age: 76
DRG: 871 | End: 2021-01-01
Attending: STUDENT IN AN ORGANIZED HEALTH CARE EDUCATION/TRAINING PROGRAM
Payer: MEDICARE

## 2021-01-01 ENCOUNTER — APPOINTMENT (OUTPATIENT)
Dept: GENERAL RADIOLOGY | Age: 76
DRG: 871 | End: 2021-01-01
Attending: INTERNAL MEDICINE
Payer: MEDICARE

## 2021-01-01 VITALS
DIASTOLIC BLOOD PRESSURE: 73 MMHG | SYSTOLIC BLOOD PRESSURE: 131 MMHG | RESPIRATION RATE: 16 BRPM | HEIGHT: 65 IN | BODY MASS INDEX: 23.99 KG/M2 | TEMPERATURE: 97.4 F | HEART RATE: 114 BPM | WEIGHT: 144 LBS | OXYGEN SATURATION: 98 %

## 2021-01-01 VITALS
BODY MASS INDEX: 26.52 KG/M2 | HEART RATE: 114 BPM | OXYGEN SATURATION: 96 % | DIASTOLIC BLOOD PRESSURE: 79 MMHG | WEIGHT: 159.2 LBS | HEIGHT: 65 IN | RESPIRATION RATE: 18 BRPM | SYSTOLIC BLOOD PRESSURE: 125 MMHG | TEMPERATURE: 97.7 F

## 2021-01-01 VITALS
DIASTOLIC BLOOD PRESSURE: 74 MMHG | HEART RATE: 108 BPM | HEIGHT: 65 IN | SYSTOLIC BLOOD PRESSURE: 113 MMHG | TEMPERATURE: 98.4 F | RESPIRATION RATE: 16 BRPM | OXYGEN SATURATION: 95 % | WEIGHT: 166 LBS | BODY MASS INDEX: 27.66 KG/M2

## 2021-01-01 VITALS
WEIGHT: 177 LBS | SYSTOLIC BLOOD PRESSURE: 135 MMHG | HEIGHT: 66 IN | DIASTOLIC BLOOD PRESSURE: 76 MMHG | RESPIRATION RATE: 14 BRPM | HEART RATE: 108 BPM | OXYGEN SATURATION: 98 % | BODY MASS INDEX: 28.45 KG/M2

## 2021-01-01 VITALS
BODY MASS INDEX: 23.11 KG/M2 | RESPIRATION RATE: 16 BRPM | OXYGEN SATURATION: 100 % | TEMPERATURE: 97.8 F | DIASTOLIC BLOOD PRESSURE: 70 MMHG | SYSTOLIC BLOOD PRESSURE: 113 MMHG | HEIGHT: 65 IN | HEART RATE: 103 BPM | WEIGHT: 138.7 LBS

## 2021-01-01 VITALS — BODY MASS INDEX: 26.66 KG/M2 | HEIGHT: 65 IN | WEIGHT: 160 LBS

## 2021-01-01 VITALS
TEMPERATURE: 98 F | HEART RATE: 114 BPM | HEIGHT: 65 IN | BODY MASS INDEX: 27.32 KG/M2 | OXYGEN SATURATION: 95 % | RESPIRATION RATE: 16 BRPM | WEIGHT: 164 LBS | DIASTOLIC BLOOD PRESSURE: 67 MMHG | SYSTOLIC BLOOD PRESSURE: 125 MMHG

## 2021-01-01 VITALS
BODY MASS INDEX: 24.14 KG/M2 | DIASTOLIC BLOOD PRESSURE: 71 MMHG | OXYGEN SATURATION: 98 % | TEMPERATURE: 97.4 F | RESPIRATION RATE: 16 BRPM | WEIGHT: 144.9 LBS | SYSTOLIC BLOOD PRESSURE: 121 MMHG | HEART RATE: 103 BPM | HEIGHT: 65 IN

## 2021-01-01 VITALS
OXYGEN SATURATION: 100 % | TEMPERATURE: 97.8 F | BODY MASS INDEX: 22.99 KG/M2 | HEART RATE: 113 BPM | HEIGHT: 65 IN | DIASTOLIC BLOOD PRESSURE: 71 MMHG | SYSTOLIC BLOOD PRESSURE: 116 MMHG | WEIGHT: 138 LBS | RESPIRATION RATE: 18 BRPM

## 2021-01-01 VITALS
BODY MASS INDEX: 26.66 KG/M2 | RESPIRATION RATE: 14 BRPM | OXYGEN SATURATION: 100 % | HEART RATE: 106 BPM | HEIGHT: 65 IN | SYSTOLIC BLOOD PRESSURE: 119 MMHG | DIASTOLIC BLOOD PRESSURE: 56 MMHG | TEMPERATURE: 98.2 F | WEIGHT: 160 LBS

## 2021-01-01 VITALS
BODY MASS INDEX: 23.11 KG/M2 | HEART RATE: 120 BPM | DIASTOLIC BLOOD PRESSURE: 78 MMHG | RESPIRATION RATE: 16 BRPM | SYSTOLIC BLOOD PRESSURE: 129 MMHG | WEIGHT: 138.7 LBS | HEIGHT: 65 IN | OXYGEN SATURATION: 95 % | TEMPERATURE: 97.8 F

## 2021-01-01 VITALS
DIASTOLIC BLOOD PRESSURE: 69 MMHG | TEMPERATURE: 96.9 F | HEART RATE: 98 BPM | SYSTOLIC BLOOD PRESSURE: 120 MMHG | OXYGEN SATURATION: 97 %

## 2021-01-01 VITALS
OXYGEN SATURATION: 93 % | HEART RATE: 108 BPM | BODY MASS INDEX: 24.03 KG/M2 | HEIGHT: 65 IN | RESPIRATION RATE: 18 BRPM | DIASTOLIC BLOOD PRESSURE: 67 MMHG | SYSTOLIC BLOOD PRESSURE: 124 MMHG | WEIGHT: 144.2 LBS | TEMPERATURE: 98.4 F

## 2021-01-01 VITALS
WEIGHT: 154 LBS | HEART RATE: 118 BPM | BODY MASS INDEX: 25.66 KG/M2 | HEIGHT: 65 IN | DIASTOLIC BLOOD PRESSURE: 78 MMHG | SYSTOLIC BLOOD PRESSURE: 127 MMHG | OXYGEN SATURATION: 95 % | TEMPERATURE: 98.4 F

## 2021-01-01 VITALS
HEART RATE: 123 BPM | RESPIRATION RATE: 18 BRPM | TEMPERATURE: 97.4 F | DIASTOLIC BLOOD PRESSURE: 69 MMHG | SYSTOLIC BLOOD PRESSURE: 110 MMHG

## 2021-01-01 VITALS
RESPIRATION RATE: 20 BRPM | DIASTOLIC BLOOD PRESSURE: 53 MMHG | TEMPERATURE: 98.1 F | OXYGEN SATURATION: 94 % | HEART RATE: 101 BPM | WEIGHT: 160 LBS | HEIGHT: 65 IN | BODY MASS INDEX: 26.66 KG/M2 | SYSTOLIC BLOOD PRESSURE: 102 MMHG

## 2021-01-01 VITALS
HEIGHT: 65 IN | TEMPERATURE: 97.8 F | RESPIRATION RATE: 16 BRPM | DIASTOLIC BLOOD PRESSURE: 72 MMHG | WEIGHT: 138.7 LBS | HEART RATE: 108 BPM | SYSTOLIC BLOOD PRESSURE: 108 MMHG | BODY MASS INDEX: 23.11 KG/M2 | OXYGEN SATURATION: 95 %

## 2021-01-01 DIAGNOSIS — K76.9 LIVER LESION: Primary | ICD-10-CM

## 2021-01-01 DIAGNOSIS — C78.7 LIVER METASTASES (HCC): ICD-10-CM

## 2021-01-01 DIAGNOSIS — C79.9 METASTASIS FROM GASTRIC CANCER (HCC): Primary | ICD-10-CM

## 2021-01-01 DIAGNOSIS — E11.9 TYPE 2 DIABETES MELLITUS WITHOUT COMPLICATION, WITHOUT LONG-TERM CURRENT USE OF INSULIN (HCC): ICD-10-CM

## 2021-01-01 DIAGNOSIS — C16.9 METASTASIS FROM GASTRIC CANCER (HCC): Primary | ICD-10-CM

## 2021-01-01 DIAGNOSIS — C16.2 MALIGNANT NEOPLASM OF BODY OF STOMACH (HCC): Primary | ICD-10-CM

## 2021-01-01 DIAGNOSIS — I31.39 PERICARDIAL EFFUSION: ICD-10-CM

## 2021-01-01 DIAGNOSIS — C16.9 METASTASIS FROM GASTRIC CANCER (HCC): ICD-10-CM

## 2021-01-01 DIAGNOSIS — C16.2 MALIGNANT NEOPLASM OF BODY OF STOMACH (HCC): ICD-10-CM

## 2021-01-01 DIAGNOSIS — F07.81 POST CONCUSSION SYNDROME: Primary | ICD-10-CM

## 2021-01-01 DIAGNOSIS — R53.1 WEAKNESS: ICD-10-CM

## 2021-01-01 DIAGNOSIS — R06.02 SOB (SHORTNESS OF BREATH): ICD-10-CM

## 2021-01-01 DIAGNOSIS — J90 PLEURAL EFFUSION ON LEFT: Primary | ICD-10-CM

## 2021-01-01 DIAGNOSIS — I31.39 PERICARDIAL EFFUSION: Primary | ICD-10-CM

## 2021-01-01 DIAGNOSIS — N28.89 RIGHT RENAL MASS: Primary | ICD-10-CM

## 2021-01-01 DIAGNOSIS — R09.81 NASAL SINUS CONGESTION: ICD-10-CM

## 2021-01-01 DIAGNOSIS — C22.9 ADENOCARCINOMA DETERMINED BY BIOPSY OF LIVER (HCC): Primary | ICD-10-CM

## 2021-01-01 DIAGNOSIS — R16.0 LIVER MASS: ICD-10-CM

## 2021-01-01 DIAGNOSIS — N28.89 RIGHT RENAL MASS: ICD-10-CM

## 2021-01-01 DIAGNOSIS — Z71.89 ADVANCED DIRECTIVES, COUNSELING/DISCUSSION: ICD-10-CM

## 2021-01-01 DIAGNOSIS — M81.0 POSTMENOPAUSAL BONE LOSS: ICD-10-CM

## 2021-01-01 DIAGNOSIS — E11.69 HYPERLIPIDEMIA ASSOCIATED WITH TYPE 2 DIABETES MELLITUS (HCC): ICD-10-CM

## 2021-01-01 DIAGNOSIS — C22.9 ADENOCARCINOMA DETERMINED BY BIOPSY OF LIVER (HCC): ICD-10-CM

## 2021-01-01 DIAGNOSIS — F43.10 PTSD (POST-TRAUMATIC STRESS DISORDER): Primary | ICD-10-CM

## 2021-01-01 DIAGNOSIS — F43.10 PTSD (POST-TRAUMATIC STRESS DISORDER): ICD-10-CM

## 2021-01-01 DIAGNOSIS — R53.1 WEAKNESS GENERALIZED: ICD-10-CM

## 2021-01-01 DIAGNOSIS — E87.70 HYPERVOLEMIA, UNSPECIFIED HYPERVOLEMIA TYPE: ICD-10-CM

## 2021-01-01 DIAGNOSIS — R54 AGE-RELATED PHYSICAL DEBILITY: ICD-10-CM

## 2021-01-01 DIAGNOSIS — I10 ESSENTIAL HYPERTENSION: ICD-10-CM

## 2021-01-01 DIAGNOSIS — Z91.81 AT RISK FOR FALLS: Primary | ICD-10-CM

## 2021-01-01 DIAGNOSIS — C64.1 RENAL CELL CARCINOMA OF RIGHT KIDNEY (HCC): ICD-10-CM

## 2021-01-01 DIAGNOSIS — C79.9 METASTASIS FROM GASTRIC CANCER (HCC): ICD-10-CM

## 2021-01-01 DIAGNOSIS — C16.9 GASTRIC ADENOCARCINOMA (HCC): ICD-10-CM

## 2021-01-01 DIAGNOSIS — R06.01 ORTHOPNEA: ICD-10-CM

## 2021-01-01 DIAGNOSIS — R60.0 BILATERAL LEG EDEMA: ICD-10-CM

## 2021-01-01 DIAGNOSIS — R22.0 FACIAL SWELLING: Primary | ICD-10-CM

## 2021-01-01 DIAGNOSIS — Z23 ENCOUNTER FOR IMMUNIZATION: Primary | ICD-10-CM

## 2021-01-01 DIAGNOSIS — Z71.89 DNR (DO NOT RESUSCITATE) DISCUSSION: ICD-10-CM

## 2021-01-01 DIAGNOSIS — R63.0 POOR APPETITE: ICD-10-CM

## 2021-01-01 DIAGNOSIS — J18.9 HCAP (HEALTHCARE-ASSOCIATED PNEUMONIA): Primary | ICD-10-CM

## 2021-01-01 DIAGNOSIS — E78.5 HYPERLIPIDEMIA ASSOCIATED WITH TYPE 2 DIABETES MELLITUS (HCC): ICD-10-CM

## 2021-01-01 DIAGNOSIS — J90 PLEURAL EFFUSION, LEFT: ICD-10-CM

## 2021-01-01 DIAGNOSIS — Z71.89 GOALS OF CARE, COUNSELING/DISCUSSION: ICD-10-CM

## 2021-01-01 DIAGNOSIS — J01.90 ACUTE SINUSITIS, RECURRENCE NOT SPECIFIED, UNSPECIFIED LOCATION: Primary | ICD-10-CM

## 2021-01-01 DIAGNOSIS — J90 PLEURAL EFFUSION: ICD-10-CM

## 2021-01-01 DIAGNOSIS — R11.0 NAUSEA: Primary | ICD-10-CM

## 2021-01-01 DIAGNOSIS — C25.7 MALIGNANT NEOPLASM OF OTHER PARTS OF PANCREAS (HCC): ICD-10-CM

## 2021-01-01 DIAGNOSIS — E11.22 TYPE 2 DIABETES MELLITUS WITH DIABETIC CHRONIC KIDNEY DISEASE, UNSPECIFIED CKD STAGE, UNSPECIFIED WHETHER LONG TERM INSULIN USE (HCC): ICD-10-CM

## 2021-01-01 DIAGNOSIS — I31.39 PERICARDIAL EFFUSION WITHOUT CARDIAC TAMPONADE: ICD-10-CM

## 2021-01-01 DIAGNOSIS — Z00.00 MEDICARE ANNUAL WELLNESS VISIT, SUBSEQUENT: Primary | ICD-10-CM

## 2021-01-01 DIAGNOSIS — K76.9 LIVER LESION: ICD-10-CM

## 2021-01-01 DIAGNOSIS — C78.80 SECONDARY MALIGNANT NEOPLASM OF UNSPECIFIED DIGESTIVE ORGAN (HCC): ICD-10-CM

## 2021-01-01 LAB
ALBUMIN SERPL-MCNC: 1.3 G/DL (ref 3.5–5)
ALBUMIN SERPL-MCNC: 1.4 G/DL (ref 3.5–5)
ALBUMIN SERPL-MCNC: 1.5 G/DL (ref 3.5–5)
ALBUMIN SERPL-MCNC: 1.8 G/DL (ref 3.5–5)
ALBUMIN SERPL-MCNC: 1.8 G/DL (ref 3.5–5)
ALBUMIN SERPL-MCNC: 2.1 G/DL (ref 3.5–5)
ALBUMIN SERPL-MCNC: 2.2 G/DL (ref 3.5–5)
ALBUMIN SERPL-MCNC: 2.2 G/DL (ref 3.5–5)
ALBUMIN SERPL-MCNC: 2.6 G/DL (ref 3.5–5)
ALBUMIN SERPL-MCNC: 2.7 G/DL (ref 3.5–5)
ALBUMIN/GLOB SERPL: 0.4 {RATIO} (ref 1.1–2.2)
ALBUMIN/GLOB SERPL: 0.5 {RATIO} (ref 1.1–2.2)
ALBUMIN/GLOB SERPL: 0.6 {RATIO} (ref 1.1–2.2)
ALBUMIN/GLOB SERPL: 0.7 {RATIO} (ref 1.1–2.2)
ALBUMIN/GLOB SERPL: 0.8 {RATIO} (ref 1.1–2.2)
ALBUMIN/GLOB SERPL: 0.8 {RATIO} (ref 1.1–2.2)
ALP SERPL-CCNC: 159 U/L (ref 45–117)
ALP SERPL-CCNC: 168 U/L (ref 45–117)
ALP SERPL-CCNC: 193 U/L (ref 45–117)
ALP SERPL-CCNC: 203 U/L (ref 45–117)
ALP SERPL-CCNC: 208 U/L (ref 45–117)
ALP SERPL-CCNC: 233 U/L (ref 45–117)
ALP SERPL-CCNC: 292 U/L (ref 45–117)
ALP SERPL-CCNC: 315 U/L (ref 45–117)
ALP SERPL-CCNC: 404 U/L (ref 45–117)
ALP SERPL-CCNC: 476 U/L (ref 45–117)
ALT SERPL-CCNC: 18 U/L (ref 12–78)
ALT SERPL-CCNC: 22 U/L (ref 12–78)
ALT SERPL-CCNC: 23 U/L (ref 12–78)
ALT SERPL-CCNC: 28 U/L (ref 12–78)
ALT SERPL-CCNC: 30 U/L (ref 12–78)
ALT SERPL-CCNC: 30 U/L (ref 12–78)
ALT SERPL-CCNC: 60 U/L (ref 12–78)
ALT SERPL-CCNC: 72 U/L (ref 12–78)
ANION GAP SERPL CALC-SCNC: 10 MMOL/L (ref 5–15)
ANION GAP SERPL CALC-SCNC: 6 MMOL/L (ref 5–15)
ANION GAP SERPL CALC-SCNC: 6 MMOL/L (ref 5–15)
ANION GAP SERPL CALC-SCNC: 7 MMOL/L (ref 5–15)
ANION GAP SERPL CALC-SCNC: 8 MMOL/L (ref 5–15)
ANION GAP SERPL CALC-SCNC: 9 MMOL/L (ref 5–15)
APPEARANCE FLD: CLEAR
AST SERPL-CCNC: 105 U/L (ref 15–37)
AST SERPL-CCNC: 137 U/L (ref 15–37)
AST SERPL-CCNC: 29 U/L (ref 15–37)
AST SERPL-CCNC: 30 U/L (ref 15–37)
AST SERPL-CCNC: 32 U/L (ref 15–37)
AST SERPL-CCNC: 32 U/L (ref 15–37)
AST SERPL-CCNC: 34 U/L (ref 15–37)
AST SERPL-CCNC: 36 U/L (ref 15–37)
AST SERPL-CCNC: 37 U/L (ref 15–37)
AST SERPL-CCNC: 54 U/L (ref 15–37)
ATRIAL RATE: 107 BPM
ATRIAL RATE: 116 BPM
BACTERIA SPEC CULT: NORMAL
BASE DEFICIT BLD-SCNC: 0.6 MMOL/L
BASOPHILS # BLD: 0 K/UL (ref 0–0.1)
BASOPHILS # BLD: 0.1 K/UL (ref 0–0.1)
BASOPHILS NFR BLD: 0 % (ref 0–1)
BASOPHILS NFR BLD: 0 % (ref 0–1)
BASOPHILS NFR BLD: 1 % (ref 0–1)
BILIRUB SERPL-MCNC: 0.4 MG/DL (ref 0.2–1)
BILIRUB SERPL-MCNC: 0.5 MG/DL (ref 0.2–1)
BILIRUB SERPL-MCNC: 0.6 MG/DL (ref 0.2–1)
BILIRUB SERPL-MCNC: 0.7 MG/DL (ref 0.2–1)
BNP SERPL-MCNC: 1472 PG/ML
BNP SERPL-MCNC: 1859 PG/ML
BODY FLD TYPE: NORMAL
BUN SERPL-MCNC: 19 MG/DL (ref 6–20)
BUN SERPL-MCNC: 19 MG/DL (ref 6–20)
BUN SERPL-MCNC: 21 MG/DL (ref 6–20)
BUN SERPL-MCNC: 22 MG/DL (ref 6–20)
BUN SERPL-MCNC: 26 MG/DL (ref 6–20)
BUN SERPL-MCNC: 26 MG/DL (ref 6–20)
BUN SERPL-MCNC: 27 MG/DL (ref 6–20)
BUN SERPL-MCNC: 30 MG/DL (ref 6–20)
BUN SERPL-MCNC: 31 MG/DL (ref 6–20)
BUN SERPL-MCNC: 33 MG/DL (ref 6–20)
BUN SERPL-MCNC: 46 MG/DL (ref 6–20)
BUN/CREAT SERPL: 14 (ref 12–20)
BUN/CREAT SERPL: 15 (ref 12–20)
BUN/CREAT SERPL: 15 (ref 12–20)
BUN/CREAT SERPL: 17 (ref 12–20)
BUN/CREAT SERPL: 21 (ref 12–20)
BUN/CREAT SERPL: 22 (ref 12–20)
BUN/CREAT SERPL: 22 (ref 12–20)
BUN/CREAT SERPL: 23 (ref 12–20)
BUN/CREAT SERPL: 25 (ref 12–20)
BUN/CREAT SERPL: 34 (ref 12–20)
BUN/CREAT SERPL: 43 (ref 12–20)
CA-I BLD-MCNC: 1.12 MMOL/L (ref 1.12–1.32)
CALCIUM SERPL-MCNC: 7.4 MG/DL (ref 8.5–10.1)
CALCIUM SERPL-MCNC: 7.6 MG/DL (ref 8.5–10.1)
CALCIUM SERPL-MCNC: 7.6 MG/DL (ref 8.5–10.1)
CALCIUM SERPL-MCNC: 7.7 MG/DL (ref 8.5–10.1)
CALCIUM SERPL-MCNC: 7.8 MG/DL (ref 8.5–10.1)
CALCIUM SERPL-MCNC: 7.8 MG/DL (ref 8.5–10.1)
CALCIUM SERPL-MCNC: 7.9 MG/DL (ref 8.5–10.1)
CALCIUM SERPL-MCNC: 8 MG/DL (ref 8.5–10.1)
CALCIUM SERPL-MCNC: 8.2 MG/DL (ref 8.5–10.1)
CALCIUM SERPL-MCNC: 8.3 MG/DL (ref 8.5–10.1)
CALCIUM SERPL-MCNC: 8.4 MG/DL (ref 8.5–10.1)
CALCIUM SERPL-MCNC: 8.4 MG/DL (ref 8.5–10.1)
CALCIUM SERPL-MCNC: 8.5 MG/DL (ref 8.5–10.1)
CALCULATED P AXIS, ECG09: 33 DEGREES
CALCULATED P AXIS, ECG09: 34 DEGREES
CALCULATED R AXIS, ECG10: 10 DEGREES
CALCULATED R AXIS, ECG10: 23 DEGREES
CALCULATED T AXIS, ECG11: 16 DEGREES
CALCULATED T AXIS, ECG11: 34 DEGREES
CHLORIDE BLD-SCNC: 92 MMOL/L (ref 100–108)
CHLORIDE SERPL-SCNC: 100 MMOL/L (ref 97–108)
CHLORIDE SERPL-SCNC: 100 MMOL/L (ref 97–108)
CHLORIDE SERPL-SCNC: 101 MMOL/L (ref 97–108)
CHLORIDE SERPL-SCNC: 103 MMOL/L (ref 97–108)
CHLORIDE SERPL-SCNC: 107 MMOL/L (ref 97–108)
CHLORIDE SERPL-SCNC: 107 MMOL/L (ref 97–108)
CHLORIDE SERPL-SCNC: 113 MMOL/L (ref 97–108)
CHLORIDE SERPL-SCNC: 94 MMOL/L (ref 97–108)
CHLORIDE SERPL-SCNC: 94 MMOL/L (ref 97–108)
CHLORIDE SERPL-SCNC: 95 MMOL/L (ref 97–108)
CHLORIDE SERPL-SCNC: 98 MMOL/L (ref 97–108)
CHLORIDE SERPL-SCNC: 98 MMOL/L (ref 97–108)
CHLORIDE SERPL-SCNC: 99 MMOL/L (ref 97–108)
CHOLEST SERPL-MCNC: 124 MG/DL
CK MB CFR SERPL CALC: 1.1 % (ref 0–2.5)
CK MB SERPL-MCNC: 2.4 NG/ML (ref 5–25)
CK SERPL-CCNC: 165 U/L (ref 26–192)
CK SERPL-CCNC: 228 U/L (ref 26–192)
CO2 BLD-SCNC: 23 MMOL/L (ref 19–24)
CO2 SERPL-SCNC: 21 MMOL/L (ref 21–32)
CO2 SERPL-SCNC: 21 MMOL/L (ref 21–32)
CO2 SERPL-SCNC: 22 MMOL/L (ref 21–32)
CO2 SERPL-SCNC: 23 MMOL/L (ref 21–32)
CO2 SERPL-SCNC: 24 MMOL/L (ref 21–32)
CO2 SERPL-SCNC: 24 MMOL/L (ref 21–32)
CO2 SERPL-SCNC: 25 MMOL/L (ref 21–32)
CO2 SERPL-SCNC: 25 MMOL/L (ref 21–32)
CO2 SERPL-SCNC: 26 MMOL/L (ref 21–32)
CO2 SERPL-SCNC: 27 MMOL/L (ref 21–32)
CO2 SERPL-SCNC: 27 MMOL/L (ref 21–32)
COLOR FLD: YELLOW
COMMENT, HOLDF: NORMAL
COMMENT, HOLDF: NORMAL
COVID-19 RAPID TEST, COVR: NOT DETECTED
CREAT SERPL-MCNC: 0.98 MG/DL (ref 0.55–1.02)
CREAT SERPL-MCNC: 1.04 MG/DL (ref 0.55–1.02)
CREAT SERPL-MCNC: 1.06 MG/DL (ref 0.55–1.02)
CREAT SERPL-MCNC: 1.1 MG/DL (ref 0.55–1.02)
CREAT SERPL-MCNC: 1.11 MG/DL (ref 0.55–1.02)
CREAT SERPL-MCNC: 1.19 MG/DL (ref 0.55–1.02)
CREAT SERPL-MCNC: 1.26 MG/DL (ref 0.55–1.02)
CREAT SERPL-MCNC: 1.26 MG/DL (ref 0.55–1.02)
CREAT SERPL-MCNC: 1.37 MG/DL (ref 0.55–1.02)
CREAT SERPL-MCNC: 1.41 MG/DL (ref 0.55–1.02)
CREAT SERPL-MCNC: 1.41 MG/DL (ref 0.55–1.02)
CREAT SERPL-MCNC: 1.48 MG/DL (ref 0.55–1.02)
CREAT SERPL-MCNC: 1.56 MG/DL (ref 0.55–1.02)
CREAT UR-MCNC: 1.8 MG/DL (ref 0.6–1.3)
CRP SERPL-MCNC: 0.34 MG/DL (ref 0–0.6)
D DIMER PPP FEU-MCNC: 0.84 MG/L FEU (ref 0–0.65)
DATE LAST DOSE: ABNORMAL
DIAGNOSIS, 93000: NORMAL
DIAGNOSIS, 93000: NORMAL
DIFFERENTIAL METHOD BLD: ABNORMAL
ECHO AO ROOT DIAM: 2.46 CM
ECHO AO ROOT DIAM: 2.57 CM
ECHO AV AREA PEAK VELOCITY: 1.44 CM2
ECHO AV AREA PEAK VELOCITY: 2.01 CM2
ECHO AV AREA VTI: 1.43 CM2
ECHO AV AREA VTI: 2.13 CM2
ECHO AV AREA/BSA PEAK VELOCITY: 0.8 CM2/M2
ECHO AV AREA/BSA PEAK VELOCITY: 1.2 CM2/M2
ECHO AV AREA/BSA VTI: 0.8 CM2/M2
ECHO AV AREA/BSA VTI: 1.2 CM2/M2
ECHO AV MEAN GRADIENT: 5.71 MMHG
ECHO AV MEAN GRADIENT: 7.66 MMHG
ECHO AV PEAK GRADIENT: 11.89 MMHG
ECHO AV PEAK GRADIENT: 14.37 MMHG
ECHO AV PEAK VELOCITY: 172.4 CM/S
ECHO AV PEAK VELOCITY: 188.88 CM/S
ECHO AV VTI: 25.7 CM
ECHO AV VTI: 34.02 CM
ECHO EST RA PRESSURE: 10 MMHG
ECHO EST RA PRESSURE: 10 MMHG
ECHO LA AREA 4C: 14.31 CM2
ECHO LA AREA 4C: 17.58 CM2
ECHO LA MAJOR AXIS: 2.48 CM
ECHO LA MAJOR AXIS: 3.47 CM
ECHO LA MINOR AXIS: 1.43 CM
ECHO LA MINOR AXIS: 1.85 CM
ECHO LA VOL 4C: 36.13 ML (ref 22–52)
ECHO LA VOL 4C: 50.58 ML (ref 22–52)
ECHO LA VOLUME INDEX A4C: 19.22 ML/M2 (ref 16–28)
ECHO LA VOLUME INDEX A4C: 29.07 ML/M2 (ref 16–28)
ECHO LV E' LATERAL VELOCITY: 7.34 CM/S
ECHO LV E' LATERAL VELOCITY: 8.8 CM/S
ECHO LV E' SEPTAL VELOCITY: 5.49 CM/S
ECHO LV E' SEPTAL VELOCITY: 8.32 CM/S
ECHO LV EDV A4C: 60.23 ML
ECHO LV EDV INDEX A4C: 32 ML/M2
ECHO LV EJECTION FRACTION A4C: 65 PERCENT
ECHO LV ESV A4C: 21.02 ML
ECHO LV ESV INDEX A4C: 11.2 ML/M2
ECHO LV INTERNAL DIMENSION DIASTOLIC: 3.65 CM (ref 3.9–5.3)
ECHO LV INTERNAL DIMENSION DIASTOLIC: 4.6 CM (ref 3.9–5.3)
ECHO LV INTERNAL DIMENSION SYSTOLIC: 2.38 CM
ECHO LV INTERNAL DIMENSION SYSTOLIC: 2.96 CM
ECHO LV IVSD: 0.69 CM (ref 0.6–0.9)
ECHO LV IVSD: 1.24 CM (ref 0.6–0.9)
ECHO LV MASS 2D: 119.8 G (ref 67–162)
ECHO LV MASS 2D: 136.3 G (ref 67–162)
ECHO LV MASS INDEX 2D: 63.7 G/M2 (ref 43–95)
ECHO LV MASS INDEX 2D: 78.3 G/M2 (ref 43–95)
ECHO LV POSTERIOR WALL DIASTOLIC: 0.93 CM (ref 0.6–0.9)
ECHO LV POSTERIOR WALL DIASTOLIC: 1.07 CM (ref 0.6–0.9)
ECHO LVOT CARDIAC OUTPUT: 4.85 LITER/MINUTE
ECHO LVOT CARDIAC OUTPUT: 5.76 LITER/MINUTE
ECHO LVOT DIAM: 1.54 CM
ECHO LVOT DIAM: 1.76 CM
ECHO LVOT PEAK GRADIENT: 8.15 MMHG
ECHO LVOT PEAK GRADIENT: 8.56 MMHG
ECHO LVOT PEAK VELOCITY: 142.78 CM/S
ECHO LVOT PEAK VELOCITY: 146.26 CM/S
ECHO LVOT SV: 48.6 ML
ECHO LVOT SV: 54.8 ML
ECHO LVOT VTI: 22.65 CM
ECHO LVOT VTI: 26.05 CM
ECHO MV A VELOCITY: 119.66 CM/S
ECHO MV A VELOCITY: 139.49 CM/S
ECHO MV AREA PHT: 10.06 CM2
ECHO MV AREA PHT: 9.73 CM2
ECHO MV AREA VTI: 2.13 CM2
ECHO MV AREA VTI: 3.41 CM2
ECHO MV E DECELERATION TIME (DT): 122.28 MS
ECHO MV E DECELERATION TIME (DT): 144.98 MS
ECHO MV E VELOCITY: 118.9 CM/S
ECHO MV E VELOCITY: 70.45 CM/S
ECHO MV E/A RATIO: 0.59
ECHO MV E/A RATIO: 0.85
ECHO MV E/E' LATERAL: 13.51
ECHO MV E/E' LATERAL: 9.6
ECHO MV E/E' RATIO (AVERAGED): 11.22
ECHO MV E/E' RATIO (AVERAGED): 13.9
ECHO MV E/E' SEPTAL: 12.83
ECHO MV E/E' SEPTAL: 14.29
ECHO MV MAX VELOCITY: 135 CM/S
ECHO MV MAX VELOCITY: 142.9 CM/S
ECHO MV MEAN GRADIENT: 2.61 MMHG
ECHO MV MEAN GRADIENT: 3.94 MMHG
ECHO MV PEAK GRADIENT: 7.29 MMHG
ECHO MV PEAK GRADIENT: 8.17 MMHG
ECHO MV PRESSURE HALF TIME (PHT): 21.88 MS
ECHO MV PRESSURE HALF TIME (PHT): 22.62 MS
ECHO MV VTI: 16.07 CM
ECHO MV VTI: 22.85 CM
ECHO PV MAX VELOCITY: 87.56 CM/S
ECHO PV MAX VELOCITY: 98.01 CM/S
ECHO PV MEAN GRADIENT: 1.92 MMHG
ECHO PV PEAK INSTANTANEOUS GRADIENT SYSTOLIC: 3.07 MMHG
ECHO PV PEAK INSTANTANEOUS GRADIENT SYSTOLIC: 3.84 MMHG
ECHO PV REGURGITANT MAX VELOCITY: 143.32 CM/S
ECHO PV VTI: 16.45 CM
ECHO RIGHT VENTRICULAR SYSTOLIC PRESSURE (RVSP): 36.57 MMHG
ECHO RIGHT VENTRICULAR SYSTOLIC PRESSURE (RVSP): 41.84 MMHG
ECHO RV TAPSE: 1.98 CM (ref 1.5–2)
ECHO TV REGURGITANT MAX VELOCITY: 257.71 CM/S
ECHO TV REGURGITANT MAX VELOCITY: 280.9 CM/S
ECHO TV REGURGITANT MAX VELOCITY: 409.77 CM/S
ECHO TV REGURGITANT MAX VELOCITY: 516.58 CM/S
ECHO TV REGURGITANT PEAK GRADIENT: 26.57 MMHG
ECHO TV REGURGITANT PEAK GRADIENT: 31.84 MMHG
EOSINOPHIL # BLD: 0 K/UL (ref 0–0.4)
EOSINOPHIL # BLD: 0.1 K/UL (ref 0–0.4)
EOSINOPHIL # BLD: 0.2 K/UL (ref 0–0.4)
EOSINOPHIL # BLD: 0.3 K/UL (ref 0–0.4)
EOSINOPHIL # BLD: 0.3 K/UL (ref 0–0.4)
EOSINOPHIL # BLD: 0.4 K/UL (ref 0–0.4)
EOSINOPHIL NFR BLD: 0 % (ref 0–7)
EOSINOPHIL NFR BLD: 0 % (ref 0–7)
EOSINOPHIL NFR BLD: 1 % (ref 0–7)
EOSINOPHIL NFR BLD: 2 % (ref 0–7)
EOSINOPHIL NFR BLD: 3 % (ref 0–7)
EOSINOPHIL NFR BLD: 4 % (ref 0–7)
EOSINOPHIL NFR BLD: 4 % (ref 0–7)
ERYTHROCYTE [DISTWIDTH] IN BLOOD BY AUTOMATED COUNT: 17.2 % (ref 11.5–14.5)
ERYTHROCYTE [DISTWIDTH] IN BLOOD BY AUTOMATED COUNT: 17.5 % (ref 11.5–14.5)
ERYTHROCYTE [DISTWIDTH] IN BLOOD BY AUTOMATED COUNT: 17.6 % (ref 11.5–14.5)
ERYTHROCYTE [DISTWIDTH] IN BLOOD BY AUTOMATED COUNT: 17.6 % (ref 11.5–14.5)
ERYTHROCYTE [DISTWIDTH] IN BLOOD BY AUTOMATED COUNT: 17.7 % (ref 11.5–14.5)
ERYTHROCYTE [DISTWIDTH] IN BLOOD BY AUTOMATED COUNT: 17.8 % (ref 11.5–14.5)
ERYTHROCYTE [DISTWIDTH] IN BLOOD BY AUTOMATED COUNT: 18.1 % (ref 11.5–14.5)
ERYTHROCYTE [DISTWIDTH] IN BLOOD BY AUTOMATED COUNT: 18.2 % (ref 11.5–14.5)
ERYTHROCYTE [DISTWIDTH] IN BLOOD BY AUTOMATED COUNT: 18.2 % (ref 11.5–14.5)
ERYTHROCYTE [DISTWIDTH] IN BLOOD BY AUTOMATED COUNT: 18.6 % (ref 11.5–14.5)
ERYTHROCYTE [DISTWIDTH] IN BLOOD BY AUTOMATED COUNT: 19.9 % (ref 11.5–14.5)
ERYTHROCYTE [DISTWIDTH] IN BLOOD BY AUTOMATED COUNT: 21.7 % (ref 11.5–14.5)
ERYTHROCYTE [DISTWIDTH] IN BLOOD BY AUTOMATED COUNT: 22.9 % (ref 11.5–14.5)
ERYTHROCYTE [SEDIMENTATION RATE] IN BLOOD: 10 MM/HR (ref 0–30)
EST. AVERAGE GLUCOSE BLD GHB EST-MCNC: 148 MG/DL
GLOBULIN SER CALC-MCNC: 3.2 G/DL (ref 2–4)
GLOBULIN SER CALC-MCNC: 3.3 G/DL (ref 2–4)
GLOBULIN SER CALC-MCNC: 3.3 G/DL (ref 2–4)
GLOBULIN SER CALC-MCNC: 3.4 G/DL (ref 2–4)
GLOBULIN SER CALC-MCNC: 3.6 G/DL (ref 2–4)
GLOBULIN SER CALC-MCNC: 4.2 G/DL (ref 2–4)
GLOBULIN SER CALC-MCNC: 4.3 G/DL (ref 2–4)
GLOBULIN SER CALC-MCNC: 4.7 G/DL (ref 2–4)
GLUCOSE BLD STRIP.AUTO-MCNC: 111 MG/DL (ref 65–117)
GLUCOSE BLD STRIP.AUTO-MCNC: 114 MG/DL (ref 65–117)
GLUCOSE BLD STRIP.AUTO-MCNC: 119 MG/DL (ref 65–117)
GLUCOSE BLD STRIP.AUTO-MCNC: 123 MG/DL (ref 65–117)
GLUCOSE BLD STRIP.AUTO-MCNC: 124 MG/DL (ref 65–117)
GLUCOSE BLD STRIP.AUTO-MCNC: 125 MG/DL (ref 65–117)
GLUCOSE BLD STRIP.AUTO-MCNC: 126 MG/DL (ref 65–117)
GLUCOSE BLD STRIP.AUTO-MCNC: 127 MG/DL (ref 65–117)
GLUCOSE BLD STRIP.AUTO-MCNC: 129 MG/DL (ref 65–117)
GLUCOSE BLD STRIP.AUTO-MCNC: 130 MG/DL (ref 65–117)
GLUCOSE BLD STRIP.AUTO-MCNC: 130 MG/DL (ref 65–117)
GLUCOSE BLD STRIP.AUTO-MCNC: 135 MG/DL (ref 65–117)
GLUCOSE BLD STRIP.AUTO-MCNC: 135 MG/DL (ref 65–117)
GLUCOSE BLD STRIP.AUTO-MCNC: 138 MG/DL (ref 65–117)
GLUCOSE BLD STRIP.AUTO-MCNC: 138 MG/DL (ref 65–117)
GLUCOSE BLD STRIP.AUTO-MCNC: 142 MG/DL (ref 65–117)
GLUCOSE BLD STRIP.AUTO-MCNC: 150 MG/DL (ref 65–117)
GLUCOSE BLD STRIP.AUTO-MCNC: 150 MG/DL (ref 65–117)
GLUCOSE BLD STRIP.AUTO-MCNC: 151 MG/DL (ref 65–117)
GLUCOSE BLD STRIP.AUTO-MCNC: 151 MG/DL (ref 74–106)
GLUCOSE BLD STRIP.AUTO-MCNC: 153 MG/DL (ref 65–117)
GLUCOSE BLD STRIP.AUTO-MCNC: 153 MG/DL (ref 65–117)
GLUCOSE BLD STRIP.AUTO-MCNC: 157 MG/DL (ref 65–117)
GLUCOSE BLD STRIP.AUTO-MCNC: 167 MG/DL (ref 65–117)
GLUCOSE BLD STRIP.AUTO-MCNC: 171 MG/DL (ref 65–117)
GLUCOSE BLD STRIP.AUTO-MCNC: 176 MG/DL (ref 65–117)
GLUCOSE BLD STRIP.AUTO-MCNC: 176 MG/DL (ref 65–117)
GLUCOSE BLD STRIP.AUTO-MCNC: 90 MG/DL (ref 65–117)
GLUCOSE FLD-MCNC: 156 MG/DL
GLUCOSE SERPL-MCNC: 108 MG/DL (ref 65–100)
GLUCOSE SERPL-MCNC: 113 MG/DL (ref 65–100)
GLUCOSE SERPL-MCNC: 114 MG/DL (ref 65–100)
GLUCOSE SERPL-MCNC: 119 MG/DL (ref 65–100)
GLUCOSE SERPL-MCNC: 142 MG/DL (ref 65–100)
GLUCOSE SERPL-MCNC: 143 MG/DL (ref 65–100)
GLUCOSE SERPL-MCNC: 146 MG/DL (ref 65–100)
GLUCOSE SERPL-MCNC: 152 MG/DL (ref 65–100)
GLUCOSE SERPL-MCNC: 156 MG/DL (ref 65–100)
GLUCOSE SERPL-MCNC: 159 MG/DL (ref 65–100)
GLUCOSE SERPL-MCNC: 159 MG/DL (ref 65–100)
GLUCOSE SERPL-MCNC: 164 MG/DL (ref 65–100)
GLUCOSE SERPL-MCNC: 185 MG/DL (ref 65–100)
GRAM STN SPEC: NORMAL
GRAM STN SPEC: NORMAL
HAV IGM SER QL: NONREACTIVE
HBA1C MFR BLD: 6.8 % (ref 4–5.6)
HBV CORE AB SERPL QL IA: NEGATIVE
HBV CORE IGM SER QL: NONREACTIVE
HBV CORE IGM SERPL QL IA: NEGATIVE
HBV SURFACE AG SER QL: 0.17 INDEX
HBV SURFACE AG SER QL: 0.37 INDEX
HBV SURFACE AG SER QL: NEGATIVE
HBV SURFACE AG SER QL: NEGATIVE
HCO3 BLDA-SCNC: 23 MMOL/L
HCT VFR BLD AUTO: 28.1 % (ref 35–47)
HCT VFR BLD AUTO: 29.5 % (ref 35–47)
HCT VFR BLD AUTO: 30.2 % (ref 35–47)
HCT VFR BLD AUTO: 30.4 % (ref 35–47)
HCT VFR BLD AUTO: 30.6 % (ref 35–47)
HCT VFR BLD AUTO: 30.7 % (ref 35–47)
HCT VFR BLD AUTO: 30.7 % (ref 35–47)
HCT VFR BLD AUTO: 31.2 % (ref 35–47)
HCT VFR BLD AUTO: 31.8 % (ref 35–47)
HCT VFR BLD AUTO: 32.7 % (ref 35–47)
HCT VFR BLD AUTO: 32.8 % (ref 35–47)
HCT VFR BLD AUTO: 33.2 % (ref 35–47)
HCT VFR BLD AUTO: 37.5 % (ref 35–47)
HCV AB SERPL QL IA: NONREACTIVE
HDLC SERPL-MCNC: 33 MG/DL
HDLC SERPL: 3.8 {RATIO} (ref 0–5)
HGB BLD-MCNC: 10.4 G/DL (ref 11.5–16)
HGB BLD-MCNC: 10.6 G/DL (ref 11.5–16)
HGB BLD-MCNC: 11.5 G/DL (ref 11.5–16)
HGB BLD-MCNC: 8.3 G/DL (ref 11.5–16)
HGB BLD-MCNC: 8.6 G/DL (ref 11.5–16)
HGB BLD-MCNC: 9.1 G/DL (ref 11.5–16)
HGB BLD-MCNC: 9.2 G/DL (ref 11.5–16)
HGB BLD-MCNC: 9.2 G/DL (ref 11.5–16)
HGB BLD-MCNC: 9.3 G/DL (ref 11.5–16)
HGB BLD-MCNC: 9.4 G/DL (ref 11.5–16)
HGB BLD-MCNC: 9.6 G/DL (ref 11.5–16)
IMM GRANULOCYTES # BLD AUTO: 0 K/UL (ref 0–0.04)
IMM GRANULOCYTES # BLD AUTO: 0.1 K/UL (ref 0–0.04)
IMM GRANULOCYTES # BLD AUTO: 0.2 K/UL (ref 0–0.04)
IMM GRANULOCYTES NFR BLD AUTO: 0 % (ref 0–0.5)
IMM GRANULOCYTES NFR BLD AUTO: 1 % (ref 0–0.5)
LACTATE BLD-SCNC: 1.04 MMOL/L (ref 0.4–2)
LACTATE SERPL-SCNC: 1.6 MMOL/L (ref 0.4–2)
LACTATE SERPL-SCNC: 2.8 MMOL/L (ref 0.4–2)
LDH FLD L TO P-CCNC: 74 U/L
LDH SERPL L TO P-CCNC: 261 U/L (ref 81–246)
LDLC SERPL CALC-MCNC: 50.6 MG/DL (ref 0–100)
LVOT MG: 3.57 MMHG
LVOT MG: 4.1 MMHG
LYMPHOCYTES # BLD: 1 K/UL (ref 0.8–3.5)
LYMPHOCYTES # BLD: 1.2 K/UL (ref 0.8–3.5)
LYMPHOCYTES # BLD: 1.2 K/UL (ref 0.8–3.5)
LYMPHOCYTES # BLD: 1.4 K/UL (ref 0.8–3.5)
LYMPHOCYTES # BLD: 1.4 K/UL (ref 0.8–3.5)
LYMPHOCYTES # BLD: 1.6 K/UL (ref 0.8–3.5)
LYMPHOCYTES # BLD: 1.6 K/UL (ref 0.8–3.5)
LYMPHOCYTES # BLD: 1.8 K/UL (ref 0.8–3.5)
LYMPHOCYTES # BLD: 1.8 K/UL (ref 0.8–3.5)
LYMPHOCYTES # BLD: 1.9 K/UL (ref 0.8–3.5)
LYMPHOCYTES # BLD: 2 K/UL (ref 0.8–3.5)
LYMPHOCYTES NFR BLD: 11 % (ref 12–49)
LYMPHOCYTES NFR BLD: 11 % (ref 12–49)
LYMPHOCYTES NFR BLD: 13 % (ref 12–49)
LYMPHOCYTES NFR BLD: 14 % (ref 12–49)
LYMPHOCYTES NFR BLD: 14 % (ref 12–49)
LYMPHOCYTES NFR BLD: 15 % (ref 12–49)
LYMPHOCYTES NFR BLD: 15 % (ref 12–49)
LYMPHOCYTES NFR BLD: 17 % (ref 12–49)
LYMPHOCYTES NFR BLD: 21 % (ref 12–49)
LYMPHOCYTES NFR BLD: 26 % (ref 12–49)
LYMPHOCYTES NFR BLD: 8 % (ref 12–49)
LYMPHOCYTES NFR FLD: 75 %
MCH RBC QN AUTO: 23.4 PG (ref 26–34)
MCH RBC QN AUTO: 23.6 PG (ref 26–34)
MCH RBC QN AUTO: 23.7 PG (ref 26–34)
MCH RBC QN AUTO: 23.9 PG (ref 26–34)
MCH RBC QN AUTO: 26.1 PG (ref 26–34)
MCH RBC QN AUTO: 26.3 PG (ref 26–34)
MCH RBC QN AUTO: 26.4 PG (ref 26–34)
MCH RBC QN AUTO: 26.4 PG (ref 26–34)
MCH RBC QN AUTO: 26.6 PG (ref 26–34)
MCH RBC QN AUTO: 26.7 PG (ref 26–34)
MCH RBC QN AUTO: 28.1 PG (ref 26–34)
MCH RBC QN AUTO: 28.3 PG (ref 26–34)
MCH RBC QN AUTO: 28.7 PG (ref 26–34)
MCHC RBC AUTO-ENTMCNC: 28 G/DL (ref 30–36.5)
MCHC RBC AUTO-ENTMCNC: 28.4 G/DL (ref 30–36.5)
MCHC RBC AUTO-ENTMCNC: 29.2 G/DL (ref 30–36.5)
MCHC RBC AUTO-ENTMCNC: 29.5 G/DL (ref 30–36.5)
MCHC RBC AUTO-ENTMCNC: 29.9 G/DL (ref 30–36.5)
MCHC RBC AUTO-ENTMCNC: 30 G/DL (ref 30–36.5)
MCHC RBC AUTO-ENTMCNC: 30.3 G/DL (ref 30–36.5)
MCHC RBC AUTO-ENTMCNC: 30.7 G/DL (ref 30–36.5)
MCHC RBC AUTO-ENTMCNC: 30.7 G/DL (ref 30–36.5)
MCHC RBC AUTO-ENTMCNC: 30.8 G/DL (ref 30–36.5)
MCHC RBC AUTO-ENTMCNC: 30.8 G/DL (ref 30–36.5)
MCHC RBC AUTO-ENTMCNC: 31.9 G/DL (ref 30–36.5)
MCHC RBC AUTO-ENTMCNC: 32.7 G/DL (ref 30–36.5)
MCV RBC AUTO: 81 FL (ref 80–99)
MCV RBC AUTO: 81.3 FL (ref 80–99)
MCV RBC AUTO: 82.4 FL (ref 80–99)
MCV RBC AUTO: 84.1 FL (ref 80–99)
MCV RBC AUTO: 86.2 FL (ref 80–99)
MCV RBC AUTO: 86.4 FL (ref 80–99)
MCV RBC AUTO: 86.8 FL (ref 80–99)
MCV RBC AUTO: 87 FL (ref 80–99)
MCV RBC AUTO: 87.2 FL (ref 80–99)
MCV RBC AUTO: 87.6 FL (ref 80–99)
MCV RBC AUTO: 87.9 FL (ref 80–99)
MCV RBC AUTO: 88.8 FL (ref 80–99)
MCV RBC AUTO: 91.2 FL (ref 80–99)
MESOTHL CELL NFR FLD: 5 %
MONOCYTES # BLD: 0.7 K/UL (ref 0–1)
MONOCYTES # BLD: 0.7 K/UL (ref 0–1)
MONOCYTES # BLD: 0.8 K/UL (ref 0–1)
MONOCYTES # BLD: 0.9 K/UL (ref 0–1)
MONOCYTES # BLD: 1 K/UL (ref 0–1)
MONOCYTES # BLD: 1 K/UL (ref 0–1)
MONOCYTES # BLD: 1.1 K/UL (ref 0–1)
MONOCYTES NFR BLD: 11 % (ref 5–13)
MONOCYTES NFR BLD: 11 % (ref 5–13)
MONOCYTES NFR BLD: 12 % (ref 5–13)
MONOCYTES NFR BLD: 6 % (ref 5–13)
MONOCYTES NFR BLD: 7 % (ref 5–13)
MONOCYTES NFR BLD: 9 % (ref 5–13)
MONOS+MACROS NFR FLD: 19 %
NEUTROPHILS NFR FLD: 1 %
NEUTS SEG # BLD: 10 K/UL (ref 1.8–8)
NEUTS SEG # BLD: 10.3 K/UL (ref 1.8–8)
NEUTS SEG # BLD: 10.5 K/UL (ref 1.8–8)
NEUTS SEG # BLD: 11.7 K/UL (ref 1.8–8)
NEUTS SEG # BLD: 11.8 K/UL (ref 1.8–8)
NEUTS SEG # BLD: 3.9 K/UL (ref 1.8–8)
NEUTS SEG # BLD: 5.4 K/UL (ref 1.8–8)
NEUTS SEG # BLD: 5.5 K/UL (ref 1.8–8)
NEUTS SEG # BLD: 5.6 K/UL (ref 1.8–8)
NEUTS SEG # BLD: 6.3 K/UL (ref 1.8–8)
NEUTS SEG # BLD: 7.3 K/UL (ref 1.8–8)
NEUTS SEG NFR BLD: 57 % (ref 32–75)
NEUTS SEG NFR BLD: 64 % (ref 32–75)
NEUTS SEG NFR BLD: 66 % (ref 32–75)
NEUTS SEG NFR BLD: 72 % (ref 32–75)
NEUTS SEG NFR BLD: 77 % (ref 32–75)
NEUTS SEG NFR BLD: 79 % (ref 32–75)
NEUTS SEG NFR BLD: 84 % (ref 32–75)
NRBC # BLD: 0 K/UL (ref 0–0.01)
NRBC BLD-RTO: 0 PER 100 WBC
NUC CELL # FLD: 555 /CU MM
P-R INTERVAL, ECG05: 118 MS
P-R INTERVAL, ECG05: 130 MS
PCO2 BLDV: 33 MMHG (ref 41–51)
PH BLDV: 7.45 [PH] (ref 7.32–7.42)
PH FLD: 6.8 [PH]
PLATELET # BLD AUTO: 307 K/UL (ref 150–400)
PLATELET # BLD AUTO: 309 K/UL (ref 150–400)
PLATELET # BLD AUTO: 320 K/UL (ref 150–400)
PLATELET # BLD AUTO: 332 K/UL (ref 150–400)
PLATELET # BLD AUTO: 341 K/UL (ref 150–400)
PLATELET # BLD AUTO: 364 K/UL (ref 150–400)
PLATELET # BLD AUTO: 393 K/UL (ref 150–400)
PLATELET # BLD AUTO: 419 K/UL (ref 150–400)
PLATELET # BLD AUTO: 431 K/UL (ref 150–400)
PLATELET # BLD AUTO: 468 K/UL (ref 150–400)
PLATELET # BLD AUTO: 504 K/UL (ref 150–400)
PLATELET # BLD AUTO: 510 K/UL (ref 150–400)
PLATELET # BLD AUTO: 647 K/UL (ref 150–400)
PLATELET COMMENTS,PCOM: ABNORMAL
PMV BLD AUTO: 10.1 FL (ref 8.9–12.9)
PMV BLD AUTO: 10.2 FL (ref 8.9–12.9)
PMV BLD AUTO: 8.9 FL (ref 8.9–12.9)
PMV BLD AUTO: 8.9 FL (ref 8.9–12.9)
PMV BLD AUTO: 9 FL (ref 8.9–12.9)
PMV BLD AUTO: 9.1 FL (ref 8.9–12.9)
PMV BLD AUTO: 9.1 FL (ref 8.9–12.9)
PMV BLD AUTO: 9.2 FL (ref 8.9–12.9)
PMV BLD AUTO: 9.4 FL (ref 8.9–12.9)
PMV BLD AUTO: 9.5 FL (ref 8.9–12.9)
PMV BLD AUTO: 9.8 FL (ref 8.9–12.9)
PO2 BLDV: 30 MMHG (ref 25–40)
POTASSIUM BLD-SCNC: 5.1 MMOL/L (ref 3.5–5.5)
POTASSIUM SERPL-SCNC: 3.6 MMOL/L (ref 3.5–5.1)
POTASSIUM SERPL-SCNC: 4.3 MMOL/L (ref 3.5–5.1)
POTASSIUM SERPL-SCNC: 4.3 MMOL/L (ref 3.5–5.1)
POTASSIUM SERPL-SCNC: 4.4 MMOL/L (ref 3.5–5.1)
POTASSIUM SERPL-SCNC: 4.5 MMOL/L (ref 3.5–5.1)
POTASSIUM SERPL-SCNC: 4.6 MMOL/L (ref 3.5–5.1)
POTASSIUM SERPL-SCNC: 4.6 MMOL/L (ref 3.5–5.1)
POTASSIUM SERPL-SCNC: 4.7 MMOL/L (ref 3.5–5.1)
POTASSIUM SERPL-SCNC: 4.7 MMOL/L (ref 3.5–5.1)
POTASSIUM SERPL-SCNC: 5.3 MMOL/L (ref 3.5–5.1)
POTASSIUM SERPL-SCNC: 5.8 MMOL/L (ref 3.5–5.1)
PROCALCITONIN SERPL-MCNC: <0.05 NG/ML
PROT FLD-MCNC: 1.7 G/DL
PROT SERPL-MCNC: 4.8 G/DL (ref 6.4–8.2)
PROT SERPL-MCNC: 4.9 G/DL (ref 6.4–8.2)
PROT SERPL-MCNC: 5.1 G/DL (ref 6.4–8.2)
PROT SERPL-MCNC: 5.5 G/DL (ref 6.4–8.2)
PROT SERPL-MCNC: 5.6 G/DL (ref 6.4–8.2)
PROT SERPL-MCNC: 5.7 G/DL (ref 6.4–8.2)
PROT SERPL-MCNC: 5.9 G/DL (ref 6.4–8.2)
PROT SERPL-MCNC: 6 G/DL (ref 6.4–8.2)
PROT SERPL-MCNC: 6.1 G/DL (ref 6.4–8.2)
PROT SERPL-MCNC: 6.8 G/DL (ref 6.4–8.2)
Q-T INTERVAL, ECG07: 288 MS
Q-T INTERVAL, ECG07: 320 MS
QRS DURATION, ECG06: 58 MS
QRS DURATION, ECG06: 64 MS
QTC CALCULATION (BEZET), ECG08: 400 MS
QTC CALCULATION (BEZET), ECG08: 427 MS
RBC # BLD AUTO: 3.42 M/UL (ref 3.8–5.2)
RBC # BLD AUTO: 3.46 M/UL (ref 3.8–5.2)
RBC # BLD AUTO: 3.47 M/UL (ref 3.8–5.2)
RBC # BLD AUTO: 3.48 M/UL (ref 3.8–5.2)
RBC # BLD AUTO: 3.52 M/UL (ref 3.8–5.2)
RBC # BLD AUTO: 3.53 M/UL (ref 3.8–5.2)
RBC # BLD AUTO: 3.54 M/UL (ref 3.8–5.2)
RBC # BLD AUTO: 3.63 M/UL (ref 3.8–5.2)
RBC # BLD AUTO: 3.63 M/UL (ref 3.8–5.2)
RBC # BLD AUTO: 3.74 M/UL (ref 3.8–5.2)
RBC # BLD AUTO: 3.9 M/UL (ref 3.8–5.2)
RBC # BLD AUTO: 3.97 M/UL (ref 3.8–5.2)
RBC # BLD AUTO: 4.35 M/UL (ref 3.8–5.2)
RBC # FLD: 75 /CU MM
RBC MORPH BLD: ABNORMAL
REPORTED DOSE,DOSE: ABNORMAL UNITS
REPORTED DOSE/TIME,TMG: ABNORMAL
SAMPLES BEING HELD,HOLD: NORMAL
SAMPLES BEING HELD,HOLD: NORMAL
SARS-COV-2, COV2: NORMAL
SERVICE CMNT-IMP: ABNORMAL
SERVICE CMNT-IMP: NORMAL
SODIUM BLD-SCNC: 128 MMOL/L (ref 136–145)
SODIUM SERPL-SCNC: 128 MMOL/L (ref 136–145)
SODIUM SERPL-SCNC: 129 MMOL/L (ref 136–145)
SODIUM SERPL-SCNC: 130 MMOL/L (ref 136–145)
SODIUM SERPL-SCNC: 132 MMOL/L (ref 136–145)
SODIUM SERPL-SCNC: 132 MMOL/L (ref 136–145)
SODIUM SERPL-SCNC: 134 MMOL/L (ref 136–145)
SODIUM SERPL-SCNC: 136 MMOL/L (ref 136–145)
SODIUM SERPL-SCNC: 142 MMOL/L (ref 136–145)
SOURCE, COVRS: NORMAL
SP1: NORMAL
SP2: NORMAL
SP3: NORMAL
SPECIMEN SITE: ABNORMAL
SPECIMEN SOURCE FLD: ABNORMAL
SPECIMEN SOURCE FLD: NORMAL
TRIGL SERPL-MCNC: 202 MG/DL (ref ?–150)
TROPONIN I SERPL-MCNC: <0.05 NG/ML
TSH SERPL DL<=0.05 MIU/L-ACNC: 2.82 UIU/ML (ref 0.36–3.74)
TSH SERPL DL<=0.05 MIU/L-ACNC: 3.01 UIU/ML (ref 0.36–3.74)
TSH SERPL DL<=0.05 MIU/L-ACNC: 3.53 UIU/ML (ref 0.36–3.74)
TSH SERPL DL<=0.05 MIU/L-ACNC: 3.81 UIU/ML (ref 0.36–3.74)
VANCOMYCIN TROUGH SERPL-MCNC: 12.8 UG/ML (ref 5–10)
VENTRICULAR RATE, ECG03: 107 BPM
VENTRICULAR RATE, ECG03: 116 BPM
VLDLC SERPL CALC-MCNC: 40.4 MG/DL
WBC # BLD AUTO: 10.9 K/UL (ref 3.6–11)
WBC # BLD AUTO: 12 K/UL (ref 3.6–11)
WBC # BLD AUTO: 12.6 K/UL (ref 3.6–11)
WBC # BLD AUTO: 12.6 K/UL (ref 3.6–11)
WBC # BLD AUTO: 13.3 K/UL (ref 3.6–11)
WBC # BLD AUTO: 14.6 K/UL (ref 3.6–11)
WBC # BLD AUTO: 15.1 K/UL (ref 3.6–11)
WBC # BLD AUTO: 7 K/UL (ref 3.6–11)
WBC # BLD AUTO: 7.8 K/UL (ref 3.6–11)
WBC # BLD AUTO: 7.8 K/UL (ref 3.6–11)
WBC # BLD AUTO: 8.5 K/UL (ref 3.6–11)
WBC # BLD AUTO: 9.4 K/UL (ref 3.6–11)
WBC # BLD AUTO: 9.9 K/UL (ref 3.6–11)

## 2021-01-01 PROCEDURE — 65660000001 HC RM ICU INTERMED STEPDOWN

## 2021-01-01 PROCEDURE — 36415 COLL VENOUS BLD VENIPUNCTURE: CPT

## 2021-01-01 PROCEDURE — 83880 ASSAY OF NATRIURETIC PEPTIDE: CPT

## 2021-01-01 PROCEDURE — 99233 SBSQ HOSP IP/OBS HIGH 50: CPT | Performed by: INTERNAL MEDICINE

## 2021-01-01 PROCEDURE — 99222 1ST HOSP IP/OBS MODERATE 55: CPT | Performed by: INTERNAL MEDICINE

## 2021-01-01 PROCEDURE — G0463 HOSPITAL OUTPT CLINIC VISIT: HCPCS | Performed by: INTERNAL MEDICINE

## 2021-01-01 PROCEDURE — 77001 FLUOROGUIDE FOR VEIN DEVICE: CPT

## 2021-01-01 PROCEDURE — 77030010507 HC ADH SKN DERMBND J&J -B

## 2021-01-01 PROCEDURE — 1101F PT FALLS ASSESS-DOCD LE1/YR: CPT | Performed by: NURSE PRACTITIONER

## 2021-01-01 PROCEDURE — 74011250636 HC RX REV CODE- 250/636: Performed by: INTERNAL MEDICINE

## 2021-01-01 PROCEDURE — 88333 PATH CONSLTJ SURG CYTO XM 1: CPT

## 2021-01-01 PROCEDURE — 74011000258 HC RX REV CODE- 258: Performed by: INTERNAL MEDICINE

## 2021-01-01 PROCEDURE — G8427 DOCREV CUR MEDS BY ELIG CLIN: HCPCS | Performed by: INTERNAL MEDICINE

## 2021-01-01 PROCEDURE — 99233 SBSQ HOSP IP/OBS HIGH 50: CPT | Performed by: STUDENT IN AN ORGANIZED HEALTH CARE EDUCATION/TRAINING PROGRAM

## 2021-01-01 PROCEDURE — 88360 TUMOR IMMUNOHISTOCHEM/MANUAL: CPT

## 2021-01-01 PROCEDURE — 1101F PT FALLS ASSESS-DOCD LE1/YR: CPT | Performed by: INTERNAL MEDICINE

## 2021-01-01 PROCEDURE — 96417 CHEMO IV INFUS EACH ADDL SEQ: CPT

## 2021-01-01 PROCEDURE — 70551 MRI BRAIN STEM W/O DYE: CPT

## 2021-01-01 PROCEDURE — G8754 DIAS BP LESS 90: HCPCS | Performed by: STUDENT IN AN ORGANIZED HEALTH CARE EDUCATION/TRAINING PROGRAM

## 2021-01-01 PROCEDURE — G0463 HOSPITAL OUTPT CLINIC VISIT: HCPCS | Performed by: NURSE PRACTITIONER

## 2021-01-01 PROCEDURE — 77030014115 US GUIDE BX LIV PERC

## 2021-01-01 PROCEDURE — 82962 GLUCOSE BLOOD TEST: CPT

## 2021-01-01 PROCEDURE — G8432 DEP SCR NOT DOC, RNG: HCPCS | Performed by: STUDENT IN AN ORGANIZED HEALTH CARE EDUCATION/TRAINING PROGRAM

## 2021-01-01 PROCEDURE — 99441 PR PHYS/QHP TELEPHONE EVALUATION 5-10 MIN: CPT | Performed by: INTERNAL MEDICINE

## 2021-01-01 PROCEDURE — 77030012965 HC NDL HUBR BBMI -A

## 2021-01-01 PROCEDURE — 88112 CYTOPATH CELL ENHANCE TECH: CPT

## 2021-01-01 PROCEDURE — 74011636637 HC RX REV CODE- 636/637: Performed by: INTERNAL MEDICINE

## 2021-01-01 PROCEDURE — G8399 PT W/DXA RESULTS DOCUMENT: HCPCS | Performed by: STUDENT IN AN ORGANIZED HEALTH CARE EDUCATION/TRAINING PROGRAM

## 2021-01-01 PROCEDURE — 84443 ASSAY THYROID STIM HORMONE: CPT

## 2021-01-01 PROCEDURE — 1090F PRES/ABSN URINE INCON ASSESS: CPT | Performed by: STUDENT IN AN ORGANIZED HEALTH CARE EDUCATION/TRAINING PROGRAM

## 2021-01-01 PROCEDURE — 99496 TRANSJ CARE MGMT HIGH F2F 7D: CPT | Performed by: FAMILY MEDICINE

## 2021-01-01 PROCEDURE — 74011250636 HC RX REV CODE- 250/636: Performed by: STUDENT IN AN ORGANIZED HEALTH CARE EDUCATION/TRAINING PROGRAM

## 2021-01-01 PROCEDURE — 99496 TRANSJ CARE MGMT HIGH F2F 7D: CPT | Performed by: INTERNAL MEDICINE

## 2021-01-01 PROCEDURE — 89050 BODY FLUID CELL COUNT: CPT

## 2021-01-01 PROCEDURE — 77030031139 HC SUT VCRL2 J&J -A

## 2021-01-01 PROCEDURE — 96413 CHEMO IV INFUSION 1 HR: CPT

## 2021-01-01 PROCEDURE — 84145 PROCALCITONIN (PCT): CPT

## 2021-01-01 PROCEDURE — 99215 OFFICE O/P EST HI 40 MIN: CPT | Performed by: STUDENT IN AN ORGANIZED HEALTH CARE EDUCATION/TRAINING PROGRAM

## 2021-01-01 PROCEDURE — 87040 BLOOD CULTURE FOR BACTERIA: CPT

## 2021-01-01 PROCEDURE — G8536 NO DOC ELDER MAL SCRN: HCPCS | Performed by: STUDENT IN AN ORGANIZED HEALTH CARE EDUCATION/TRAINING PROGRAM

## 2021-01-01 PROCEDURE — 0W9B3ZZ DRAINAGE OF LEFT PLEURAL CAVITY, PERCUTANEOUS APPROACH: ICD-10-PCS | Performed by: STUDENT IN AN ORGANIZED HEALTH CARE EDUCATION/TRAINING PROGRAM

## 2021-01-01 PROCEDURE — 84295 ASSAY OF SERUM SODIUM: CPT

## 2021-01-01 PROCEDURE — 88307 TISSUE EXAM BY PATHOLOGIST: CPT

## 2021-01-01 PROCEDURE — 80202 ASSAY OF VANCOMYCIN: CPT

## 2021-01-01 PROCEDURE — 96360 HYDRATION IV INFUSION INIT: CPT

## 2021-01-01 PROCEDURE — G8752 SYS BP LESS 140: HCPCS | Performed by: STUDENT IN AN ORGANIZED HEALTH CARE EDUCATION/TRAINING PROGRAM

## 2021-01-01 PROCEDURE — 80053 COMPREHEN METABOLIC PANEL: CPT

## 2021-01-01 PROCEDURE — 2709999900 HC NON-CHARGEABLE SUPPLY

## 2021-01-01 PROCEDURE — 96375 TX/PRO/DX INJ NEW DRUG ADDON: CPT

## 2021-01-01 PROCEDURE — 88342 IMHCHEM/IMCYTCHM 1ST ANTB: CPT

## 2021-01-01 PROCEDURE — G8754 DIAS BP LESS 90: HCPCS | Performed by: NURSE PRACTITIONER

## 2021-01-01 PROCEDURE — 91301 COVID-19, MRNA, LNP-S, PF, 100MCG/0.5ML DOSE(MODERNA): CPT | Performed by: FAMILY MEDICINE

## 2021-01-01 PROCEDURE — 80048 BASIC METABOLIC PNL TOTAL CA: CPT

## 2021-01-01 PROCEDURE — 93308 TTE F-UP OR LMTD: CPT

## 2021-01-01 PROCEDURE — 77030032034 US THORACENTESIS LT NDL W IMAGE

## 2021-01-01 PROCEDURE — 85652 RBC SED RATE AUTOMATED: CPT

## 2021-01-01 PROCEDURE — G8427 DOCREV CUR MEDS BY ELIG CLIN: HCPCS | Performed by: STUDENT IN AN ORGANIZED HEALTH CARE EDUCATION/TRAINING PROGRAM

## 2021-01-01 PROCEDURE — G8427 DOCREV CUR MEDS BY ELIG CLIN: HCPCS | Performed by: NURSE PRACTITIONER

## 2021-01-01 PROCEDURE — 0012A COVID-19, MRNA, LNP-S, PF, 100MCG/0.5ML DOSE(MODERNA): CPT | Performed by: FAMILY MEDICINE

## 2021-01-01 PROCEDURE — 74011250636 HC RX REV CODE- 250/636: Performed by: RADIOLOGY

## 2021-01-01 PROCEDURE — 74177 CT ABD & PELVIS W/CONTRAST: CPT

## 2021-01-01 PROCEDURE — 65660000000 HC RM CCU STEPDOWN

## 2021-01-01 PROCEDURE — 83986 ASSAY PH BODY FLUID NOS: CPT

## 2021-01-01 PROCEDURE — 97116 GAIT TRAINING THERAPY: CPT

## 2021-01-01 PROCEDURE — 71045 X-RAY EXAM CHEST 1 VIEW: CPT

## 2021-01-01 PROCEDURE — 82945 GLUCOSE OTHER FLUID: CPT

## 2021-01-01 PROCEDURE — 85025 COMPLETE CBC W/AUTO DIFF WBC: CPT

## 2021-01-01 PROCEDURE — 82553 CREATINE MB FRACTION: CPT

## 2021-01-01 PROCEDURE — 96365 THER/PROPH/DIAG IV INF INIT: CPT

## 2021-01-01 PROCEDURE — 96374 THER/PROPH/DIAG INJ IV PUSH: CPT

## 2021-01-01 PROCEDURE — 84484 ASSAY OF TROPONIN QUANT: CPT

## 2021-01-01 PROCEDURE — 74011000258 HC RX REV CODE- 258: Performed by: STUDENT IN AN ORGANIZED HEALTH CARE EDUCATION/TRAINING PROGRAM

## 2021-01-01 PROCEDURE — G8419 CALC BMI OUT NRM PARAM NOF/U: HCPCS | Performed by: STUDENT IN AN ORGANIZED HEALTH CARE EDUCATION/TRAINING PROGRAM

## 2021-01-01 PROCEDURE — 1090F PRES/ABSN URINE INCON ASSESS: CPT | Performed by: INTERNAL MEDICINE

## 2021-01-01 PROCEDURE — 74011250637 HC RX REV CODE- 250/637: Performed by: INTERNAL MEDICINE

## 2021-01-01 PROCEDURE — 71250 CT THORAX DX C-: CPT

## 2021-01-01 PROCEDURE — 85027 COMPLETE CBC AUTOMATED: CPT

## 2021-01-01 PROCEDURE — A9552 F18 FDG: HCPCS

## 2021-01-01 PROCEDURE — 99223 1ST HOSP IP/OBS HIGH 75: CPT | Performed by: INTERNAL MEDICINE

## 2021-01-01 PROCEDURE — 74011250636 HC RX REV CODE- 250/636: Performed by: EMERGENCY MEDICINE

## 2021-01-01 PROCEDURE — 97165 OT EVAL LOW COMPLEX 30 MIN: CPT

## 2021-01-01 PROCEDURE — 93306 TTE W/DOPPLER COMPLETE: CPT

## 2021-01-01 PROCEDURE — 83615 LACTATE (LD) (LDH) ENZYME: CPT

## 2021-01-01 PROCEDURE — 74011000636 HC RX REV CODE- 636: Performed by: INTERNAL MEDICINE

## 2021-01-01 PROCEDURE — G8536 NO DOC ELDER MAL SCRN: HCPCS | Performed by: NURSE PRACTITIONER

## 2021-01-01 PROCEDURE — 94762 N-INVAS EAR/PLS OXIMTRY CONT: CPT

## 2021-01-01 PROCEDURE — 93005 ELECTROCARDIOGRAM TRACING: CPT

## 2021-01-01 PROCEDURE — G8420 CALC BMI NORM PARAMETERS: HCPCS | Performed by: NURSE PRACTITIONER

## 2021-01-01 PROCEDURE — G0439 PPPS, SUBSEQ VISIT: HCPCS | Performed by: INTERNAL MEDICINE

## 2021-01-01 PROCEDURE — G8432 DEP SCR NOT DOC, RNG: HCPCS | Performed by: NURSE PRACTITIONER

## 2021-01-01 PROCEDURE — 99214 OFFICE O/P EST MOD 30 MIN: CPT | Performed by: INTERNAL MEDICINE

## 2021-01-01 PROCEDURE — 74011000250 HC RX REV CODE- 250: Performed by: RADIOLOGY

## 2021-01-01 PROCEDURE — 71046 X-RAY EXAM CHEST 2 VIEWS: CPT

## 2021-01-01 PROCEDURE — G8536 NO DOC ELDER MAL SCRN: HCPCS | Performed by: INTERNAL MEDICINE

## 2021-01-01 PROCEDURE — 74011000250 HC RX REV CODE- 250: Performed by: PHYSICIAN ASSISTANT

## 2021-01-01 PROCEDURE — 96415 CHEMO IV INFUSION ADDL HR: CPT

## 2021-01-01 PROCEDURE — 82550 ASSAY OF CK (CPK): CPT

## 2021-01-01 PROCEDURE — G9718 HOSPICE ANYTIME MSMT PER: HCPCS | Performed by: STUDENT IN AN ORGANIZED HEALTH CARE EDUCATION/TRAINING PROGRAM

## 2021-01-01 PROCEDURE — 97535 SELF CARE MNGMENT TRAINING: CPT

## 2021-01-01 PROCEDURE — 1111F DSCHRG MED/CURRENT MED MERGE: CPT | Performed by: NURSE PRACTITIONER

## 2021-01-01 PROCEDURE — 85379 FIBRIN DEGRADATION QUANT: CPT

## 2021-01-01 PROCEDURE — G8427 DOCREV CUR MEDS BY ELIG CLIN: HCPCS | Performed by: FAMILY MEDICINE

## 2021-01-01 PROCEDURE — 1111F DSCHRG MED/CURRENT MED MERGE: CPT | Performed by: STUDENT IN AN ORGANIZED HEALTH CARE EDUCATION/TRAINING PROGRAM

## 2021-01-01 PROCEDURE — 93308 TTE F-UP OR LMTD: CPT | Performed by: INTERNAL MEDICINE

## 2021-01-01 PROCEDURE — 99285 EMERGENCY DEPT VISIT HI MDM: CPT

## 2021-01-01 PROCEDURE — G8754 DIAS BP LESS 90: HCPCS | Performed by: INTERNAL MEDICINE

## 2021-01-01 PROCEDURE — 99215 OFFICE O/P EST HI 40 MIN: CPT | Performed by: NURSE PRACTITIONER

## 2021-01-01 PROCEDURE — G8417 CALC BMI ABV UP PARAM F/U: HCPCS | Performed by: STUDENT IN AN ORGANIZED HEALTH CARE EDUCATION/TRAINING PROGRAM

## 2021-01-01 PROCEDURE — G8419 CALC BMI OUT NRM PARAM NOF/U: HCPCS | Performed by: INTERNAL MEDICINE

## 2021-01-01 PROCEDURE — G8420 CALC BMI NORM PARAMETERS: HCPCS | Performed by: STUDENT IN AN ORGANIZED HEALTH CARE EDUCATION/TRAINING PROGRAM

## 2021-01-01 PROCEDURE — 74011000250 HC RX REV CODE- 250: Performed by: INTERNAL MEDICINE

## 2021-01-01 PROCEDURE — 88305 TISSUE EXAM BY PATHOLOGIST: CPT

## 2021-01-01 PROCEDURE — 83605 ASSAY OF LACTIC ACID: CPT

## 2021-01-01 PROCEDURE — 93306 TTE W/DOPPLER COMPLETE: CPT | Performed by: INTERNAL MEDICINE

## 2021-01-01 PROCEDURE — 84157 ASSAY OF PROTEIN OTHER: CPT

## 2021-01-01 PROCEDURE — 1101F PT FALLS ASSESS-DOCD LE1/YR: CPT | Performed by: STUDENT IN AN ORGANIZED HEALTH CARE EDUCATION/TRAINING PROGRAM

## 2021-01-01 PROCEDURE — C1788 PORT, INDWELLING, IMP: HCPCS

## 2021-01-01 PROCEDURE — 77030040395 HC NDL BIOP COAX INTRO MRTM -B

## 2021-01-01 PROCEDURE — 87340 HEPATITIS B SURFACE AG IA: CPT

## 2021-01-01 PROCEDURE — G8510 SCR DEP NEG, NO PLAN REQD: HCPCS | Performed by: INTERNAL MEDICINE

## 2021-01-01 PROCEDURE — C1892 INTRO/SHEATH,FIXED,PEEL-AWAY: HCPCS

## 2021-01-01 PROCEDURE — G8752 SYS BP LESS 140: HCPCS | Performed by: NURSE PRACTITIONER

## 2021-01-01 PROCEDURE — 87205 SMEAR GRAM STAIN: CPT

## 2021-01-01 PROCEDURE — 0011A COVID-19, MRNA, LNP-S, PF, 100MCG/0.5ML DOSE(MODERNA): CPT | Performed by: FAMILY MEDICINE

## 2021-01-01 PROCEDURE — 86705 HEP B CORE ANTIBODY IGM: CPT

## 2021-01-01 PROCEDURE — G8752 SYS BP LESS 140: HCPCS | Performed by: INTERNAL MEDICINE

## 2021-01-01 PROCEDURE — G9693 PT USE HOSP DURING MSMT PER: HCPCS | Performed by: STUDENT IN AN ORGANIZED HEALTH CARE EDUCATION/TRAINING PROGRAM

## 2021-01-01 PROCEDURE — A9540 TC99M MAA: HCPCS

## 2021-01-01 PROCEDURE — G9740 HOSP SRV TO PT DUR MSMT PER: HCPCS | Performed by: STUDENT IN AN ORGANIZED HEALTH CARE EDUCATION/TRAINING PROGRAM

## 2021-01-01 PROCEDURE — 88341 IMHCHEM/IMCYTCHM EA ADD ANTB: CPT

## 2021-01-01 PROCEDURE — G8399 PT W/DXA RESULTS DOCUMENT: HCPCS | Performed by: NURSE PRACTITIONER

## 2021-01-01 PROCEDURE — G0463 HOSPITAL OUTPT CLINIC VISIT: HCPCS | Performed by: STUDENT IN AN ORGANIZED HEALTH CARE EDUCATION/TRAINING PROGRAM

## 2021-01-01 PROCEDURE — 99223 1ST HOSP IP/OBS HIGH 75: CPT | Performed by: STUDENT IN AN ORGANIZED HEALTH CARE EDUCATION/TRAINING PROGRAM

## 2021-01-01 PROCEDURE — 97161 PT EVAL LOW COMPLEX 20 MIN: CPT

## 2021-01-01 PROCEDURE — 86140 C-REACTIVE PROTEIN: CPT

## 2021-01-01 PROCEDURE — 80074 ACUTE HEPATITIS PANEL: CPT

## 2021-01-01 PROCEDURE — 1090F PRES/ABSN URINE INCON ASSESS: CPT | Performed by: NURSE PRACTITIONER

## 2021-01-01 PROCEDURE — 87635 SARS-COV-2 COVID-19 AMP PRB: CPT

## 2021-01-01 PROCEDURE — 99214 OFFICE O/P EST MOD 30 MIN: CPT | Performed by: STUDENT IN AN ORGANIZED HEALTH CARE EDUCATION/TRAINING PROGRAM

## 2021-01-01 RX ORDER — SODIUM CHLORIDE 9 MG/ML
25 INJECTION, SOLUTION INTRAVENOUS CONTINUOUS
Status: DISCONTINUED | OUTPATIENT
Start: 2021-01-01 | End: 2021-01-01

## 2021-01-01 RX ORDER — DIPHENHYDRAMINE HYDROCHLORIDE 50 MG/ML
50 INJECTION, SOLUTION INTRAMUSCULAR; INTRAVENOUS AS NEEDED
Status: CANCELLED
Start: 2021-01-01

## 2021-01-01 RX ORDER — FLUDEOXYGLUCOSE F-18 200 MCI/ML
10 INJECTION INTRAVENOUS ONCE
Status: COMPLETED | OUTPATIENT
Start: 2021-01-01 | End: 2021-01-01

## 2021-01-01 RX ORDER — SODIUM CHLORIDE 9 MG/ML
10 INJECTION INTRAMUSCULAR; INTRAVENOUS; SUBCUTANEOUS AS NEEDED
Status: DISCONTINUED | OUTPATIENT
Start: 2021-01-01 | End: 2021-01-01 | Stop reason: HOSPADM

## 2021-01-01 RX ORDER — HEPARIN 100 UNIT/ML
300-500 SYRINGE INTRAVENOUS AS NEEDED
Status: CANCELLED
Start: 2021-01-01

## 2021-01-01 RX ORDER — VANCOMYCIN HYDROCHLORIDE
1250
Status: COMPLETED | OUTPATIENT
Start: 2021-01-01 | End: 2021-01-01

## 2021-01-01 RX ORDER — AMOXICILLIN AND CLAVULANATE POTASSIUM 875; 125 MG/1; MG/1
1 TABLET, FILM COATED ORAL EVERY 12 HOURS
Qty: 20 TABLET | Refills: 0 | Status: SHIPPED | OUTPATIENT
Start: 2021-01-01 | End: 2021-01-01

## 2021-01-01 RX ORDER — INSULIN LISPRO 100 [IU]/ML
INJECTION, SOLUTION INTRAVENOUS; SUBCUTANEOUS
Status: DISCONTINUED | OUTPATIENT
Start: 2021-01-01 | End: 2021-01-01 | Stop reason: HOSPADM

## 2021-01-01 RX ORDER — SODIUM CHLORIDE 0.9 % (FLUSH) 0.9 %
10 SYRINGE (ML) INJECTION AS NEEDED
Status: CANCELLED | OUTPATIENT
Start: 2021-01-01 | End: 2021-01-01

## 2021-01-01 RX ORDER — ONDANSETRON 2 MG/ML
4 INJECTION INTRAMUSCULAR; INTRAVENOUS
Status: DISCONTINUED | OUTPATIENT
Start: 2021-01-01 | End: 2021-01-01 | Stop reason: HOSPADM

## 2021-01-01 RX ORDER — SODIUM CHLORIDE 0.9 % (FLUSH) 0.9 %
10 SYRINGE (ML) INJECTION AS NEEDED
Status: CANCELLED | OUTPATIENT
Start: 2021-01-01

## 2021-01-01 RX ORDER — ACETAMINOPHEN 650 MG/1
650 SUPPOSITORY RECTAL
Status: DISCONTINUED | OUTPATIENT
Start: 2021-01-01 | End: 2021-01-01 | Stop reason: HOSPADM

## 2021-01-01 RX ORDER — DEXTROSE MONOHYDRATE 50 MG/ML
25 INJECTION, SOLUTION INTRAVENOUS CONTINUOUS
Status: DISCONTINUED | OUTPATIENT
Start: 2021-01-01 | End: 2021-01-01 | Stop reason: HOSPADM

## 2021-01-01 RX ORDER — AMITRIPTYLINE HYDROCHLORIDE 10 MG/1
10 TABLET, FILM COATED ORAL
Status: DISCONTINUED | OUTPATIENT
Start: 2021-01-01 | End: 2021-01-01 | Stop reason: HOSPADM

## 2021-01-01 RX ORDER — HEPARIN 100 UNIT/ML
300 SYRINGE INTRAVENOUS ONCE
Status: COMPLETED | OUTPATIENT
Start: 2021-01-01 | End: 2021-01-01

## 2021-01-01 RX ORDER — ONDANSETRON 2 MG/ML
8 INJECTION INTRAMUSCULAR; INTRAVENOUS AS NEEDED
Status: CANCELLED | OUTPATIENT
Start: 2021-01-01

## 2021-01-01 RX ORDER — FUROSEMIDE 40 MG/1
40 TABLET ORAL DAILY
Qty: 30 TABLET | Refills: 0 | Status: SHIPPED | OUTPATIENT
Start: 2021-01-01 | End: 2021-01-01 | Stop reason: SDUPTHER

## 2021-01-01 RX ORDER — ACETAMINOPHEN 325 MG/1
650 TABLET ORAL AS NEEDED
Status: CANCELLED
Start: 2021-10-26

## 2021-01-01 RX ORDER — FUROSEMIDE 40 MG/1
40 TABLET ORAL DAILY
Status: DISCONTINUED | OUTPATIENT
Start: 2021-01-01 | End: 2021-01-01 | Stop reason: HOSPADM

## 2021-01-01 RX ORDER — SODIUM CHLORIDE 9 MG/ML
25 INJECTION, SOLUTION INTRAVENOUS CONTINUOUS
Status: DISCONTINUED | OUTPATIENT
Start: 2021-01-01 | End: 2021-01-01 | Stop reason: HOSPADM

## 2021-01-01 RX ORDER — HEPARIN 100 UNIT/ML
300-500 SYRINGE INTRAVENOUS AS NEEDED
Status: DISCONTINUED | OUTPATIENT
Start: 2021-01-01 | End: 2021-01-01 | Stop reason: HOSPADM

## 2021-01-01 RX ORDER — ACETAMINOPHEN 325 MG/1
650 TABLET ORAL
Status: DISCONTINUED | OUTPATIENT
Start: 2021-01-01 | End: 2021-01-01 | Stop reason: HOSPADM

## 2021-01-01 RX ORDER — EPINEPHRINE 1 MG/ML
0.3 INJECTION, SOLUTION, CONCENTRATE INTRAVENOUS AS NEEDED
Status: CANCELLED | OUTPATIENT
Start: 2021-01-01

## 2021-01-01 RX ORDER — SODIUM CHLORIDE 0.9 % (FLUSH) 0.9 %
10 SYRINGE (ML) INJECTION AS NEEDED
Status: DISCONTINUED | OUTPATIENT
Start: 2021-01-01 | End: 2021-01-01 | Stop reason: HOSPADM

## 2021-01-01 RX ORDER — SIMVASTATIN 20 MG/1
TABLET, FILM COATED ORAL
Qty: 90 TABLET | Refills: 3 | Status: SHIPPED | OUTPATIENT
Start: 2021-01-01

## 2021-01-01 RX ORDER — CAPECITABINE 500 MG/1
1500 TABLET, FILM COATED ORAL
Qty: 84 TABLET | Refills: 11 | Status: SHIPPED | OUTPATIENT
Start: 2021-01-01 | End: 2021-01-01 | Stop reason: SDUPTHER

## 2021-01-01 RX ORDER — OXYCODONE HYDROCHLORIDE 5 MG/1
5 TABLET ORAL
Qty: 30 TABLET | Refills: 0 | Status: SHIPPED | OUTPATIENT
Start: 2021-01-01 | End: 2021-01-01

## 2021-01-01 RX ORDER — POLYETHYLENE GLYCOL 3350 17 G/17G
17 POWDER, FOR SOLUTION ORAL DAILY PRN
Status: DISCONTINUED | OUTPATIENT
Start: 2021-01-01 | End: 2021-01-01 | Stop reason: HOSPADM

## 2021-01-01 RX ORDER — ACETAMINOPHEN 325 MG/1
650 TABLET ORAL
Status: DISCONTINUED | OUTPATIENT
Start: 2021-01-01 | End: 2021-01-01 | Stop reason: SDUPTHER

## 2021-01-01 RX ORDER — LIDOCAINE HYDROCHLORIDE 20 MG/ML
18 INJECTION, SOLUTION INFILTRATION; PERINEURAL ONCE
Status: COMPLETED | OUTPATIENT
Start: 2021-01-01 | End: 2021-01-01

## 2021-01-01 RX ORDER — DOXYCYCLINE 100 MG/1
100 TABLET ORAL 2 TIMES DAILY
Qty: 10 TABLET | Refills: 0 | Status: SHIPPED | OUTPATIENT
Start: 2021-01-01 | End: 2021-01-01

## 2021-01-01 RX ORDER — HYDROCORTISONE SODIUM SUCCINATE 100 MG/2ML
100 INJECTION, POWDER, FOR SOLUTION INTRAMUSCULAR; INTRAVENOUS AS NEEDED
Status: CANCELLED | OUTPATIENT
Start: 2021-01-01

## 2021-01-01 RX ORDER — AZITHROMYCIN 500 MG/1
500 TABLET, FILM COATED ORAL DAILY
Qty: 5 TABLET | Refills: 0 | Status: SHIPPED | OUTPATIENT
Start: 2021-01-01 | End: 2021-01-01

## 2021-01-01 RX ORDER — ALBUTEROL SULFATE 0.83 MG/ML
2.5 SOLUTION RESPIRATORY (INHALATION) AS NEEDED
Status: CANCELLED
Start: 2021-01-01

## 2021-01-01 RX ORDER — DEXTROSE MONOHYDRATE 50 MG/ML
25 INJECTION, SOLUTION INTRAVENOUS CONTINUOUS
Status: CANCELLED | OUTPATIENT
Start: 2021-10-26

## 2021-01-01 RX ORDER — MAGNESIUM SULFATE 100 %
4 CRYSTALS MISCELLANEOUS AS NEEDED
Status: DISCONTINUED | OUTPATIENT
Start: 2021-01-01 | End: 2021-01-01 | Stop reason: HOSPADM

## 2021-01-01 RX ORDER — FUROSEMIDE 10 MG/ML
40 INJECTION INTRAMUSCULAR; INTRAVENOUS 2 TIMES DAILY
Status: DISCONTINUED | OUTPATIENT
Start: 2021-01-01 | End: 2021-01-01

## 2021-01-01 RX ORDER — AZELASTINE 1 MG/ML
1 SPRAY, METERED NASAL
Status: DISCONTINUED | OUTPATIENT
Start: 2021-01-01 | End: 2021-01-01 | Stop reason: HOSPADM

## 2021-01-01 RX ORDER — METFORMIN HYDROCHLORIDE 500 MG/1
TABLET ORAL
Qty: 180 TABLET | Refills: 3 | Status: SHIPPED | OUTPATIENT
Start: 2021-01-01

## 2021-01-01 RX ORDER — SODIUM CHLORIDE 0.9 % (FLUSH) 0.9 %
5-40 SYRINGE (ML) INJECTION EVERY 8 HOURS
Status: DISCONTINUED | OUTPATIENT
Start: 2021-01-01 | End: 2021-01-01 | Stop reason: HOSPADM

## 2021-01-01 RX ORDER — SODIUM CHLORIDE 9 MG/ML
25 INJECTION, SOLUTION INTRAVENOUS CONTINUOUS
Status: CANCELLED
Start: 2021-10-26

## 2021-01-01 RX ORDER — EPINEPHRINE 1 MG/ML
0.3 INJECTION, SOLUTION, CONCENTRATE INTRAVENOUS AS NEEDED
Status: CANCELLED | OUTPATIENT
Start: 2021-10-26

## 2021-01-01 RX ORDER — CAPECITABINE 500 MG/1
1500 TABLET, FILM COATED ORAL SEE ADMIN INSTRUCTIONS
Qty: 84 TABLET | Refills: 11 | Status: SHIPPED | OUTPATIENT
Start: 2021-01-01 | End: 2021-01-01

## 2021-01-01 RX ORDER — CAPECITABINE 150 MG/1
300 TABLET, FILM COATED ORAL SEE ADMIN INSTRUCTIONS
Qty: 56 TABLET | Refills: 11 | Status: SHIPPED | OUTPATIENT
Start: 2021-01-01 | End: 2021-01-01

## 2021-01-01 RX ORDER — DEXTROSE MONOHYDRATE 50 MG/ML
25 INJECTION, SOLUTION INTRAVENOUS CONTINUOUS
Status: CANCELLED | OUTPATIENT
Start: 2021-01-01 | End: 2021-01-01

## 2021-01-01 RX ORDER — DEXTROSE 50 % IN WATER (D50W) INTRAVENOUS SYRINGE
12.5-25 AS NEEDED
Status: DISCONTINUED | OUTPATIENT
Start: 2021-01-01 | End: 2021-01-01 | Stop reason: HOSPADM

## 2021-01-01 RX ORDER — AZELASTINE 1 MG/ML
1 SPRAY, METERED NASAL
Qty: 1 BOTTLE | Refills: 1 | Status: ON HOLD | OUTPATIENT
Start: 2021-01-01 | End: 2021-01-01

## 2021-01-01 RX ORDER — GLIPIZIDE 5 MG/1
TABLET ORAL
Qty: 90 TAB | Refills: 3 | Status: SHIPPED | OUTPATIENT
Start: 2021-01-01

## 2021-01-01 RX ORDER — BARIUM SULFATE 20 MG/ML
900 SUSPENSION ORAL
Status: COMPLETED | OUTPATIENT
Start: 2021-01-01 | End: 2021-01-01

## 2021-01-01 RX ORDER — AZELASTINE 1 MG/ML
SPRAY, METERED NASAL
Qty: 1 EACH | Refills: 1 | Status: SHIPPED | OUTPATIENT
Start: 2021-01-01

## 2021-01-01 RX ORDER — HYDROCORTISONE SODIUM SUCCINATE 100 MG/2ML
100 INJECTION, POWDER, FOR SOLUTION INTRAMUSCULAR; INTRAVENOUS AS NEEDED
Status: CANCELLED | OUTPATIENT
Start: 2021-10-26

## 2021-01-01 RX ORDER — SODIUM CHLORIDE 0.9 % (FLUSH) 0.9 %
10 SYRINGE (ML) INJECTION
Status: COMPLETED | OUTPATIENT
Start: 2021-01-01 | End: 2021-01-01

## 2021-01-01 RX ORDER — HEPARIN SODIUM 200 [USP'U]/100ML
400 INJECTION, SOLUTION INTRAVENOUS ONCE
Status: COMPLETED | OUTPATIENT
Start: 2021-01-01 | End: 2021-01-01

## 2021-01-01 RX ORDER — SODIUM CHLORIDE 9 MG/ML
10 INJECTION INTRAMUSCULAR; INTRAVENOUS; SUBCUTANEOUS AS NEEDED
Status: ACTIVE | OUTPATIENT
Start: 2021-01-01 | End: 2021-01-01

## 2021-01-01 RX ORDER — PALONOSETRON 0.05 MG/ML
0.25 INJECTION, SOLUTION INTRAVENOUS ONCE
Status: CANCELLED | OUTPATIENT
Start: 2021-01-01 | End: 2021-01-01

## 2021-01-01 RX ORDER — FUROSEMIDE 40 MG/1
40 TABLET ORAL
Status: DISCONTINUED | OUTPATIENT
Start: 2021-01-01 | End: 2021-01-01

## 2021-01-01 RX ORDER — CAPECITABINE 150 MG/1
300 TABLET, FILM COATED ORAL
Qty: 56 TABLET | Refills: 11 | Status: ON HOLD | OUTPATIENT
Start: 2021-01-01 | End: 2021-01-01 | Stop reason: SDUPTHER

## 2021-01-01 RX ORDER — MIDAZOLAM HYDROCHLORIDE 1 MG/ML
0-10 INJECTION, SOLUTION INTRAMUSCULAR; INTRAVENOUS
Status: DISCONTINUED | OUTPATIENT
Start: 2021-01-01 | End: 2021-01-01

## 2021-01-01 RX ORDER — SODIUM CHLORIDE 9 MG/ML
10 INJECTION INTRAMUSCULAR; INTRAVENOUS; SUBCUTANEOUS AS NEEDED
Status: CANCELLED | OUTPATIENT
Start: 2021-01-01

## 2021-01-01 RX ORDER — SODIUM CHLORIDE 0.9 % (FLUSH) 0.9 %
10 SYRINGE (ML) INJECTION AS NEEDED
Status: CANCELLED | OUTPATIENT
Start: 2021-10-26

## 2021-01-01 RX ORDER — LIDOCAINE AND PRILOCAINE 25; 25 MG/G; MG/G
CREAM TOPICAL AS NEEDED
Qty: 30 G | Refills: 0 | Status: SHIPPED | OUTPATIENT
Start: 2021-01-01

## 2021-01-01 RX ORDER — ONDANSETRON 2 MG/ML
8 INJECTION INTRAMUSCULAR; INTRAVENOUS AS NEEDED
Status: CANCELLED | OUTPATIENT
Start: 2021-10-26

## 2021-01-01 RX ORDER — AMOXICILLIN 250 MG
2 CAPSULE ORAL DAILY
Status: DISCONTINUED | OUTPATIENT
Start: 2021-01-01 | End: 2021-01-01 | Stop reason: HOSPADM

## 2021-01-01 RX ORDER — ATORVASTATIN CALCIUM 20 MG/1
20 TABLET, FILM COATED ORAL DAILY
Status: DISCONTINUED | OUTPATIENT
Start: 2021-01-01 | End: 2021-01-01 | Stop reason: HOSPADM

## 2021-01-01 RX ORDER — PALONOSETRON 0.05 MG/ML
0.25 INJECTION, SOLUTION INTRAVENOUS ONCE
Status: COMPLETED | OUTPATIENT
Start: 2021-01-01 | End: 2021-01-01

## 2021-01-01 RX ORDER — AZELASTINE 1 MG/ML
1 SPRAY, METERED NASAL
Qty: 1 BOTTLE | Refills: 1 | Status: SHIPPED | OUTPATIENT
Start: 2021-01-01 | End: 2021-01-01 | Stop reason: SDUPTHER

## 2021-01-01 RX ORDER — HEPARIN 100 UNIT/ML
300-500 SYRINGE INTRAVENOUS AS NEEDED
Status: CANCELLED
Start: 2021-10-26

## 2021-01-01 RX ORDER — AZELASTINE 1 MG/ML
SPRAY, METERED NASAL
Qty: 1 BOTTLE | Refills: 1 | Status: SHIPPED | OUTPATIENT
Start: 2021-01-01 | End: 2021-01-01

## 2021-01-01 RX ORDER — DIPHENHYDRAMINE HYDROCHLORIDE 50 MG/ML
25 INJECTION, SOLUTION INTRAMUSCULAR; INTRAVENOUS AS NEEDED
Status: CANCELLED
Start: 2021-01-01

## 2021-01-01 RX ORDER — HEPARIN 100 UNIT/ML
300-500 SYRINGE INTRAVENOUS AS NEEDED
Status: ACTIVE | OUTPATIENT
Start: 2021-01-01 | End: 2021-01-01

## 2021-01-01 RX ORDER — MIRTAZAPINE 7.5 MG/1
7.5 TABLET, FILM COATED ORAL
Qty: 30 TABLET | Refills: 0 | Status: SHIPPED | OUTPATIENT
Start: 2021-01-01

## 2021-01-01 RX ORDER — SODIUM CHLORIDE 0.9 % (FLUSH) 0.9 %
5-40 SYRINGE (ML) INJECTION AS NEEDED
Status: DISCONTINUED | OUTPATIENT
Start: 2021-01-01 | End: 2021-01-01 | Stop reason: HOSPADM

## 2021-01-01 RX ORDER — FUROSEMIDE 10 MG/ML
40 INJECTION INTRAMUSCULAR; INTRAVENOUS ONCE
Status: COMPLETED | OUTPATIENT
Start: 2021-01-01 | End: 2021-01-01

## 2021-01-01 RX ORDER — FENTANYL CITRATE 50 UG/ML
100 INJECTION, SOLUTION INTRAMUSCULAR; INTRAVENOUS
Status: DISCONTINUED | OUTPATIENT
Start: 2021-01-01 | End: 2021-01-01 | Stop reason: HOSPADM

## 2021-01-01 RX ORDER — ONDANSETRON 4 MG/1
4 TABLET, ORALLY DISINTEGRATING ORAL
Qty: 40 TABLET | Refills: 1 | Status: SHIPPED | OUTPATIENT
Start: 2021-01-01

## 2021-01-01 RX ORDER — LIDOCAINE HYDROCHLORIDE 10 MG/ML
10 INJECTION, SOLUTION EPIDURAL; INFILTRATION; INTRACAUDAL; PERINEURAL
Status: COMPLETED | OUTPATIENT
Start: 2021-01-01 | End: 2021-01-01

## 2021-01-01 RX ORDER — DRONABINOL 2.5 MG/1
2.5 CAPSULE ORAL 2 TIMES DAILY
Qty: 60 CAPSULE | Refills: 0 | Status: SHIPPED | OUTPATIENT
Start: 2021-01-01

## 2021-01-01 RX ORDER — SODIUM CHLORIDE 0.9 % (FLUSH) 0.9 %
10 SYRINGE (ML) INJECTION AS NEEDED
Status: DISPENSED | OUTPATIENT
Start: 2021-01-01 | End: 2021-01-01

## 2021-01-01 RX ORDER — FUROSEMIDE 40 MG/1
40 TABLET ORAL DAILY
Qty: 90 TABLET | Refills: 3 | Status: SHIPPED | OUTPATIENT
Start: 2021-01-01

## 2021-01-01 RX ORDER — ONDANSETRON 4 MG/1
4 TABLET, ORALLY DISINTEGRATING ORAL
Status: DISCONTINUED | OUTPATIENT
Start: 2021-01-01 | End: 2021-01-01 | Stop reason: HOSPADM

## 2021-01-01 RX ORDER — CAPECITABINE 500 MG/1
1500 TABLET, FILM COATED ORAL
Qty: 84 TABLET | Refills: 11 | Status: ON HOLD | OUTPATIENT
Start: 2021-01-01 | End: 2021-01-01 | Stop reason: SDUPTHER

## 2021-01-01 RX ORDER — ACETAMINOPHEN 325 MG/1
650 TABLET ORAL AS NEEDED
Status: CANCELLED
Start: 2021-01-01

## 2021-01-01 RX ORDER — LIDOCAINE HYDROCHLORIDE AND EPINEPHRINE 10; 10 MG/ML; UG/ML
20 INJECTION, SOLUTION INFILTRATION; PERINEURAL ONCE
Status: COMPLETED | OUTPATIENT
Start: 2021-01-01 | End: 2021-01-01

## 2021-01-01 RX ORDER — ALPRAZOLAM 0.5 MG/1
0.5 TABLET ORAL
Status: DISCONTINUED | OUTPATIENT
Start: 2021-01-01 | End: 2021-01-01 | Stop reason: HOSPADM

## 2021-01-01 RX ORDER — IBUPROFEN 200 MG
CAPSULE ORAL
Qty: 100 STRIP | Refills: 11 | Status: SHIPPED | OUTPATIENT
Start: 2021-01-01

## 2021-01-01 RX ORDER — ATORVASTATIN CALCIUM 10 MG/1
10 TABLET, FILM COATED ORAL DAILY
Status: DISCONTINUED | OUTPATIENT
Start: 2021-01-01 | End: 2021-01-01 | Stop reason: HOSPADM

## 2021-01-01 RX ORDER — PROCHLORPERAZINE MALEATE 10 MG
5 TABLET ORAL
Qty: 60 TABLET | Refills: 3 | Status: SHIPPED | OUTPATIENT
Start: 2021-01-01

## 2021-01-01 RX ORDER — LISINOPRIL 40 MG/1
40 TABLET ORAL DAILY
Status: DISCONTINUED | OUTPATIENT
Start: 2021-01-01 | End: 2021-01-01 | Stop reason: HOSPADM

## 2021-01-01 RX ORDER — MIDAZOLAM HYDROCHLORIDE 1 MG/ML
5 INJECTION INTRAMUSCULAR; INTRAVENOUS
Status: DISCONTINUED | OUTPATIENT
Start: 2021-01-01 | End: 2021-01-01 | Stop reason: HOSPADM

## 2021-01-01 RX ORDER — PALONOSETRON 0.05 MG/ML
0.25 INJECTION, SOLUTION INTRAVENOUS ONCE
Status: CANCELLED | OUTPATIENT
Start: 2021-10-26 | End: 2021-10-26

## 2021-01-01 RX ORDER — SODIUM CHLORIDE 9 MG/ML
25 INJECTION, SOLUTION INTRAVENOUS CONTINUOUS
Status: DISPENSED | OUTPATIENT
Start: 2021-01-01 | End: 2021-01-01

## 2021-01-01 RX ORDER — ALBUTEROL SULFATE 0.83 MG/ML
2.5 SOLUTION RESPIRATORY (INHALATION) AS NEEDED
Status: CANCELLED
Start: 2021-10-26

## 2021-01-01 RX ORDER — DIPHENHYDRAMINE HYDROCHLORIDE 50 MG/ML
50 INJECTION, SOLUTION INTRAMUSCULAR; INTRAVENOUS AS NEEDED
Status: CANCELLED
Start: 2021-10-26

## 2021-01-01 RX ORDER — SODIUM CHLORIDE 9 MG/ML
10 INJECTION INTRAMUSCULAR; INTRAVENOUS; SUBCUTANEOUS AS NEEDED
Status: CANCELLED | OUTPATIENT
Start: 2021-10-26

## 2021-01-01 RX ORDER — CAPECITABINE 150 MG/1
300 TABLET, FILM COATED ORAL
Qty: 56 TABLET | Refills: 11 | Status: SHIPPED | OUTPATIENT
Start: 2021-01-01 | End: 2021-01-01 | Stop reason: SDUPTHER

## 2021-01-01 RX ORDER — DIPHENHYDRAMINE HYDROCHLORIDE 50 MG/ML
25 INJECTION, SOLUTION INTRAMUSCULAR; INTRAVENOUS AS NEEDED
Status: CANCELLED
Start: 2021-10-26

## 2021-01-01 RX ORDER — ENOXAPARIN SODIUM 100 MG/ML
30 INJECTION SUBCUTANEOUS DAILY
Status: DISCONTINUED | OUTPATIENT
Start: 2021-01-01 | End: 2021-01-01 | Stop reason: HOSPADM

## 2021-01-01 RX ORDER — SODIUM CHLORIDE 9 MG/ML
25 INJECTION, SOLUTION INTRAVENOUS CONTINUOUS
Status: CANCELLED
Start: 2021-01-01 | End: 2021-01-01

## 2021-01-01 RX ORDER — FUROSEMIDE 10 MG/ML
40 INJECTION INTRAMUSCULAR; INTRAVENOUS
Status: COMPLETED | OUTPATIENT
Start: 2021-01-01 | End: 2021-01-01

## 2021-01-01 RX ORDER — FENTANYL CITRATE 50 UG/ML
100 INJECTION, SOLUTION INTRAMUSCULAR; INTRAVENOUS
Status: DISCONTINUED | OUTPATIENT
Start: 2021-01-01 | End: 2021-01-01

## 2021-01-01 RX ADMIN — INSULIN LISPRO 2 UNITS: 100 INJECTION, SOLUTION INTRAVENOUS; SUBCUTANEOUS at 17:39

## 2021-01-01 RX ADMIN — ENOXAPARIN SODIUM 30 MG: 30 INJECTION SUBCUTANEOUS at 10:01

## 2021-01-01 RX ADMIN — BARIUM SULFATE 900 ML: 20 SUSPENSION ORAL at 11:49

## 2021-01-01 RX ADMIN — SODIUM CHLORIDE 25 ML/HR: 9 INJECTION, SOLUTION INTRAVENOUS at 09:38

## 2021-01-01 RX ADMIN — Medication 500 UNITS: at 13:10

## 2021-01-01 RX ADMIN — Medication 10 ML: at 18:21

## 2021-01-01 RX ADMIN — PALONOSETRON 0.25 MG: 0.25 INJECTION, SOLUTION INTRAVENOUS at 12:32

## 2021-01-01 RX ADMIN — FUROSEMIDE 40 MG: 40 TABLET ORAL at 09:26

## 2021-01-01 RX ADMIN — SODIUM CHLORIDE 200 MG: 9 INJECTION, SOLUTION INTRAVENOUS at 13:12

## 2021-01-01 RX ADMIN — ATORVASTATIN CALCIUM 20 MG: 20 TABLET, FILM COATED ORAL at 09:19

## 2021-01-01 RX ADMIN — ACETAMINOPHEN 650 MG: 325 TABLET ORAL at 19:35

## 2021-01-01 RX ADMIN — HEPARIN 500 UNITS: 100 SYRINGE at 18:27

## 2021-01-01 RX ADMIN — Medication 10 ML: at 13:05

## 2021-01-01 RX ADMIN — DOCUSATE SODIUM 50MG AND SENNOSIDES 8.6MG 2 TABLET: 8.6; 5 TABLET, FILM COATED ORAL at 09:19

## 2021-01-01 RX ADMIN — SODIUM CHLORIDE 1000 ML: 9 INJECTION, SOLUTION INTRAVENOUS at 15:37

## 2021-01-01 RX ADMIN — ACETAMINOPHEN 650 MG: 325 TABLET ORAL at 09:26

## 2021-01-01 RX ADMIN — ENOXAPARIN SODIUM 30 MG: 30 INJECTION SUBCUTANEOUS at 09:13

## 2021-01-01 RX ADMIN — FUROSEMIDE 40 MG: 10 INJECTION, SOLUTION INTRAMUSCULAR; INTRAVENOUS at 18:21

## 2021-01-01 RX ADMIN — Medication 10 ML: at 16:51

## 2021-01-01 RX ADMIN — HEPARIN SODIUM IN SODIUM CHLORIDE 20 ML: 200 INJECTION INTRAVENOUS at 10:28

## 2021-01-01 RX ADMIN — IOPAMIDOL 80 ML: 755 INJECTION, SOLUTION INTRAVENOUS at 14:32

## 2021-01-01 RX ADMIN — CEFTRIAXONE SODIUM 2 G: 2 INJECTION, POWDER, FOR SOLUTION INTRAMUSCULAR; INTRAVENOUS at 13:29

## 2021-01-01 RX ADMIN — MIDAZOLAM HYDROCHLORIDE 1 MG: 1 INJECTION, SOLUTION INTRAMUSCULAR; INTRAVENOUS at 08:55

## 2021-01-01 RX ADMIN — Medication 10 ML: at 15:47

## 2021-01-01 RX ADMIN — INSULIN LISPRO 2 UNITS: 100 INJECTION, SOLUTION INTRAVENOUS; SUBCUTANEOUS at 17:43

## 2021-01-01 RX ADMIN — Medication 10 ML: at 11:50

## 2021-01-01 RX ADMIN — SODIUM CHLORIDE 10 ML: 9 INJECTION INTRAMUSCULAR; INTRAVENOUS; SUBCUTANEOUS at 08:30

## 2021-01-01 RX ADMIN — ATORVASTATIN CALCIUM 10 MG: 10 TABLET, FILM COATED ORAL at 09:37

## 2021-01-01 RX ADMIN — CEFEPIME HYDROCHLORIDE 2 G: 2 INJECTION, POWDER, FOR SOLUTION INTRAVENOUS at 12:19

## 2021-01-01 RX ADMIN — ATORVASTATIN CALCIUM 20 MG: 20 TABLET, FILM COATED ORAL at 09:26

## 2021-01-01 RX ADMIN — FUROSEMIDE 40 MG: 10 INJECTION, SOLUTION INTRAMUSCULAR; INTRAVENOUS at 17:04

## 2021-01-01 RX ADMIN — CEFEPIME HYDROCHLORIDE 2 G: 2 INJECTION, POWDER, FOR SOLUTION INTRAVENOUS at 23:27

## 2021-01-01 RX ADMIN — FUROSEMIDE 40 MG: 40 TABLET ORAL at 09:38

## 2021-01-01 RX ADMIN — FLUDEOXYGLUCOSE F-18 10.8 MILLICURIE: 200 INJECTION INTRAVENOUS at 11:50

## 2021-01-01 RX ADMIN — HEPARIN 500 UNITS: 100 SYRINGE at 16:47

## 2021-01-01 RX ADMIN — LIDOCAINE HYDROCHLORIDE AND EPINEPHRINE 10 ML: 10; 10 INJECTION, SOLUTION INFILTRATION; PERINEURAL at 10:28

## 2021-01-01 RX ADMIN — AMITRIPTYLINE HYDROCHLORIDE 10 MG: 10 TABLET, FILM COATED ORAL at 22:34

## 2021-01-01 RX ADMIN — Medication 10 ML: at 05:45

## 2021-01-01 RX ADMIN — ACETAMINOPHEN 650 MG: 325 TABLET ORAL at 06:07

## 2021-01-01 RX ADMIN — FENTANYL CITRATE 50 MCG: 50 INJECTION, SOLUTION INTRAMUSCULAR; INTRAVENOUS at 10:19

## 2021-01-01 RX ADMIN — Medication 10 ML: at 06:52

## 2021-01-01 RX ADMIN — INSULIN LISPRO 2 UNITS: 100 INJECTION, SOLUTION INTRAVENOUS; SUBCUTANEOUS at 09:13

## 2021-01-01 RX ADMIN — INSULIN LISPRO 2 UNITS: 100 INJECTION, SOLUTION INTRAVENOUS; SUBCUTANEOUS at 09:19

## 2021-01-01 RX ADMIN — SODIUM CHLORIDE 25 ML/HR: 900 INJECTION, SOLUTION INTRAVENOUS at 10:54

## 2021-01-01 RX ADMIN — Medication 10 ML: at 21:31

## 2021-01-01 RX ADMIN — INSULIN LISPRO 2 UNITS: 100 INJECTION, SOLUTION INTRAVENOUS; SUBCUTANEOUS at 17:49

## 2021-01-01 RX ADMIN — PALONOSETRON HYDROCHLORIDE 0.25 MG: 0.25 INJECTION, SOLUTION INTRAVENOUS at 14:22

## 2021-01-01 RX ADMIN — AZITHROMYCIN MONOHYDRATE 500 MG: 500 INJECTION, POWDER, LYOPHILIZED, FOR SOLUTION INTRAVENOUS at 15:37

## 2021-01-01 RX ADMIN — SODIUM CHLORIDE 559 MG: 9 INJECTION, SOLUTION INTRAVENOUS at 11:35

## 2021-01-01 RX ADMIN — CEFTRIAXONE SODIUM 2 G: 2 INJECTION, POWDER, FOR SOLUTION INTRAMUSCULAR; INTRAVENOUS at 13:57

## 2021-01-01 RX ADMIN — Medication 10 ML: at 15:58

## 2021-01-01 RX ADMIN — Medication 10 ML: at 22:00

## 2021-01-01 RX ADMIN — FENTANYL CITRATE 25 MCG: 50 INJECTION, SOLUTION INTRAMUSCULAR; INTRAVENOUS at 08:58

## 2021-01-01 RX ADMIN — INSULIN LISPRO 2 UNITS: 100 INJECTION, SOLUTION INTRAVENOUS; SUBCUTANEOUS at 11:48

## 2021-01-01 RX ADMIN — ENOXAPARIN SODIUM 30 MG: 30 INJECTION SUBCUTANEOUS at 09:26

## 2021-01-01 RX ADMIN — FUROSEMIDE 40 MG: 10 INJECTION, SOLUTION INTRAMUSCULAR; INTRAVENOUS at 18:16

## 2021-01-01 RX ADMIN — FENTANYL CITRATE 50 MCG: 50 INJECTION, SOLUTION INTRAMUSCULAR; INTRAVENOUS at 10:15

## 2021-01-01 RX ADMIN — LISINOPRIL 40 MG: 40 TABLET ORAL at 10:53

## 2021-01-01 RX ADMIN — INSULIN LISPRO 2 UNITS: 100 INJECTION, SOLUTION INTRAVENOUS; SUBCUTANEOUS at 12:13

## 2021-01-01 RX ADMIN — MIDAZOLAM HYDROCHLORIDE 1 MG: 1 INJECTION, SOLUTION INTRAMUSCULAR; INTRAVENOUS at 10:17

## 2021-01-01 RX ADMIN — Medication 10 ML: at 16:48

## 2021-01-01 RX ADMIN — Medication 10 ML: at 14:55

## 2021-01-01 RX ADMIN — SODIUM CHLORIDE 10 ML: 9 INJECTION INTRAMUSCULAR; INTRAVENOUS; SUBCUTANEOUS at 13:10

## 2021-01-01 RX ADMIN — VANCOMYCIN HYDROCHLORIDE 750 MG: 750 INJECTION, POWDER, LYOPHILIZED, FOR SOLUTION INTRAVENOUS at 15:03

## 2021-01-01 RX ADMIN — Medication 10 ML: at 21:23

## 2021-01-01 RX ADMIN — SODIUM CHLORIDE 10 ML: 9 INJECTION INTRAMUSCULAR; INTRAVENOUS; SUBCUTANEOUS at 11:07

## 2021-01-01 RX ADMIN — Medication 10 ML: at 11:07

## 2021-01-01 RX ADMIN — FUROSEMIDE 40 MG: 40 TABLET ORAL at 09:13

## 2021-01-01 RX ADMIN — CEFEPIME HYDROCHLORIDE 2 G: 2 INJECTION, POWDER, FOR SOLUTION INTRAVENOUS at 22:51

## 2021-01-01 RX ADMIN — AZITHROMYCIN MONOHYDRATE 500 MG: 500 INJECTION, POWDER, LYOPHILIZED, FOR SOLUTION INTRAVENOUS at 16:36

## 2021-01-01 RX ADMIN — FUROSEMIDE 40 MG: 10 INJECTION, SOLUTION INTRAMUSCULAR; INTRAVENOUS at 12:48

## 2021-01-01 RX ADMIN — SODIUM CHLORIDE 359 MG: 9 INJECTION, SOLUTION INTRAVENOUS at 15:42

## 2021-01-01 RX ADMIN — INSULIN LISPRO 2 UNITS: 100 INJECTION, SOLUTION INTRAVENOUS; SUBCUTANEOUS at 09:31

## 2021-01-01 RX ADMIN — AZITHROMYCIN MONOHYDRATE 500 MG: 500 INJECTION, POWDER, LYOPHILIZED, FOR SOLUTION INTRAVENOUS at 15:26

## 2021-01-01 RX ADMIN — CEFEPIME HYDROCHLORIDE 2 G: 2 INJECTION, POWDER, FOR SOLUTION INTRAVENOUS at 22:56

## 2021-01-01 RX ADMIN — OXALIPLATIN 152 MG: 5 INJECTION, SOLUTION INTRAVENOUS at 16:22

## 2021-01-01 RX ADMIN — SODIUM CHLORIDE 420 MG: 9 INJECTION, SOLUTION INTRAVENOUS at 13:53

## 2021-01-01 RX ADMIN — Medication 10 ML: at 21:26

## 2021-01-01 RX ADMIN — SODIUM CHLORIDE 25 ML/HR: 900 INJECTION, SOLUTION INTRAVENOUS at 14:20

## 2021-01-01 RX ADMIN — ACETAMINOPHEN 650 MG: 325 TABLET ORAL at 06:23

## 2021-01-01 RX ADMIN — DEXTROSE MONOHYDRATE 25 ML/HR: 5 INJECTION, SOLUTION INTRAVENOUS at 14:41

## 2021-01-01 RX ADMIN — ATORVASTATIN CALCIUM 10 MG: 10 TABLET, FILM COATED ORAL at 09:30

## 2021-01-01 RX ADMIN — DEXAMETHASONE SODIUM PHOSPHATE 12 MG: 4 INJECTION, SOLUTION INTRA-ARTICULAR; INTRALESIONAL; INTRAMUSCULAR; INTRAVENOUS; SOFT TISSUE at 12:33

## 2021-01-01 RX ADMIN — Medication 10 ML: at 06:23

## 2021-01-01 RX ADMIN — Medication 10 ML: at 08:30

## 2021-01-01 RX ADMIN — ATORVASTATIN CALCIUM 10 MG: 10 TABLET, FILM COATED ORAL at 09:38

## 2021-01-01 RX ADMIN — MIDAZOLAM HYDROCHLORIDE 2 MG: 1 INJECTION, SOLUTION INTRAMUSCULAR; INTRAVENOUS at 10:13

## 2021-01-01 RX ADMIN — ACETAMINOPHEN 650 MG: 325 TABLET ORAL at 21:10

## 2021-01-01 RX ADMIN — ENOXAPARIN SODIUM 30 MG: 30 INJECTION SUBCUTANEOUS at 09:27

## 2021-01-01 RX ADMIN — ACETAMINOPHEN 650 MG: 325 TABLET ORAL at 04:56

## 2021-01-01 RX ADMIN — PIPERACILLIN AND TAZOBACTAM 3.38 G: 3; .375 INJECTION, POWDER, LYOPHILIZED, FOR SOLUTION INTRAVENOUS at 15:28

## 2021-01-01 RX ADMIN — INSULIN LISPRO 2 UNITS: 100 INJECTION, SOLUTION INTRAVENOUS; SUBCUTANEOUS at 12:19

## 2021-01-01 RX ADMIN — Medication 10 ML: at 15:08

## 2021-01-01 RX ADMIN — FENTANYL CITRATE 25 MCG: 50 INJECTION, SOLUTION INTRAMUSCULAR; INTRAVENOUS at 09:01

## 2021-01-01 RX ADMIN — OXALIPLATIN 152 MG: 5 INJECTION, SOLUTION INTRAVENOUS at 14:41

## 2021-01-01 RX ADMIN — DEXAMETHASONE SODIUM PHOSPHATE 12 MG: 4 INJECTION, SOLUTION INTRA-ARTICULAR; INTRALESIONAL; INTRAMUSCULAR; INTRAVENOUS; SOFT TISSUE at 14:24

## 2021-01-01 RX ADMIN — SODIUM CHLORIDE 25 ML/HR: 900 INJECTION, SOLUTION INTRAVENOUS at 12:31

## 2021-01-01 RX ADMIN — FENTANYL CITRATE 50 MCG: 50 INJECTION, SOLUTION INTRAMUSCULAR; INTRAVENOUS at 08:48

## 2021-01-01 RX ADMIN — SODIUM CHLORIDE 25 ML/HR: 9 INJECTION, SOLUTION INTRAVENOUS at 08:21

## 2021-01-01 RX ADMIN — FUROSEMIDE 40 MG: 40 TABLET ORAL at 10:01

## 2021-01-01 RX ADMIN — CEFTRIAXONE SODIUM 2 G: 2 INJECTION, POWDER, FOR SOLUTION INTRAMUSCULAR; INTRAVENOUS at 15:37

## 2021-01-01 RX ADMIN — AMITRIPTYLINE HYDROCHLORIDE 10 MG: 10 TABLET, FILM COATED ORAL at 21:41

## 2021-01-01 RX ADMIN — SODIUM CHLORIDE 200 MG: 9 INJECTION, SOLUTION INTRAVENOUS at 10:55

## 2021-01-01 RX ADMIN — SODIUM CHLORIDE 1000 ML: 900 INJECTION, SOLUTION INTRAVENOUS at 15:30

## 2021-01-01 RX ADMIN — Medication 5 ML: at 16:07

## 2021-01-01 RX ADMIN — FUROSEMIDE 40 MG: 40 TABLET ORAL at 09:19

## 2021-01-01 RX ADMIN — CEFAZOLIN SODIUM 2 G: 1 INJECTION, POWDER, FOR SOLUTION INTRAMUSCULAR; INTRAVENOUS at 09:38

## 2021-01-01 RX ADMIN — DEXTROSE MONOHYDRATE 25 ML/HR: 5 INJECTION, SOLUTION INTRAVENOUS at 16:22

## 2021-01-01 RX ADMIN — CEFEPIME HYDROCHLORIDE 2 G: 2 INJECTION, POWDER, FOR SOLUTION INTRAVENOUS at 12:05

## 2021-01-01 RX ADMIN — Medication 10 ML: at 16:05

## 2021-01-01 RX ADMIN — ATORVASTATIN CALCIUM 20 MG: 20 TABLET, FILM COATED ORAL at 09:13

## 2021-01-01 RX ADMIN — SODIUM CHLORIDE, PRESERVATIVE FREE 300 UNITS: 5 INJECTION INTRAVENOUS at 10:28

## 2021-01-01 RX ADMIN — LIDOCAINE HYDROCHLORIDE 8 ML: 20 INJECTION, SOLUTION INFILTRATION; PERINEURAL at 10:28

## 2021-01-01 RX ADMIN — AMITRIPTYLINE HYDROCHLORIDE 10 MG: 10 TABLET, FILM COATED ORAL at 21:10

## 2021-01-01 RX ADMIN — FUROSEMIDE 40 MG: 10 INJECTION, SOLUTION INTRAMUSCULAR; INTRAVENOUS at 09:30

## 2021-01-01 RX ADMIN — CEFEPIME HYDROCHLORIDE 2 G: 2 INJECTION, POWDER, FOR SOLUTION INTRAVENOUS at 00:10

## 2021-01-01 RX ADMIN — Medication 10 ML: at 21:21

## 2021-01-01 RX ADMIN — ATORVASTATIN CALCIUM 20 MG: 20 TABLET, FILM COATED ORAL at 10:01

## 2021-01-01 RX ADMIN — HEPARIN 500 UNITS: 100 SYRINGE at 15:58

## 2021-01-01 RX ADMIN — MIDAZOLAM HYDROCHLORIDE 1 MG: 1 INJECTION, SOLUTION INTRAMUSCULAR; INTRAVENOUS at 08:49

## 2021-01-01 RX ADMIN — HEPARIN 500 UNITS: 100 SYRINGE at 16:52

## 2021-01-01 RX ADMIN — LIDOCAINE HYDROCHLORIDE 10 ML: 10 INJECTION, SOLUTION EPIDURAL; INFILTRATION; INTRACAUDAL; PERINEURAL at 15:11

## 2021-01-01 RX ADMIN — AMITRIPTYLINE HYDROCHLORIDE 10 MG: 10 TABLET, FILM COATED ORAL at 21:31

## 2021-01-01 RX ADMIN — SODIUM CHLORIDE 200 MG: 9 INJECTION, SOLUTION INTRAVENOUS at 15:05

## 2021-01-01 RX ADMIN — LISINOPRIL 40 MG: 40 TABLET ORAL at 10:56

## 2021-01-01 RX ADMIN — VANCOMYCIN HYDROCHLORIDE 750 MG: 750 INJECTION, POWDER, LYOPHILIZED, FOR SOLUTION INTRAVENOUS at 16:05

## 2021-01-01 RX ADMIN — SODIUM CHLORIDE, POTASSIUM CHLORIDE, SODIUM LACTATE AND CALCIUM CHLORIDE 1000 ML: 600; 310; 30; 20 INJECTION, SOLUTION INTRAVENOUS at 14:57

## 2021-01-01 RX ADMIN — Medication 10 ML: at 15:29

## 2021-01-01 RX ADMIN — Medication 10 ML: at 18:17

## 2021-01-01 RX ADMIN — VANCOMYCIN HYDROCHLORIDE 750 MG: 750 INJECTION, POWDER, LYOPHILIZED, FOR SOLUTION INTRAVENOUS at 15:47

## 2021-01-01 RX ADMIN — INSULIN LISPRO 2 UNITS: 100 INJECTION, SOLUTION INTRAVENOUS; SUBCUTANEOUS at 18:16

## 2021-01-01 RX ADMIN — VANCOMYCIN HYDROCHLORIDE 1250 MG: 10 INJECTION, POWDER, LYOPHILIZED, FOR SOLUTION INTRAVENOUS at 16:06

## 2021-01-01 RX ADMIN — Medication 10 ML: at 18:26

## 2021-01-01 RX ADMIN — FUROSEMIDE 40 MG: 10 INJECTION, SOLUTION INTRAMUSCULAR; INTRAVENOUS at 10:56

## 2021-05-19 NOTE — PROGRESS NOTES
Rodney Pineda is a 76 y.o. female who was seen by synchronous (real-time) audio-video technology on 5/19/2021 for Sinus Infection (doxy [id number]) Assessment & Plan:  
Diagnoses and all orders for this visit: 
 
1. Facial swelling Discussed potential etiologies from allergic reaction to angioedema (is on ACEi) and others. Recommend pt try antihistamines (may do zyrtec AM and Benadryl PM) discussed lowering or holding her ramipril so long as her BP stays below 140/90 (she reports 120s/70s lately at home) and plan a f/u apt with her PCP for ongoing evaluation. Also counseled on \"red flag\" sxs including anaphylaxis and to call 911/to nearest ER if develops any such symptoms or has any other major concerns arise. Pt verbalizes understanding and agrees with the plan. The complexity of medical decision making for this visit is moderate I spent at least 15 minutes on this visit with this established patient. Subjective: Pt with puffiness over/under eyes and feels like her face is swollen x 1 week. Symptoms have remained unchanged since onset. She denies having any pain or visual changes. No other symptoms, otherwise feels well. Has never had allergies, but did start this year, but they have improved. Has been taking Claritin w/o relief. Prior to Admission medications Medication Sig Start Date End Date Taking? Authorizing Provider  
glipiZIDE (GLUCOTROL) 5 mg tablet TAKE 1 TABLET BY MOUTH EVERY DAY 5/2/21  Yes Joe Gomez III, DO  
glucose blood VI test strips (blood glucose test) strip Pt tests daily. DX: E11.65, Please provide with one touch ultra test strips 1/4/21  Yes Joe Gomez III, DO  
amitriptyline (ELAVIL) 10 mg tablet Take 1 Tab by mouth nightly.  Indications: sleep/anxiety 12/15/20  Yes Laila Parker NP  
ramipriL (ALTACE) 10 mg capsule TAKE 2 CAPSULES BY MOUTH EVERY DAY 10/1/20  Yes Joe Gomez III, DO  
metFORMIN (GLUCOPHAGE) 500 mg tablet TAKE 1 TABLET BY MOUTH TWICE A DAY WITH FOOD 6/16/20  Yes Joe Gomez III, DO  
simvastatin (ZOCOR) 20 mg tablet TAKE 1 TABLET BY MOUTH EVERY DAY AT NIGHT 6/16/20  Yes Sabine Ham, DO Blood-Glucose Meter misc Pt tests daily. Dx: E11.65 3/15/18  Yes Contreras Hayes MD  
glucose blood VI test strips (ACCU-CHEK MARY GRACE) strip Diagnosis E11.65. Pt is testing once daily. 3/12/18  Yes Contreras Hayes MD  
ALPRAZolam [place] Stager) 0.5 mg tablet Take 1 Tab by mouth nightly as needed for Anxiety. Max Daily Amount: 0.5 mg. 8/10/20   Chad Monroy III, DO Patient Active Problem List  
Diagnosis Code  Diabetes mellitus ([facility] E11.9  Hypertension I10  Diverticulosis K57.90  
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  
 Hyperlipidemia associated with type 2 diabetes mellitus (HCC) E11.69, E78.5  Right renal mass N28.89 Patient Active Problem List  
 Diagnosis Date Noted  Right renal mass 08/24/2020  Hyperlipidemia associated with type 2 diabetes mellitus ([facility] [address] 03/18/2019  Insomnia secondary to anxiety 11/08/2016  Diverticulosis 06/25/2014  Diabetes mellitus ([facility] 07/15/2013  Hypertension 07/15/2013 Current Outpatient Medications Medication Sig Dispense Refill  glipiZIDE (GLUCOTROL) 5 mg tablet TAKE 1 TABLET BY MOUTH EVERY DAY 90 Tab 3  
 glucose blood VI test strips (blood glucose test) strip Pt tests daily. DX: E11.65, Please provide with one touch ultra test strips 100 Strip 11  
 amitriptyline (ELAVIL) 10 mg tablet Take 1 Tab by mouth nightly. Indications: sleep/anxiety 30 Tab 5  
 ramipriL (ALTACE) 10 mg capsule TAKE 2 CAPSULES BY MOUTH EVERY  Cap 3  
 metFORMIN (GLUCOPHAGE) 500 mg tablet TAKE 1 TABLET BY MOUTH TWICE A DAY WITH FOOD 180 Tab 3  
 simvastatin (ZOCOR) 20 mg tablet TAKE 1 TABLET BY MOUTH EVERY DAY AT NIGHT 90 Tab 3  Blood-Glucose Meter misc Pt tests daily.   Dx: E11.65 1 Each 0  
 glucose blood VI test strips (ACCU-CHEK MARY GRACE) strip Diagnosis E11.65. Pt is testing once daily. 100 Strip 1  
 ALPRAZolam (XANAX) 0.5 mg tablet Take 1 Tab by mouth nightly as needed for Anxiety. Max Daily Amount: 0.5 mg. 30 Tab 0 No Known Allergies Past Medical History:  
Diagnosis Date  Chronic kidney disease  Diabetes ([facility] [address] Type II  
 History of kidney cancer 06/2020  
 stage 2, s/p nephrectomy, no further tx necessary  Hypercholesterolemia  Hypertension  Psychiatric disorder   
 anxiety Past Surgical History:  
Procedure Laterality Date  COLONOSCOPY N/A 12/10/2019 COLONOSCOPY performed by Idalia Yang MD at [facility] ENDOSCOPY  
 HX CATARACT REMOVAL Bilateral 2015  HX CATARACT REMOVAL  01/09/2018  
 secondary cataracts removed [facility] AND CURETTAGE  2000  HX HYSTERECTOMY  2000  
 complete  HX NEPHRECTOMY Right 2020  
 stage 2 cancer, no further tx needed Family History Adopted: Yes  
Problem Relation Age of Onset  Cancer Daughter   
     lung  Cancer Son   
     colon Social History Tobacco Use  Smoking status: Never Smoker  Smokeless tobacco: Never Used Substance Use Topics  Alcohol use: No  
  Alcohol/week: 0.0 standard drinks ROS Objective:  
 
Patient-Reported Vitals 5/19/2021 Patient-Reported Weight 160 Patient-Reported Height 54 Patient-Reported Pulse 99 Patient-Reported Systolic  083 Patient-Reported Diastolic 78  
  
 
[INSTRUCTIONS:  \"[x]\" Indicates a positive item  \"[]\" Indicates a negative item  -- DELETE ALL ITEMS NOT EXAMINED] Constitutional: [x] Appears well-developed and well-nourished [x] No apparent distress   
  [] Abnormal - Mental status: [x] Alert and awake  [x] Oriented to person/place/time [x] Able to follow commands   
[] Abnormal - Eyes:   EOM    [x]  Normal    [] Abnormal -  
Sclera  [x]  Normal    [] Abnormal - 
        Discharge [x]  None visible   [] Abnormal - Puffiness noted around both eyes and upper cheeks. Pt reports no tenderness with palpation. There is no erythema and rash. HENT: [x] Normocephalic, atraumatic  [] Abnormal -  
[x] Mouth/Throat: Mucous membranes are moist 
 
External Ears [x] Normal  [] Abnormal - Neck: [x] No visualized mass [] Abnormal - Pulmonary/Chest: [x] Respiratory effort normal   [x] No visualized signs of difficulty breathing or respiratory distress 
      [] Abnormal - Musculoskeletal:   [x] Normal gait with no signs of ataxia [x] Normal range of motion of neck [] Abnormal -  
 
Neurological:        [x] No Facial Asymmetry (Cranial nerve 7 motor function) (limited exam due to video visit) [x] No gaze palsy  
     [] Abnormal -   
     
Skin:        [x] No significant exanthematous lesions or discoloration noted on facial skin   
     [] Abnormal - Psychiatric:       [x] Normal Affect [] Abnormal -  
     [x] No Hallucinations Other pertinent observable physical exam findings:- 
 
 
 
We discussed the expected course, resolution and complications of the diagnosis(es) in detail. Medication risks, benefits, costs, interactions, and alternatives were discussed as indicated. I advised her to contact the office if her condition worsens, changes or fails to improve as anticipated. She expressed understanding with the diagnosis(es) and plan. Vamsi Larson, was evaluated through a synchronous (real-time) audio-video encounter. The patient (or guardian if applicable) is aware that this is a billable service. Verbal consent to proceed has been obtained within the past 12 months. The visit was conducted pursuant to the emergency declaration under the [address] [place] [address] authority and the [employer] General Act. Patient identification was verified, and a caregiver was present when appropriate.  The patient was located in a state where the provider was credentialed to provide care.  
 
 
Nisha Schafer PA-C

## 2021-05-21 NOTE — TELEPHONE ENCOUNTER
Patient called in stating she has been experiencing feet and facial swelling for the last few days. She has been taking zyrtec and benadryl for the swelling and nothing has helped. She would like to have a nurse call to advise she doesn't have to proceed through the weekend with swelling.

## 2021-05-21 NOTE — TELEPHONE ENCOUNTER
----- Message from Evangelina Green sent at 5/21/2021  3:56 PM EDT -----  Regarding: Dr. Jose Soto Message/Vendor Calls    Caller's first and last name: Pt       Reason for call: Calling to see why no one has called her yet regarding the swelling in her legs and face.        Callback required yes/no and why: yes, to discuss       Best contact number(s): [phone]      Message from [facility]

## 2021-05-24 NOTE — TELEPHONE ENCOUNTER
Called patient. ID verified with Name and . Patient states that she has been experiencing puffy face and eyes at this time. Patient states that she did have a Virtual Visit with SAMUEL Hayes in regards to complaints. Patient states that she was told to try Zyrtec in the morning and Benadryl at bedtime. Patient states that she has tried recommendations and symptoms have not subsided at this time. Patient states that she has also tried Mucinex, Flonase, and Nasacort as well and has not had any relief from symptoms. Patient seeking further recommendations in regards to symptoms at this time.

## 2021-05-26 NOTE — PROGRESS NOTES
Angela Farrar is a 76 y.o. female who was seen by synchronous (real-time) audio-only technology on 5/26/2021 for Foot Swelling (and face has been about 3 weeks, 6 otc products) and Sinus Infection Progress Note SUBJECTIVE Ms. Angela Farrar is a patient of Aniyah Carlson DO and presents today acutely for Chief Complaint Patient presents with  Foot Swelling  
  and face has been about 3 weeks, 6 otc products  Sinus Infection \"I'm going on three weeks of putting up with this junk and I've just about had it. \"  She had congestion, cough, puffy eyes. Appetite is low. She has ear pain. +Fatigue. +Headache. She has tried claritin, benadryl, zyrtec, flonase. OBJECTIVE Visit Vitals LMP  (LMP Unknown) Gen: Pleasant 76 y.o.  female in NAD.   
 
 
ASSESSMENT / PLAN 
  ICD-10-CM ICD-9-CM 1. Acute sinusitis, recurrence not specified, unspecified location  J01.90 461.9 amoxicillin-clavulanate (AUGMENTIN) 875-125 mg per tablet May continue the OTC measures. Follow-up and Dispositions · Return if symptoms worsen or fail to improve. Patient-Reported Vitals 5/19/2021 Patient-Reported Weight 160 Patient-Reported Height 54 Patient-Reported Pulse 99 Patient-Reported Systolic  921 Patient-Reported Diastolic 78 Angela Farrar, who was evaluated through a patient-initiated, synchronous (real-time) audio only encounter, and/or her healthcare decision maker, is aware that it is a billable service, with coverage as determined by her insurance carrier. She provided verbal consent to proceed: Yes. She has not had a related appointment within my department in the past 7 days or scheduled within the next 24 hours. Total Time: minutes: 5-10 minutes Lynnette Estrada MD

## 2021-05-27 NOTE — TELEPHONE ENCOUNTER
Called patient, 2 identifiers. Advised that the referral is dont for Amara Cowan and was faxed to his office. Will mail referral to patient also. No further questions at this time.
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
## 2021-06-21 NOTE — PATIENT INSTRUCTIONS
Medicare Wellness Visit, Female     The best way to live healthy is to have a lifestyle where you eat a well-balanced diet, exercise regularly, limit alcohol use, and quit all forms of tobacco/nicotine, if applicable. Regular preventive services are another way to keep healthy. Preventive services (vaccines, screening tests, monitoring & exams) can help personalize your care plan, which helps you manage your own care. Screening tests can find health problems at the earliest stages, when they are easiest to treat. [facility] follows the current, evidence-based guidelines published by the [facility] ([facility]) when recommending preventive services for our patients. Because we follow these guidelines, sometimes recommendations change over time as research supports it. (For example, mammograms used to be recommended annually. Even though Medicare will still pay for an annual mammogram, the newer guidelines recommend a mammogram every two years for women of average risk). Of course, you and your doctor may decide to screen more often for some diseases, based on your risk and your co-morbidities (chronic disease you are already diagnosed with). Preventive services for you include:  - Medicare offers their members a free annual wellness visit, which is time for you and your primary care provider to discuss and plan for your preventive service needs. Take advantage of this benefit every year!  -All adults over the age of 72 should receive the recommended pneumonia vaccines. Current USPSTF guidelines recommend a series of two vaccines for the best pneumonia protection.   -All adults should have a flu vaccine yearly and a tetanus vaccine every 10 years.   -All adults age 48 and older should receive the shingles vaccines (series of two vaccines).       -All adults age 38-68 who are overweight should have a diabetes screening test once every three years.   -All adults born between [birth year] and 1965 should be screened once for Hepatitis C.  -Other screening tests and preventive services for persons with diabetes include: an eye exam to screen for diabetic retinopathy, a kidney function test, a foot exam, and stricter control over your cholesterol.   -Cardiovascular screening for adults with routine risk involves an electrocardiogram (ECG) at intervals determined by your doctor.   -Colorectal cancer screenings should be done for adults age 54-65 with no increased risk factors for colorectal cancer. There are a number of acceptable methods of screening for this type of cancer. Each test has its own benefits and drawbacks. Discuss with your doctor what is most appropriate for you during your annual wellness visit. The different tests include: colonoscopy (considered the best screening method), a fecal occult blood test, a fecal DNA test, and sigmoidoscopy.    -A bone mass density test is recommended when a woman turns 65 to screen for osteoporosis. This test is only recommended one time, as a screening. Some providers will use this same test as a disease monitoring tool if you already have osteoporosis. -Breast cancer screenings are recommended every other year for women of normal risk, age 54-69.  -Cervical cancer screenings for women over age 72 are only recommended with certain risk factors. Here is a list of your current Health Maintenance items (your personalized list of preventive services) with a due date:  Health Maintenance Due   Topic Date Due    Diabetic Foot Care  01/15/2019    Cholesterol Test   12/09/2020    Hemoglobin A1C    02/12/2021       Would wear compression stockings during the day. Would get the middle grade of compression.

## 2021-06-22 PROBLEM — I31.39 PERICARDIAL EFFUSION: Status: ACTIVE | Noted: 2021-01-01

## 2021-06-22 NOTE — ED PROVIDER NOTES
EMERGENCY DEPARTMENT HISTORY AND PHYSICAL EXAM      Date: 6/22/2021  Patient Name: Jersey Beverly    History of Presenting Illness     Chief Complaint   Patient presents with    Shortness of Breath     sob with chest tightness x 2 days. pt has no pulmonary issues       History Provided By: Patient    HPI: Jersey Beverly, 68 y.o. female presents to the ED with chest tightness and shortness of breath for 2 days. She does not know of any lung issues that she has but is in the process of getting a cardiac evaluation including a stress test.  She describes her chest tightness as \"it is just getting harder to breathe. Described with patient, prompting ED visit today. She denies any fever or productive cough, no known exposure to recent illnesses and has been vaccinated with a COVID-19 vaccine. She is a non-smoker, status post nephrectomy after incidental finding of a renal mass, did not require chemotherapy or radiation. There are no other complaints, changes, or physical findings at this time. PCP: April Sheehan, DO    No current facility-administered medications on file prior to encounter. Current Outpatient Medications on File Prior to Encounter   Medication Sig Dispense Refill    azelastine (ASTELIN) 137 mcg (0.1 %) nasal spray 1 [place] by Both Nostrils route two (2) times daily as needed for Rhinitis. Use in each nostril as directed 1 Bottle 1    simvastatin (ZOCOR) 20 mg tablet TAKE 1 TABLET BY MOUTH EVERY NIGHT 90 Tablet 3    metFORMIN (GLUCOPHAGE) 500 mg tablet TAKE 1 TABLET BY MOUTH TWICE A DAY WITH FOOD 180 Tablet 3    glipiZIDE (GLUCOTROL) 5 mg tablet TAKE 1 TABLET BY MOUTH EVERY DAY 90 Tab 3    glucose blood VI test strips (blood glucose test) strip Pt tests daily. DX: E11.65, Please provide with one touch ultra test strips 100 Strip 11    amitriptyline (ELAVIL) 10 mg tablet Take 1 Tab by mouth nightly.  Indications: sleep/anxiety (Patient not taking: Reported on 6/21/2021) 30 Tab 5    ramipriL (ALTACE) 10 mg capsule TAKE 2 CAPSULES BY MOUTH EVERY  Cap 3    ALPRAZolam (XANAX) 0.5 mg tablet Take 1 Tab by mouth nightly as needed for Anxiety. Max Daily Amount: 0.5 mg. 30 Tab 0    Blood-Glucose Meter misc Pt tests daily. Dx: E11.65 1 Each 0    glucose blood VI test strips (ACCU-CHEK MARY GRACE) strip Diagnosis E11.65. Pt is testing once daily. 100 Strip 1       Past History     Past Medical History:  Past Medical History:   Diagnosis Date    Chronic kidney disease     Diabetes ([facility] [address]     Type II    History of kidney cancer 06/2020    stage 2, s/p nephrectomy, no further tx necessary    Hypercholesterolemia     Hypertension     Psychiatric disorder     anxiety       Past Surgical History:  Past Surgical History:   Procedure Laterality Date    COLONOSCOPY N/A 12/10/2019    COL[facility]go [facility]os [facility] at [facility] ENDOSCOPY    HX CATARACT REMOVAL Bilateral 2015    HX CATARACT REMOVAL  01/09/2018    secondary cataracts removed    HX CHOLECYSTECTOMY  1993    HX DILATION AND CURETTAGE  2000    HX HYSTERECTOMY  2000    complete    HX NEPHRECTOMY Right 2020    stage 2 cancer, no further tx needed       Family History:  Family History   Adopted: Yes   Problem Relation Age of Onset    Cancer Daughter         lung    Cancer Son         colon       Social History:  Social History     Tobacco Use    Smoking status: Never Smoker    Smokeless tobacco: Never Used   Substance Use Topics    Alcohol use: No     Alcohol/week: 0.0 standard drinks    Drug use: No       Allergies:  No Known Allergies      Review of Systems   Review of Systems   Constitutional: Negative. HENT: Negative. Respiratory: Positive for chest tightness and shortness of breath. Negative for apnea, cough, choking, wheezing and stridor. Cardiovascular: Positive for chest pain and leg swelling. Negative for palpitations.    Gastrointestinal: Negative for abdominal distention and abdominal pain.   Genitourinary: Negative for difficulty urinating, frequency and urgency. Neurological: Negative for dizziness and light-headedness. All other systems reviewed and are negative. Physical Exam   Physical Exam   Vital signs and nursing notes reviewed    CONSTITUTIONAL: Alert, in mild distress; well-developed; well-nourished. HEAD:  Normocephalic, atraumatic  EYES: PERRL; EOM's intact. ENTM: Nose: no rhinorrhea; Throat: no erythema or exudate, mucous membranes moist  Neck:  Supple. trachea is midline. RESP: Decreased breath sounds on the left compared to the right, talking in full sentences, 95% on room air. Increased work of breathing with any movement around the bed. CV: S1 and S2 WNL; No murmurs, gallops or rubs. 2+ radial and DP pulses bilaterally. GI: non-distended, normal bowel sounds, abdomen soft and non-tender. No masses or organomegaly. : No costo-vertebral angle tenderness. BACK:  Non-tender, normal appearance  UPPER EXT:  Normal inspection. no joint or soft tissue swelling  LOWER EXT: No edema, no calf tenderness. NEURO: Alert and oriented x3, 5/5 strength and light touch sensation intact in bilateral upper and lower extremities. SKIN: No rashes;  Warm and dry  PSYCH: Normal mood, normal affect    Diagnostic Study Results     Labs -     Recent Results (from the past 12 hour(s))   EKG, 12 LEAD, INITIAL    Collection Time: 06/22/21  9:35 AM   Result Value Ref Range    Ventricular Rate 107 BPM    Atrial Rate 107 BPM    P-R Interval 118 ms    QRS Duration 58 ms    Q-T Interval 320 ms    QTC Calculation (Bezet) 427 ms    Calculated P Axis 33 degrees    Calculated R Axis 23 degrees    Calculated T Axis 34 degrees    Diagnosis       Sinus tachycardia  Low voltage QRS  Septal infarct , age undetermined  When compared with ECG of 10-ADOLFO-2020 23:03,  Septal infarct is now present         Radiologic Studies -   XR CHEST PORT    (Results Pending)     CT Results  (Last 48 hours)    None CXR Results  (Last 48 hours)    None          Medical Decision Making   I am the first provider for this patient. I reviewed the vital signs, available nursing notes, past medical history, past surgical history, family history and social history. Vital Signs-Reviewed the patient's vital signs. Patient Vitals for the past 12 hrs:   Temp Pulse Resp BP SpO2   06/22/21 0930 98.3 °F (36.8 °C) (!) 107 22 (!) 154/87 100 %       EKG interpretation: (Preliminary)  EKG performed at 9:35 AM shows a sinus tachycardia at a rate of 107, normal axis, normal intervals cute ischemic changes, low voltage QRS. Records Reviewed: Nursing notes and medical records    Provider Notes (Medical Decision Making):   78-year-old female with initial concerning confusion with potential for drainage, better characterized as effusion as well as pericardial effusion with borderline tachycardia and worsening of tachycardia with additional diuresis due to elevated BNP. Discussed with cardiology, may be window but will start with echocardiogram first.  Patient had CT finding does raise concern for cancer or, potentially primary versus metastatic disease, unclear if related to prior kidney cancer. Plan for admission. Will provide IV fluid bolus given borderline tachycardia and visualized pericardial effusion. Can diurese if needed on admission after pericardial effusion relieved. ED Course:   Initial assessment performed. The patients presenting problems have been discussed, and they are in agreement with the care plan formulated and outlined with them. I have encouraged them to ask questions as they arise throughout their visit. ED Course as of Jun 22 1529   Tue Jun 22, 2021   1259 CT changed from CTA to dry due to patient being mononephric. Will get VQ scan and dry CT to characterize effusion before IR drains. [TL]   1453 Spoke with cardiology while patient is getting left thoracentesis.   With concern for a metastatic disease, cardiology want to start with echocardiogram, may need a window. Discussed that I initially diuresed her but now is borderline tachycardic and Dr. Heydi Hernandez is agreeable to to my plan of giving bolus of fluid. [TL]      ED Course User Index  [TL] Jeanna Rosas MD             Disposition:  Admit    Admit Note:    Pt is being admitted. The results of their tests and reason(s) for their admission have been discussed with pt and/or available family. They convey agreement and understanding for the need to be admitted and for admission diagnosis. Diagnosis     Clinical Impression: Pericardial effusion  Left pleural effusion  Tachypnea  Tachycardia    Attestations:    Rhiannon Sampson MD    Please note that this dictation was completed with Aivo, the computer voice recognition software. Quite often unanticipated grammatical, syntax, homophones, and other interpretive errors are inadvertently transcribed by the computer software. Please disregard these errors. Please excuse any errors that have escaped final proofreading. Thank you.

## 2021-06-22 NOTE — PROGRESS NOTES
Letter mailed to patient. Labs stable.  Dm improved, a1c down to 6.8.  cholesterol levels also improved. Kidney function is a bit worse.  Make sure to limit nsaids (ibuprofen, aleve, advil, etc).   Anemia is stable.  Continue same meds.

## 2021-06-22 NOTE — PROGRESS NOTES
Nuclear Medicine - Discussed Lung scan with . Will post pone scan until Wednesday AM due to thoracentesis today.     Will schedule Lung study for 8:00 am -radioactive material ordered for 6/23/21 am

## 2021-06-22 NOTE — PROGRESS NOTES
1630 Received call from lab. Lactic acid 2.8. Notified attending MD, Dr. Luis Fernando Maxwell, and primary RN, Juan C Rasmussen.

## 2021-06-22 NOTE — ROUTINE PROCESS
Pt arrives from ER for Thoracentesis, consent obtained. Pt assisted to bedpan for moderate amount of clear yellow urine.

## 2021-06-22 NOTE — ED NOTES
Assumed care of pt from triage. Pt is A&O x 4. Pt reports CC of shortness of breath and bilateral leg swelling. Pt reports she has been sob for a few days but it became worse yesterday. Pt was seen by her PCP yesterday. Pt oxygen at this time is 97% on RA. Pt also reports a dry cough. Pt has been vaccinated for COVID. Pt resting on stretcher in POC with call bell in reach. Pt placed on monitor x 3. VSS at this time. 1226: Pt resting on stretcher in POC with call bell in reach. Pt remains on monitor x 3. VSS at this time. 1252: Medicated pt per orders. Pt off the floor to CT.     1442: Pt off the floor for thoracentesis. Per Dr. Melissa Diaz okay to begin antibiotics when pt comes back. 1547: Patient is being transferred to [facility], Room # 61 51 81. Report given to Carmen Oconnor on [facility] for routine progression of care. Report consisted of the following information SBAR, ED Summary and Recent Results. Patient transferred to receiving unit by: transport (RN or tech name). Outstanding consults needed: No     Next labs due: No     The following personal items will be sent with the patient during transfer to the floor:     All valuables:    Cardiac monitoring ordered: Yes    The following CURRENT information was reported to the receiving RN:    Code status: Full Code at time of transfer    Last set of vital signs:  Vital Signs  Level of Consciousness: Alert (0) (06/22/21 1517)  Temp: 98.3 °F (36.8 °C) (06/22/21 0930)  Temp Source: Oral (06/22/21 0930)  Pulse (Heart Rate): (!) 105 (06/22/21 1517)  Heart Rate Source: Monitor (06/22/21 1517)  Cardiac Rhythm: Sinus Rhythm (06/22/21 1517)  Resp Rate: 20 (06/22/21 1517)  BP: (!) 162/77 (06/22/21 1450)  MAP (Monitor): 89 (06/22/21 1415)  MAP (Calculated): 105 (06/22/21 1450)  BP 1 Location: Left arm (06/22/21 1517)  BP 1 Method: Automatic (06/22/21 1517)  BP Patient Position: At rest (06/22/21 1450)  MEWS Score: 3 (06/22/21 0930) Oxygen Therapy  O2 Sat (%): 100 % (06/22/21 1517)  Pulse via Oximetry: 107 beats per minute (06/22/21 1415)  O2 Device: None (Room air) (06/22/21 1450)      Last pain assessment:  Pain 1  Pain Scale 1: Numeric (0 - 10)  Pain Intensity 1: 0  Patient Stated Pain Goal: 0      Wounds: No     Urinary catheter: voiding  Is there a workman order: No     LDAs:       Peripheral IV 06/22/21 Right Antecubital (Active)   Site Assessment Clean, dry, & intact 06/22/21 0949   Phlebitis Assessment 0 06/22/21 0949   Infiltration Assessment 0 06/22/21 0949   Dressing Status Clean, dry, & intact 06/22/21 0949   Dressing Type Transparent 06/22/21 0949   Hub Color/Line Status Pink;Flushed;Patent 06/22/21 0831         Opportunity for questions and clarification was provided.     Monda Holter

## 2021-06-22 NOTE — CONSULTS
Cardiology Consult Note       Date of  Admission: 6/22/2021  9:43 AM     Admission type:Emergency     Subjective:     Ms. Maricarmen Wallis is admitted with 2-3 weeks h/o SOB, edema, cough. X-ray, chest CT revealed left pleural effusion and pericardial affusion. She is s/p left thoracentesis with removal of 800 ml fluid. EKG shows small R waves, no electrical alternans. CT also suggestive of new hepatic.pericardial lesions concerning for metastatic disease. She has h/o renal cancer s/p right nephrectomy last year. No XRT or chemo.      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/08/2016    Diverticulosis 06/25/2014    Diabetes mellitus ([facility] [address] 07/15/2013    Hypertension 07/15/2013      Jett Martinez DO  Past Medical History:   Diagnosis Date    Chronic kidney disease     Diabetes ([facility] [address]     Type II    History of kidney cancer 06/2020    stage 2, s/p nephrectomy, no further tx necessary    Hypercholesterolemia     Hypertension     Psychiatric disorder     anxiety      Past Surgical History:   Procedure Laterality Date    COLONOSCOPY N/A 12/10/2019    COLONOSCOPY performed by Carrillo Oro MD at hospitals ENDOSCOPY    HX CATARACT REMOVAL Bilateral 2015    HX CATARACT REMOVAL  01/09/2018    secondary cataracts removed    HX CHOLECYSTECTOMY  1993    HX DILATION AND CURETTAGE  2000    HX HYSTERECTOMY  2000    complete    HX NEPHRECTOMY Right 2020    stage 2 cancer, no further tx needed     No Known Allergies   Family History   Adopted: Yes   Problem Relation Age of Onset    Cancer Daughter         lung    Cancer Son         colon      Current Facility-Administered Medications   Medication Dose Route Frequency    technetium albumin aggregated (MAA) solution 4 millicurie  4 millicurie IntraVENous NOW    cefTRIAXone (ROCEPHIN) 2 g in 0.9% sodium chloride (MBP/ADV) 50 mL MBP  2 g IntraVENous Q24H    azithromycin (ZITHROMAX) 500 mg in 0.9% sodium chloride 250 mL (VIAL-MATE)  500 mg IntraVENous Q24H    acetaminophen (TYLENOL) tablet 650 mg  650 mg Oral Q6H PRN    ALPRAZolam (XANAX) tablet 0.5 mg  0.5 mg Oral QHS PRN    . PHARMACY TO SUBSTITUTE PER PROTOCOL (Reordered from: azelastine (ASTELIN) 137 mcg (0.1 %) nasal spray)    Per Protocol    . PHARMACY TO SUBSTITUTE PER PROTOCOL (Reordered from: ramipriL (ALTACE) 10 mg capsule)    Per Protocol    [START ON 6/23/2021] atorvastatin (LIPITOR) tablet 10 mg  10 mg Oral DAILY    sodium chloride (NS) flush 5-40 mL  5-40 mL IntraVENous Q8H    sodium chloride (NS) flush 5-40 mL  5-40 mL IntraVENous PRN    acetaminophen (TYLENOL) tablet 650 mg  650 mg Oral Q6H PRN    Or    acetaminophen (TYLENOL) suppository 650 mg  650 mg Rectal Q6H PRN    polyethylene glycol (MIRALAX) packet 17 g  17 g Oral DAILY PRN    ondansetron (ZOFRAN ODT) tablet 4 mg  4 mg Oral Q8H PRN    Or    ondansetron (ZOFRAN) injection 4 mg  4 mg IntraVENous Q6H PRN    insulin lispro (HUMALOG) injection   SubCUTAneous AC&HS    glucose chewable tablet 16 g  4 Tablet Oral PRN    dextrose (D50W) injection syrg 12.5-25 g  12.5-25 g IntraVENous PRN    glucagon (GLUCAGEN) injection 1 mg  1 mg IntraMUSCular PRN     Current Outpatient Medications   Medication Sig    azelastine (ASTELIN) 137 mcg (0.1 %) nasal spray 1 Britt by Both Nostrils route two (2) times daily as needed for Rhinitis. Use in each nostril as directed    simvastatin (ZOCOR) 20 mg tablet TAKE 1 TABLET BY MOUTH EVERY NIGHT    metFORMIN (GLUCOPHAGE) 500 mg tablet TAKE 1 TABLET BY MOUTH TWICE A DAY WITH FOOD    glipiZIDE (GLUCOTROL) 5 mg tablet TAKE 1 TABLET BY MOUTH EVERY DAY    glucose blood VI test strips (blood glucose test) strip Pt tests daily.   DX: E11.65, Please provide with one touch ultra test strips    amitriptyline (ELAVIL) 10 mg tablet Take 1 Tab by mouth nightly. Indications: sleep/anxiety (Patient not taking: Reported on 6/21/2021)    ramipriL (ALTACE) 10 mg capsule TAKE 2 CAPSULES BY MOUTH EVERY DAY    ALPRAZolam (XANAX) 0.5 mg tablet Take 1 Tab by mouth nightly as needed for Anxiety. Max Daily Amount: 0.5 mg.    Blood-Glucose Meter misc Pt tests daily. Dx: E11.65    glucose blood VI test strips (ACCU-CHEK MARY GRACE) strip Diagnosis E11.65. Pt is testing once daily. Review of Symptoms:  Review of Systems   Constitutional: Positive for malaise/fatigue. Negative for fever and weight loss. Respiratory: Positive for cough and shortness of breath. Cardiovascular: Positive for leg swelling and PND. Negative for chest pain and palpitations. Gastrointestinal: Negative for nausea. Musculoskeletal: Negative for myalgias. Neurological: Negative. Psychiatric/Behavioral: Negative. Physical Exam    Physical Exam  Vitals and nursing note reviewed. Constitutional:       Appearance: Normal appearance. Cardiovascular:      Rate and Rhythm: Tachycardia present. Heart sounds: No murmur heard. Pulmonary:      Breath sounds: Wheezing present. Musculoskeletal:         General: Swelling present. Skin:     General: Skin is warm and dry. Neurological:      Mental Status: She is alert and oriented to person, place, and time.    Psychiatric:         Mood and Affect: Mood normal.         Behavior: Behavior normal.          Cardiographics    Telemetry: normal sinus rhythm  ECG:  sinus tachycardia  Echocardiogram: Not done    Labs:   Recent Results (from the past 24 hour(s))   EKG, 12 LEAD, INITIAL    Collection Time: 06/22/21  9:35 AM   Result Value Ref Range    Ventricular Rate 107 BPM    Atrial Rate 107 BPM    P-R Interval 118 ms    QRS Duration 58 ms    Q-T Interval 320 ms    QTC Calculation (Bezet) 427 ms    Calculated P Axis 33 degrees    Calculated R Axis 23 degrees    Calculated T Axis 34 degrees Diagnosis       Sinus tachycardia  Low voltage QRS  Septal infarct , age undetermined  When compared with ECG of 10-ADOLFO-2020 23:03,  Septal infarct is now present     CBC WITH AUTOMATED DIFF    Collection Time: 06/22/21  9:37 AM   Result Value Ref Range    WBC 7.8 3.6 - 11.0 K/uL    RBC 3.97 3.80 - 5.20 M/uL    HGB 9.3 (L) 11.5 - 16.0 g/dL    HCT 32.7 (L) 35.0 - 47.0 %    MCV 82.4 80.0 - 99.0 FL    MCH 23.4 (L) 26.0 - 34.0 PG    MCHC 28.4 (L) 30.0 - 36.5 g/dL    RDW 17.5 (H) 11.5 - 14.5 %    PLATELET 297 318 - 793 K/uL    MPV 9.5 8.9 - 12.9 FL    NRBC 0.0 0  WBC    ABSOLUTE NRBC 0.00 0.00 - 0.01 K/uL    NEUTROPHILS 72 32 - 75 %    LYMPHOCYTES 15 12 - 49 %    MONOCYTES 9 5 - 13 %    EOSINOPHILS 3 0 - 7 %    BASOPHILS 1 0 - 1 %    IMMATURE GRANULOCYTES 0 0.0 - 0.5 %    ABS. NEUTROPHILS 5.6 1.8 - 8.0 K/UL    ABS. LYMPHOCYTES 1.2 0.8 - 3.5 K/UL    ABS. MONOCYTES 0.7 0.0 - 1.0 K/UL    ABS. EOSINOPHILS 0.2 0.0 - 0.4 K/UL    ABS. BASOPHILS 0.1 0.0 - 0.1 K/UL    ABS. IMM. GRANS. 0.0 0.00 - 0.04 K/UL    DF SMEAR SCANNED      RBC COMMENTS HYPOCHROMIA  PRESENT        RBC COMMENTS ANISOCYTOSIS  1+       METABOLIC PANEL, COMPREHENSIVE    Collection Time: 06/22/21  9:37 AM   Result Value Ref Range    Sodium 136 136 - 145 mmol/L    Potassium 4.5 3.5 - 5.1 mmol/L    Chloride 107 97 - 108 mmol/L    CO2 22 21 - 32 mmol/L    Anion gap 7 5 - 15 mmol/L    Glucose 152 (H) 65 - 100 mg/dL    BUN 19 6 - 20 MG/DL    Creatinine 1.26 (H) 0.55 - 1.02 MG/DL    BUN/Creatinine ratio 15 12 - 20      GFR est AA 50 (L) >60 ml/min/1.73m2    GFR est non-AA 41 (L) >60 ml/min/1.73m2    Calcium 8.4 (L) 8.5 - 10.1 MG/DL    Bilirubin, total 0.6 0.2 - 1.0 MG/DL    ALT (SGPT) 28 12 - 78 U/L    AST (SGOT) 32 15 - 37 U/L    Alk.  phosphatase 193 (H) 45 - 117 U/L    Protein, total 6.0 (L) 6.4 - 8.2 g/dL    Albumin 2.6 (L) 3.5 - 5.0 g/dL    Globulin 3.4 2.0 - 4.0 g/dL    A-G Ratio 0.8 (L) 1.1 - 2.2     CK W/ REFLX CKMB    Collection Time: 06/22/21  9:37 AM Result Value Ref Range     26 - 192 U/L   TROPONIN I    Collection Time: 06/22/21  9:37 AM   Result Value Ref Range    Troponin-I, Qt. <0.05 <0.05 ng/mL   NT-PRO BNP    Collection Time: 06/22/21  9:37 AM   Result Value Ref Range    NT pro-BNP 1,472 (H) <450 PG/ML   D DIMER    Collection Time: 06/22/21 10:45 AM   Result Value Ref Range    D-dimer 0.84 (H) 0.00 - 0.65 mg/L FEU        Assessment:     Assessment:         Hospital Problems  Date Reviewed: 12/15/2020        Codes Class Noted POA    Pericardial effusion ICD-10-CM: I31.3  ICD-9-CM: 423.9  6/22/2021 Unknown               Plan:     Pericardial effusion- probably metastatic. She is hemodynamically stable. No clinical evidence of tamponade. No indication for urgent pericardiocentesis. Check echo. Consider window if large effusion. Diurese as tolerated. Await cytology from pleural fluid. ? Hepatic lesion biopsy. Thank you for the consult. Will follow. Discussed with .     Padmini Hull MD

## 2021-06-22 NOTE — PROGRESS NOTES
Problem: Diabetes Self-Management  Goal: *Disease process and treatment process  Description: Define diabetes and identify own type of diabetes; list 3 options for treating diabetes. Outcome: Progressing Towards Goal  Goal: *Incorporating nutritional management into lifestyle  Description: Describe effect of type, amount and timing of food on blood glucose; list 3 methods for planning meals. Outcome: Progressing Towards Goal  Goal: *Incorporating physical activity into lifestyle  Description: State effect of exercise on blood glucose levels. Outcome: Progressing Towards Goal  Goal: *Developing strategies to promote health/change behavior  Description: Define the ABC's of diabetes; identify appropriate screenings, schedule and personal plan for screenings. Outcome: Progressing Towards Goal  Goal: *Using medications safely  Description: State effect of diabetes medications on diabetes; name diabetes medication taking, action and side effects. Outcome: Progressing Towards Goal  Goal: *Monitoring blood glucose, interpreting and using results  Description: Identify recommended blood glucose targets  and personal targets. Outcome: Progressing Towards Goal  Goal: *Prevention, detection, treatment of acute complications  Description: List symptoms of hyper- and hypoglycemia; describe how to treat low blood sugar and actions for lowering  high blood glucose level. Outcome: Progressing Towards Goal  Goal: *Prevention, detection and treatment of chronic complications  Description: Define the natural course of diabetes and describe the relationship of blood glucose levels to long term complications of diabetes.   Outcome: Progressing Towards Goal  Goal: *Developing strategies to address psychosocial issues  Description: Describe feelings about living with diabetes; identify support needed and support network  Outcome: Progressing Towards Goal  Goal: *Insulin pump training  Outcome: Progressing Towards Goal  Goal: *Sick day guidelines  Outcome: Progressing Towards Goal  Goal: *Patient Specific Goal (EDIT GOAL, INSERT TEXT)  Outcome: Progressing Towards Goal     Problem: Patient Education: Go to Patient Education Activity  Goal: Patient/Family Education  Outcome: Progressing Towards Goal     Problem: Falls - Risk of  Goal: *Absence of Falls  Description: Document [place] Fall Risk and appropriate interventions in the flowsheet. Outcome: Progressing Towards Goal  Note: Fall Risk Interventions:            Medication Interventions: Patient to call before getting OOB                   Problem: Patient Education: Go to Patient Education Activity  Goal: Patient/Family Education  Outcome: Progressing Towards Goal     Problem: Breathing Pattern - Ineffective  Goal: *Absence of hypoxia  Outcome: Progressing Towards Goal  Goal: *Use of effective breathing techniques  Outcome: Progressing Towards Goal  Goal: *PALLIATIVE CARE:  Alleviation of Dyspnea  Outcome: Progressing Towards Goal     Problem: Patient Education: Go to Patient Education Activity  Goal: Patient/Family Education  Outcome: Progressing Towards Goal     Problem: Falls - Risk of  Goal: *Absence of Falls  Description: Document [place] Fall Risk and appropriate interventions in the flowsheet. Outcome: Progressing Towards Goal  Note: Fall Risk Interventions:            Medication Interventions: Patient to call before getting OOB                   Problem: Patient Education: Go to Patient Education Activity  Goal: Patient/Family Education  Outcome: Progressing Towards Goal     Problem: Pressure Injury - Risk of  Goal: *Prevention of pressure injury  Description: Document [place] Scale and appropriate interventions in the flowsheet. Outcome: Progressing Towards Goal  Note: Pressure Injury Interventions: Activity Interventions: Increase time out of bed    Mobility Interventions: Turn and reposition approx.  every two hours(pillow and wedges)    Nutrition Interventions: Document food/fluid/supplement intake    Friction and Shear Interventions: HOB 30 degrees or less                Problem: Patient Education: Go to Patient Education Activity  Goal: Patient/Family Education  Outcome: Progressing Towards Goal

## 2021-06-22 NOTE — PROGRESS NOTES
Labs stable. Dm improved, a1c down to 6.8. cholesterol levels also improved. Kidney function is a bit worse. Make sure to limit nsaids (ibuprofen, aleve, advil, etc). Anemia is stable. Continue same meds.

## 2021-06-22 NOTE — H&P
Hospitalist Admission Note    NAME: Celestino Serrato   :  1945   MRN:  [id number]     Date/Time:  2021 2:52 PM    Patient PCP: Prabhjot Thurman, DO  ______________________________________________________________________  Given the patient's current clinical presentation, I have a high level of concern for decompensation if discharged from the emergency department. Complex decision making was performed, which includes reviewing the patient's available past medical records, laboratory results, and x-ray films. My assessment of this patient's clinical condition and my plan of care is as follows. Assessment / Plan:  Shortness of breath.   Left-sided pleural effusion POA  Pericardial effusion POA  History of renal cell carcinoma status post nephrectomy  Liver lesion concerning for metastatic disease  Likely left lower lobe pneumonia POA  Acute CHF, unclear whether systolic or diastolic    -Patient presented with worsening shortness of breath  -She is noted to have left-sided pleural and pericardial effusion on CT of the chest with left lower lobe consolidation  -Noted to have pericardial implant in a liver lesion suspicious of malignancy  -Patient has history of renal cell carcinoma status post right nephrectomy last year  -Pericardial and pleural effusion is highly concerning for malignant pleural effusion due to likely metastatic disease  -We will obtain thoracentesis and send for cytology, culture, pH, LDH in protein levels  -Based on the cytology report will obtain oncology consultation  -Empirically started on ceftriaxone and azithromycin due to presence of likely consolidation in the left lower lobe  -Obtain echocardiogram  -Clinically patient does not have a tamponade physiology  -Patient has bilateral lower extremity edema and presents with orthopnea  -Although most of the symptoms are attributed to pleural and pericardial effusion but patient also has peripheral edema  -We will start Lasix 40 mg IV twice daily  -We will continue to monitor clinically as with the pericardial effusion diuresis may result in tamponade although there is no tamponade clinically at this point    Hypertension  -Continue ACE inhibitor    Non-insulin-dependent diabetes  -Hold oral hypoglycemics and use sliding scale insulin for now      Code Status: full code  Surrogate Decision Maker:  Fritz Norman  DVT Prophylaxis: SCDs for now, Lovenox post thoracentesis  GI Prophylaxis: not indicated    Baseline: Independent      Subjective:   CHIEF COMPLAINT: Shortness of breath    HISTORY OF PRESENT ILLNESS:     Evia Dancer is a 68 y.o.  female with past medical history significant for renal cell carcinoma status post right nephrectomy last year, hypertension and non-insulin-dependent diabetes who presented to ED with a complains of worsening dyspnea associated with exertional shortness of breath and orthopnea. He denies any chest pain. Patient denies any fever or chills. She denies any chest pain. Reports her symptoms started as a deep cough few months ago and has been followed with progressive shortness of breath since then. She started noticing bilateral lower extreme edema about a week ago. She had a x-ray done in the ED which showed left-sided pleural effusion and left lower lobe consolidation. CT chest was ordered which showed a liver lesion, pericardial implant, pericardial effusion and pleural effusion with left lower lobe consolidation. It is highly suspicious of metastatic malignancy. Patient denies any nausea, vomiting, diarrhea, urinary consistency dysuria, heat or cold intolerance. We were asked to admit for work up and evaluation of the above problems.      Past Medical History:   Diagnosis Date    Chronic kidney disease     Diabetes ([facility] [address]     Type II    History of kidney cancer 06/2020    stage 2, s/p nephrectomy, no further tx necessary    Hypercholesterolemia     Hypertension     Psychiatric disorder     anxiety        Past Surgical History:   Procedure Laterality Date    COLONOSCOPY N/A 12/10/2019    COLONOSCOPY performed by Itzel Flynn MD at [facility]    HX CATARACT REMOVAL Bilateral 2015    HX CATARACT REMOVAL  01/09/2018    secondary cataracts removed    HX CHOLECYSTECTOMY  1993    HX DILATION AND CURETTAGE  2000    HX HYSTERECTOMY  2000    complete    HX NEPHRECTOMY Right 2020    stage 2 cancer, no further tx needed       Social History     Tobacco Use    Smoking status: Never Smoker    Smokeless tobacco: Never Used   Substance Use Topics    Alcohol use: No     Alcohol/week: 0.0 standard drinks        Family History   Adopted: Yes   Problem Relation Age of Onset   [employer] Cancer Daughter         lung    Cancer Son         colon   Reviewed  No Known Allergies     Prior to Admission medications    Medication Sig Start Date End Date Taking? Authorizing Provider   azelastine (ASTELIN) 137 mcg (0.1 %) nasal spray 1 [place] by Both Nostrils route two (2) times daily as needed for Rhinitis. Use in each nostril as directed 6/21/21   Richard KAUR III, DO   simvastatin (ZOCOR) 20 mg tablet TAKE 1 TABLET BY MOUTH EVERY NIGHT 5/31/21   Richard KAUR III, DO   metFORMIN (GLUCOPHAGE) 500 mg tablet TAKE 1 TABLET BY MOUTH TWICE A DAY WITH FOOD 5/28/21   Richard KAUR III, DO   glipiZIDE (GLUCOTROL) 5 mg tablet TAKE 1 TABLET BY MOUTH EVERY DAY 5/2/21   Joe Gomez III, DO   glucose blood VI test strips (blood glucose test) strip Pt tests daily. DX: E11.65, Please provide with one touch ultra test strips 1/4/21   Grace Gomez III, DO   amitriptyline (ELAVIL) 10 mg tablet Take 1 Tab by mouth nightly.  Indications: sleep/anxiety  Patient not taking: Reported on 6/21/2021 12/15/20   Anum Ortega NP   ramipriL (ALTACE) 10 mg capsule TAKE 2 CAPSULES BY MOUTH EVERY DAY 10/1/20   Richard Vail III, DO   ALPRAZolam (XANAX) 0.5 mg tablet Take 1 Tab by mouth nightly as needed for Anxiety. Max Daily Amount: 0.5 mg. 8/10/20   Kenrick KAUR III, DO   Blood-Glucose Meter misc Pt tests daily. Dx: E11.65 3/15/18   Jose Raman MD   glucose blood VI test strips (ACCU-CHEK MARY GRACE) strip Diagnosis E11.65. Pt is testing once daily. 3/12/18   Jose Raman MD       REVIEW OF SYSTEMS:     I am not able to complete the review of systems because:    The patient is intubated and sedated    The patient has altered mental status due to his acute medical problems    The patient has baseline aphasia from prior stroke(s)    The patient has baseline dementia and is not reliable historian    The patient is in acute medical distress and unable to provide information           Total of 12 systems reviewed as follows:       POSITIVE= underlined text  Negative = text not underlined  General:  fever, chills, sweats, generalized weakness, weight loss/gain,      loss of appetite   Eyes:    blurred vision, eye pain, loss of vision, double vision  ENT:    rhinorrhea, pharyngitis   Respiratory:   cough, sputum production, SOB, [place], wheezing, pleuritic pain   Cardiology:   chest pain, palpitations, orthopnea, PND, edema, syncope   Gastrointestinal:  abdominal pain , N/V, diarrhea, dysphagia, constipation, bleeding   Genitourinary:  frequency, urgency, dysuria, hematuria, incontinence   Muskuloskeletal :  arthralgia, myalgia, back pain  Hematology:  easy bruising, nose or gum bleeding, lymphadenopathy   Dermatological: rash, ulceration, pruritis, color change / jaundice  Endocrine:   hot flashes or polydipsia   Neurological:  headache, dizziness, confusion, focal weakness, paresthesia,     Speech difficulties, memory loss, gait difficulty  Psychological: Feelings of anxiety, depression, agitation    Objective:   VITALS:    Visit Vitals  BP (!) 142/72   Pulse 97   Temp 98.3 °F (36.8 °C)   Resp 18   Ht 5' 5\" (1.651 m)   Wt 80.1 kg (176 lb 9.4 oz)   SpO2 97%   BMI 29.39 kg/m²       PHYSICAL EXAM:    General:    Alert, cooperative, no distress, appears stated age. HEENT: Atraumatic, anicteric sclerae, pink conjunctivae     No oral ulcers, mucosa moist, throat clear, dentition fair  Neck:  Supple, symmetrical,  thyroid: non tender  Lungs:   Clear to auscultation bilaterally. Decreased air entry in left lower lung zone. no Wheezing or Rhonchi. No rales. Chest wall:  No tenderness  No Accessory muscle use. Heart:   Regular  rhythm,  No  murmur 2+ bilateral lower extremity edema  Abdomen:   Soft, non-tender. Not distended. Bowel sounds normal  Extremities: No cyanosis. No clubbing,      Skin turgor normal, Capillary refill normal, Radial dial pulse 2+  Skin:     Not pale. Not Jaundiced  No rashes   Psych:  Good insight. Not depressed. Not anxious or agitated. Neurologic: EOMs intact. No facial asymmetry. No aphasia or slurred speech. Symmetrical strength, Sensation grossly intact.  Alert and oriented X 4.     _______________________________________________________________________  Care Plan discussed with:    Comments   Patient x    Family  x   present at bedside   RN x    Care Manager                    Consultant:      _______________________________________________________________________  Expected  Disposition:   Home with Family    HH/PT/OT/RN    SNF/LTC    CHANTELLE    ________________________________________________________________________  TOTAL TIME:  39 Minutes    Critical Care Provided     Minutes non procedure based      Comments    x Reviewed previous records   >50% of visit spent in counseling and coordination of care x Discussion with patient and/or family and questions answered       ________________________________________________________________________  Signed: Andriy Dover MD    Procedures: see electronic medical records for all procedures/Xrays and details which were not copied into this note but were reviewed prior to creation of Plan. LAB DATA REVIEWED:    Recent Results (from the past 24 hour(s))   EKG, 12 LEAD, INITIAL    Collection Time: 06/22/21  9:35 AM   Result Value Ref Range    Ventricular Rate 107 BPM    Atrial Rate 107 BPM    P-R Interval 118 ms    QRS Duration 58 ms    Q-T Interval 320 ms    QTC Calculation (Bezet) 427 ms    Calculated P Axis 33 degrees    Calculated R Axis 23 degrees    Calculated T Axis 34 degrees    Diagnosis       Sinus tachycardia  Low voltage QRS  Septal infarct , age undetermined  When compared with ECG of 10-ADOLFO-2020 23:03,  Septal infarct is now present     CBC WITH AUTOMATED DIFF    Collection Time: 06/22/21  9:37 AM   Result Value Ref Range    WBC 7.8 3.6 - 11.0 K/uL    RBC 3.97 3.80 - 5.20 M/uL    HGB 9.3 (L) 11.5 - 16.0 g/dL    HCT 32.7 (L) 35.0 - 47.0 %    MCV 82.4 80.0 - 99.0 FL    MCH 23.4 (L) 26.0 - 34.0 PG    MCHC 28.4 (L) 30.0 - 36.5 g/dL    RDW 17.5 (H) 11.5 - 14.5 %    PLATELET 460 064 - 038 K/uL    MPV 9.5 8.9 - 12.9 FL    NRBC 0.0 0  WBC    ABSOLUTE NRBC 0.00 0.00 - 0.01 K/uL    NEUTROPHILS 72 32 - 75 %    LYMPHOCYTES 15 12 - 49 %    MONOCYTES 9 5 - 13 %    EOSINOPHILS 3 0 - 7 %    BASOPHILS 1 0 - 1 %    IMMATURE GRANULOCYTES 0 0.0 - 0.5 %    ABS. NEUTROPHILS 5.6 1.8 - 8.0 K/UL    ABS. LYMPHOCYTES 1.2 0.8 - 3.5 K/UL    ABS. MONOCYTES 0.7 0.0 - 1.0 K/UL    ABS. EOSINOPHILS 0.2 0.0 - 0.4 K/UL    ABS. BASOPHILS 0.1 0.0 - 0.1 K/UL    ABS. IMM.  GRANS. 0.0 0.00 - 0.04 K/UL    DF SMEAR SCANNED      RBC COMMENTS HYPOCHROMIA  PRESENT        RBC COMMENTS ANISOCYTOSIS  1+       METABOLIC PANEL, COMPREHENSIVE    Collection Time: 06/22/21  9:37 AM   Result Value Ref Range    Sodium 136 136 - 145 mmol/L    Potassium 4.5 3.5 - 5.1 mmol/L    Chloride 107 97 - 108 mmol/L    CO2 22 21 - 32 mmol/L    Anion gap 7 5 - 15 mmol/L    Glucose 152 (H) 65 - 100 mg/dL    BUN 19 6 - 20 MG/DL    Creatinine 1.26 (H) 0.55 - 1.02 MG/DL    BUN/Creatinine ratio 15 12 - 20      GFR est AA 50 (L) >60 ml/min/1.73m2    GFR est non-AA 41 (L) >60 ml/min/1.73m2    Calcium 8.4 (L) 8.5 - 10.1 MG/DL    Bilirubin, total 0.6 0.2 - 1.0 MG/DL    ALT (SGPT) 28 12 - 78 U/L    AST (SGOT) 32 15 - 37 U/L    Alk.  phosphatase 193 (H) 45 - 117 U/L    Protein, total 6.0 (L) 6.4 - 8.2 g/dL    Albumin 2.6 (L) 3.5 - 5.0 g/dL    Globulin 3.4 2.0 - 4.0 g/dL    A-G Ratio 0.8 (L) 1.1 - 2.2     CK W/ REFLX CKMB    Collection Time: 06/22/21  9:37 AM   Result Value Ref Range     26 - 192 U/L   TROPONIN I    Collection Time: 06/22/21  9:37 AM   Result Value Ref Range    Troponin-I, Qt. <0.05 <0.05 ng/mL   NT-PRO BNP    Collection Time: 06/22/21  9:37 AM   Result Value Ref Range    NT pro-BNP 1,472 (H) <450 PG/ML   D DIMER    Collection Time: 06/22/21 10:45 AM   Result Value Ref Range    D-dimer 0.84 (H) 0.00 - 0.65 mg/L FEU

## 2021-06-22 NOTE — REMOTE MONITORING
Called and spoke to [facility] regarding Sepsis bundle. For any questions or concerns, please feel free to call [phone]. Thank you! [facility]. Wen Vaughn RN, BSN.

## 2021-06-23 PROBLEM — J90 PLEURAL EFFUSION, LEFT: Status: ACTIVE | Noted: 2021-01-01

## 2021-06-23 NOTE — PROGRESS NOTES
[address] [place] [address]  [phone]      Cardiology Progress Note      6/23/2021 9:00AM    Admit Date: 6/22/2021    Admit Diagnosis:   Pericardial effusion [I31.3]    Subjective:     Pascale Galo has no further c/o CP, SOB, feeling much better today s/p thoracentesis yesterday. CX still pending from pleural fluids. Echo with normal and small pericardial effusion. Discussed with patient and .      Visit Vitals  /63   Pulse 94   Temp 98 °F (36.7 °C)   Resp 18   Ht 5' 5\" (1.651 m)   Wt 176 lb 9.4 oz (80.1 kg)   LMP  (LMP Unknown)   SpO2 97%   Breastfeeding No   BMI 29.39 kg/m²       Current Facility-Administered Medications   Medication Dose Route Frequency    cefTRIAXone (ROCEPHIN) 2 g in 0.9% sodium chloride (MBP/ADV) 50 mL MBP  2 g IntraVENous Q24H    azithromycin (ZITHROMAX) 500 mg in 0.9% sodium chloride 250 mL (VIAL-MATE)  500 mg IntraVENous Q24H    ALPRAZolam (XANAX) tablet 0.5 mg  0.5 mg Oral QHS PRN    azelastine (ASTELIN) 137mcg/spray nasal spray  1 Spray Both Nostrils BID PRN    lisinopriL (PRINIVIL, ZESTRIL) tablet 40 mg  40 mg Oral DAILY    atorvastatin (LIPITOR) tablet 10 mg  10 mg Oral DAILY    sodium chloride (NS) flush 5-40 mL  5-40 mL IntraVENous Q8H    sodium chloride (NS) flush 5-40 mL  5-40 mL IntraVENous PRN    acetaminophen (TYLENOL) tablet 650 mg  650 mg Oral Q6H PRN    Or    acetaminophen (TYLENOL) suppository 650 mg  650 mg Rectal Q6H PRN    polyethylene glycol (MIRALAX) packet 17 g  17 g Oral DAILY PRN    ondansetron (ZOFRAN ODT) tablet 4 mg  4 mg Oral Q8H PRN    Or    ondansetron (ZOFRAN) injection 4 mg  4 mg IntraVENous Q6H PRN    insulin lispro (HUMALOG) injection   SubCUTAneous AC&HS    glucose chewable tablet 16 g  4 Tablet Oral PRN    dextrose (D50W) injection syrg 12.5-25 g  12.5-25 g IntraVENous PRN    glucagon (GLUCAGEN) injection 1 mg  1 mg IntraMUSCular PRN    furosemide (LASIX) injection 40 mg  40 mg IntraVENous BID       Objective:      Physical Exam:  General Appearance:  WNWD older  female in no acute distress  Chest:   minimal crackles at bases  Cardiovascular:  Regular rate and rhythm, no murmur. Abdomen:   Soft, non-tender, bowel sounds are active. Extremities: +1` gillian LE edema   Skin:  Warm and dry. Data Review:   Recent Labs     06/23/21  0457 06/22/21  0937 06/21/21  1235   WBC 8.5 7.8 7.8   HGB 8.6* 9.3* 9.2*   HCT 29.5* 32.7* 32.8*    393 419*     Recent Labs     06/23/21  0457 06/22/21  0937 06/21/21  1235    136 142   K 4.5 4.5 5.8*    107 113*   CO2 21 22 23   * 152* 143*   BUN 19 19 21*   CREA 1.37* 1.26* 1.41*   CA 7.8* 8.4* 8.5   ALB 2.2* 2.6* 2.7*   TBILI 0.5 0.6 0.5   ALT 23 28 30       Recent Labs     06/22/21  5686   TROIQ <0.05         Intake/Output Summary (Last 24 hours) at 6/23/2021 1233  Last data filed at 6/23/2021 1150  Gross per 24 hour   Intake 590 ml   Output 1770 ml   Net -1180 ml        Telemetry: SR    Echo: 6/22/2021  Normal EF and small pericardial effusion      Assessment:     Active Problems:    Diabetes mellitus ([facility] [address] (7/15/2013)      Hypertension (7/15/2013)      Hyperlipidemia associated with type 2 diabetes mellitus ([facility] [address] (3/18/2019)      Pericardial effusion (6/22/2021)      Pleural effusion, left (6/23/2021)        Plan:     Pericardial effusion  Echo shows normal EF and small to moderate pericardial effusion, no need for pericardiocentesis   Awaiting cytology on pleural fluid from thoracentesis, suspect metastatic CA. Hx of renal cell CA with nephrectomy 2020, and questionable hepatic lesions  Diuresing fair, continues on lasix 40mg BID     PNA:  Continue on antibx therapy per hospitalist    Roxanne Jasmine [facility] Cardiology    6/23/2021         Patient seen, examined by me personally. Plan discussed as detailed. Agree with note as outlined by  NP with modifications as noted.  My independent physical exam reveals : Physical Exam  Vitals and nursing note reviewed. Cardiovascular:      Rate and Rhythm: Normal rate and regular rhythm. Heart sounds: No murmur heard. Pulmonary:      Effort: Pulmonary effort is normal.      Breath sounds: Normal breath sounds. Musculoskeletal:         General: Swelling present. Cervical back: Neck supple. Skin:     General: Skin is warm and dry. Neurological:      Mental Status: She is alert and oriented to person, place, and time. Psychiatric:         Mood and Affect: Mood normal.         Behavior: Behavior normal.          No additional findings noted. Agree with plan as outlined above with modifications as noted. Moderate effusion by echo. No tamponade. Await cytology from pleural fluid. Oncology to see. ? Hepatic lesion biopsy if amenable.     Nicolle Polk MD

## 2021-06-23 NOTE — PROGRESS NOTES
Transition of Care Plan:    RUR: 13%  Disposition: Home with Follow-up Appointment  Follow up appointments: PCP, Cardiology  DME needed: None  Transportation at Discharge:Pt's  Jyoti Huizar will transport at d/c.   [address] or means to access home:   Pt has assess to the home     IM Medicare letter: 2nd IM Letter needed  Caregiver Contact: Jyoti Huiazr-  [phone]  Discharge Caregiver contacted prior to discharge? CM will contact caregiver at d/c.     Reason for Admission:   Pericardial effusion                    RUR Score:   13%               PCP: First and Last name:   Kyung Meléndez,      Name of Practice:     Are you a current patient: Yes/No:  Yes   Approximate date of last visit:  6/21/2021   Can you participate in a virtual visit if needed: Yes    Do you (patient/family) have any concerns for transition/discharge? None              Plan for utilizing home health:   Pt has not had home health in the past. Pt is receptive to home health if needed. Current Advanced Directive/Advance Care Plan:  Full Code; Pt has an ACP on file. Advance Care Planning     General Advance Care Planning (ACP) Conversation      Date of Conversation: 6/23/2021  Conducted with: Patient with Decision Making Capacity    Healthcare Decision Maker:     Primary Decision Maker: Annette Farnsworth Spouse - [phone]  Content/Action Overview: Has ACP document(s) on file - reflects the patient's care preferences  Reviewed DNR/DNI and patient elects Full Code (Attempt Resuscitation)    Length of Voluntary ACP Conversation in minutes:  16 minutes        CM called pt's room via phone to discuss d/c plan. CM verified pt's demographics, insurance and PCP. Pt is a 67 y/o  female admitted [facility] 6/22/2021 for pericardial effusion. Pt's PCP is Dr. Raissa Rangel. Pt uses [facility] for Rx. Pt resides with her  in an one level home with 0 JUAN C.  Pt is independent with ADL's and IADL's. Pt does drive. No DME's. No HH, SNF or IPR in the past. Pt is FULL code status. Pt does have an ACP on file. Pt's  Annie Goldsmith will transport at d/c. Care Management Interventions  PCP Verified by CM: Yes (Pt's PCP is Dr. Chayito Saldivar. )  Palliative Care Criteria Met (RRAT>21 & CHF Dx)?: No  Mode of Transport at Discharge: Other (see comment) (Pt's  Annie Goldsmith will transport at d/c. )  Transition of Care Consult (CM Consult): Discharge Planning (Home with Home Health )  Discharge Durable Medical Equipment: No (No DME's)  Physical Therapy Consult: No  Occupational Therapy Consult: No  Speech Therapy Consult: No  Current Support Network: Lives with Spouse, Own Home (Pt resides with her  in an one level home with 0 JUAN C. )  Confirm Follow Up Transport: Self (Pt does drive)  [address] Provided?: No  Discharge Location  Discharge Placement:  (Pt's  Annie Goldsmith will transport at d/c. )    CM will continue to follow patient for discharge planning needs and arrange for services as deemed necessary.     Ephraim Eldridge [address] [place] [address]  [phone]

## 2021-06-23 NOTE — PROGRESS NOTES
Problem: Diabetes Self-Management  Goal: *Disease process and treatment process  Description: Define diabetes and identify own type of diabetes; list 3 options for treating diabetes. Outcome: Progressing Towards Goal  Goal: *Incorporating nutritional management into lifestyle  Description: Describe effect of type, amount and timing of food on blood glucose; list 3 methods for planning meals. Outcome: Progressing Towards Goal  Goal: *Incorporating physical activity into lifestyle  Description: State effect of exercise on blood glucose levels. Outcome: Progressing Towards Goal  Goal: *Developing strategies to promote health/change behavior  Description: Define the ABC's of diabetes; identify appropriate screenings, schedule and personal plan for screenings. Outcome: Progressing Towards Goal  Goal: *Using medications safely  Description: State effect of diabetes medications on diabetes; name diabetes medication taking, action and side effects. Outcome: Progressing Towards Goal  Goal: *Monitoring blood glucose, interpreting and using results  Description: Identify recommended blood glucose targets  and personal targets. Outcome: Progressing Towards Goal  Goal: *Prevention, detection, treatment of acute complications  Description: List symptoms of hyper- and hypoglycemia; describe how to treat low blood sugar and actions for lowering  high blood glucose level. Outcome: Progressing Towards Goal  Goal: *Prevention, detection and treatment of chronic complications  Description: Define the natural course of diabetes and describe the relationship of blood glucose levels to long term complications of diabetes.   Outcome: Progressing Towards Goal  Goal: *Developing strategies to address psychosocial issues  Description: Describe feelings about living with diabetes; identify support needed and support network  Outcome: Progressing Towards Goal  Goal: *Insulin pump training  Outcome: Progressing Towards Goal  Goal: *Sick day guidelines  Outcome: Progressing Towards Goal  Goal: *Patient Specific Goal (EDIT GOAL, INSERT TEXT)  Outcome: Progressing Towards Goal     Problem: Patient Education: Go to Patient Education Activity  Goal: Patient/Family Education  Outcome: Progressing Towards Goal     Problem: Falls - Risk of  Goal: *Absence of Falls  Description: Document Thomas Lai Fall Risk and appropriate interventions in the flowsheet. Outcome: Progressing Towards Goal  Note: Fall Risk Interventions:            Medication Interventions: Bed/chair exit alarm, Patient to call before getting OOB, Teach patient to arise slowly                   Problem: Patient Education: Go to Patient Education Activity  Goal: Patient/Family Education  Outcome: Progressing Towards Goal     Problem: Falls - Risk of  Goal: *Absence of Falls  Description: Document Verfidel Lai Fall Risk and appropriate interventions in the flowsheet. Outcome: Progressing Towards Goal  Note: Fall Risk Interventions:            Medication Interventions: Bed/chair exit alarm, Patient to call before getting OOB, Teach patient to arise slowly                   Problem: Patient Education: Go to Patient Education Activity  Goal: Patient/Family Education  Outcome: Progressing Towards Goal     Problem: Breathing Pattern - Ineffective  Goal: *Absence of hypoxia  Outcome: Progressing Towards Goal  Goal: *Use of effective breathing techniques  Outcome: Progressing Towards Goal  Goal: *PALLIATIVE CARE:  Alleviation of Dyspnea  Outcome: Progressing Towards Goal     Problem: Patient Education: Go to Patient Education Activity  Goal: Patient/Family Education  Outcome: Progressing Towards Goal     Problem: Pressure Injury - Risk of  Goal: *Prevention of pressure injury  Description: Document Deshawn Scale and appropriate interventions in the flowsheet. Outcome: Progressing Towards Goal  Note: Pressure Injury Interventions:             Activity Interventions: Increase time out of bed, Pressure redistribution bed/mattress(bed type), PT/OT evaluation    Mobility Interventions: HOB 30 degrees or less, Pressure redistribution bed/mattress (bed type), PT/OT evaluation    Nutrition Interventions: Document food/fluid/supplement intake    Friction and Shear Interventions: Apply protective barrier, creams and emollients, HOB 30 degrees or less, Foam dressings/transparent film/skin sealants, Sit at 90-degree angle, Minimize layers                Problem: Patient Education: Go to Patient Education Activity  Goal: Patient/Family Education  Outcome: Progressing Towards Goal

## 2021-06-23 NOTE — PROGRESS NOTES
Pt with possible vagal episode during IV insertion. Pt complained of dizziness and nausea. BP was checked and was SBP 68. Pt then vomited x1. Repeat BPs have been SBP: 101,115,128. Pt also states she feels better and declined zofran. MD notified.

## 2021-06-23 NOTE — CONSULTS
[facility] at [address], [place], [address] [facility]  W: [phone] F: [phone]      Reason for Visit:   Jersey Beverly is a 68 y.o. female who is seen in consultation at the request of Dr. Margaux Martinez for evaluation of history of renal cell carcinoma, now with suspected metastasis . Hematology / Oncology Treatment History:     Hematological/Oncological Diagnosis: Renal Cell Carcinoma, Clear cell type    Date of Diagnosis: 8/2020    Treatment course:   1) s/p Nephrectomy    History of Present Illness:       Very pleasant 68year old female with past medical history notable for hypertension, DM2, hyperlipidemia and a recent history of early stage renal cell carcinoma, s/p nephrectomy in August of 2020, presented to the ED with complaints of shortness of breath, with subsequent imaging revealing large left sided pleural effusion. The patient had thoracentesis on 6/22 with about 800 cc drained. Cytology is pending. She also has radiographic evidence of pericardial effusion but without signs of tamponade. On interview, she has marked symptom improvement since thoracentesis. She reports that she has been independent with her ADLs and has not had any new constitutional symptoms that would make her concerned with malignancy.l  She has had worsening cough over the last few months, but noticed lower extremity edema last week as well. Positive ROS findings include: Cough, shortness of breath, edema  Review of Systems: A complete review of systems was obtained, negative except as described above.     Past Medical History:   Diagnosis Date    Chronic kidney disease     Diabetes ([facility] [address]     Type II    History of kidney cancer 06/2020    stage 2, s/p nephrectomy, no further tx necessary    Hypercholesterolemia     Hypertension     Pleural effusion, left 6/23/2021    Psychiatric disorder     anxiety      Past Surgical History:   Procedure Laterality Date    COLONOSCOPY N/A 12/10/2019    COLONOSCOPY performed by Nilsa Montiel MD at [facility] ENDOSCOPY    HX CATARACT REMOVAL Bilateral 2015    HX CATARACT REMOVAL  01/09/2018    secondary cataracts removed    HX CHOLECYSTECTOMY  1993    HX DILATION AND CURETTAGE  2000    HX HYSTERECTOMY  2000    complete    HX NEPHRECTOMY Right 2020    stage 2 cancer, no further tx needed      Social History     Tobacco Use    Smoking status: Never Smoker    Smokeless tobacco: Never Used   Substance Use Topics    Alcohol use: No     Alcohol/week: 0.0 standard drinks      Family History   Adopted: Yes   Problem Relation Age of Onset    Cancer Daughter         lung    Cancer Son         colon     Current Facility-Administered Medications   Medication Dose Route Frequency    cefTRIAXone (ROCEPHIN) 2 g in 0.9% sodium chloride (MBP/ADV) 50 mL MBP  2 g IntraVENous Q24H    azithromycin (ZITHROMAX) 500 mg in 0.9% sodium chloride 250 mL (VIAL-MATE)  500 mg IntraVENous Q24H    ALPRAZolam (XANAX) tablet 0.5 mg  0.5 mg Oral QHS PRN    azelastine (ASTELIN) 137mcg/spray nasal spray  1 Spray Both Nostrils BID PRN    lisinopriL (PRINIVIL, ZESTRIL) tablet 40 mg  40 mg Oral DAILY    atorvastatin (LIPITOR) tablet 10 mg  10 mg Oral DAILY    sodium chloride (NS) flush 5-40 mL  5-40 mL IntraVENous Q8H    sodium chloride (NS) flush 5-40 mL  5-40 mL IntraVENous PRN    acetaminophen (TYLENOL) tablet 650 mg  650 mg Oral Q6H PRN    Or    acetaminophen (TYLENOL) suppository 650 mg  650 mg Rectal Q6H PRN    polyethylene glycol (MIRALAX) packet 17 g  17 g Oral DAILY PRN    ondansetron (ZOFRAN ODT) tablet 4 mg  4 mg Oral Q8H PRN    Or    ondansetron (ZOFRAN) injection 4 mg  4 mg IntraVENous Q6H PRN    insulin lispro (HUMALOG) injection   SubCUTAneous AC&HS    glucose chewable tablet 16 g  4 Tablet Oral PRN    dextrose (D50W) injection syrg 12.5-25 g  12.5-25 g IntraVENous PRN    glucagon (GLUCAGEN) injection 1 mg  1 mg IntraMUSCular PRN    furosemide (LASIX) injection 40 mg  40 mg IntraVENous BID      No Known Allergies         Physical Exam:     Visit Vitals  BP (!) 128/58   Pulse 98   Temp 98.2 °F (36.8 °C)   Resp 26   Ht 5' 5\" (1.651 m)   Wt 176 lb 9.4 oz (80.1 kg)   LMP  (LMP Unknown)   SpO2 98%   Breastfeeding No   BMI 29.39 kg/m²     ECOG PS: 1  General: alert, cooperative, no distress   Mental  status: normal mood, behavior, speech, dress, motor activity, and thought processes, able to follow commands   HENT: NCAT   Neck: no visualized mass   Resp: no respiratory distress   Neuro: no gross deficits   Skin: no discoloration or lesions of concern on visible areas   Psychiatric: normal affect, consistent with stated mood, no evidence of hallucinations           Results:     Lab Results   Component Value Date/Time    WBC 8.5 06/23/2021 04:57 AM    HGB 8.6 (L) 06/23/2021 04:57 AM    HCT 29.5 (L) 06/23/2021 04:57 AM    PLATELET 098 60/37/8149 04:57 AM    MCV 81.3 06/23/2021 04:57 AM    ABS. NEUTROPHILS 5.4 06/23/2021 04:57 AM     Lab Results   Component Value Date/Time    Sodium 136 06/23/2021 04:57 AM    Potassium 4.5 06/23/2021 04:57 AM    Chloride 107 06/23/2021 04:57 AM    CO2 21 06/23/2021 04:57 AM    Glucose 142 (H) 06/23/2021 04:57 AM    BUN 19 06/23/2021 04:57 AM    Creatinine 1.37 (H) 06/23/2021 04:57 AM    GFR est AA 45 (L) 06/23/2021 04:57 AM    GFR est non-AA 37 (L) 06/23/2021 04:57 AM    Calcium 7.8 (L) 06/23/2021 04:57 AM    Glucose (POC) 127 (H) 06/23/2021 03:54 PM    Creatinine (POC) 0.8 07/22/2020 12:36 PM     Lab Results   Component Value Date/Time    Bilirubin, total 0.5 06/23/2021 04:57 AM    ALT (SGPT) 23 06/23/2021 04:57 AM    Alk.  phosphatase 168 (H) 06/23/2021 04:57 AM    Protein, total 5.5 (L) 06/23/2021 04:57 AM    Albumin 2.2 (L) 06/23/2021 04:57 AM    Globulin 3.3 06/23/2021 04:57 AM         CT abdomen/pelvis dated 6/23/21- notable for multiple subcm liver lesions, greatest of which is 1 cm in segment 6. Ground glass opacities in bilateral lungs, moderate pericardial effusion. CT thorax without contrast 6/22/21-  Thyroid nodule,2.6 cm x 1.0 cm -- somewhat asymetric. Possible pericardial implant with moderate pericardial effusion. Left pleural transudate. Left lower lobe consolidation, right lower lobe patchy consolidation. Right hepatic segment 7 lesion      Assessment     # History of early stage Renal Cell Carcinoma, now with concern for widespread metastatic disease    - Today I discussed the radiographic findings of liver lesions, pericardial and pleural fluid changes, as well as ground glass changes in the lungs with the patient. We reviewed how these findings are highly suspicious for malignancy. - Pathology from thoracentesis is pending at this time. - If pleural fluid is non-diagnostic, would recommend liver biopsy for definitive diagnosis. - We briefly reviewed treatment options and planning should the patient indeed have recurrence of her previous renal cell carcinoma. We discussed the role of immunotherapy with oral tyrosine kinase inhibitors as front line treatment for metastatic RCC. Further discussion and review of side effect profile of treatments can occur in the outpatient setting. The patient is agreeable to seek treatment should her cytology show recurrence of renal cell carcinoma. - Staging workup should include brain imaging in this setting should she indeed have positive pleural fluid findings. - Will plan for outpatient follow up within about 1 week after acute care discharge. Recommendations:     >> If pleural fluid negative for malignancy, would seek liver biopsy. This can be done as outpatient if needed. >> Staging workup should include brain imaging with MRI brain  >> Will plan for outpatient clinic visit within 1 week of acute care discharge.      Signed By: Villa Chambrelain MD      Attending [address] [facility] [address]

## 2021-06-23 NOTE — PROGRESS NOTES
06/22/21 1924   Vital Signs   Temp 98.4 °F (36.9 °C)   Temp Source Oral   Pulse (Heart Rate) (!) 109   Heart Rate Source Monitor   Cardiac Rhythm Sinus Rhythm   Resp Rate 24   O2 Sat (%) 99 %   Level of Consciousness Alert (0)   /70   MAP (Calculated) 90   BP 1 Method Automatic   BP 1 Location Left upper arm   BP Patient Position At rest   MEWS Score 3   Pain 1   Pain Scale 1 Numeric (0 - 10)   Pain Intensity 1 0   Patient Stated Pain Goal 0   Pt has had tachycardia, MD aware.

## 2021-06-23 NOTE — PROGRESS NOTES
End of Shift Note    Bedside shift change report given to Jeanna Clemons (oncoming nurse) by Sanjiv Munson (offgoing nurse). Report included the following information SBAR    Shift worked:  night     Shift summary and any significant changes:      none     Concerns for physician to address:        Zone phone for oncoming shift:           Activity:  Activity Level: Up with Assistance  Number times ambulated in hallways past shift: 0  Number of times OOB to chair past shift: 0    Cardiac:   Cardiac Monitoring: Yes      Cardiac Rhythm: Sinus Rhythm    Access:   Current line(s): PIV     Genitourinary:   Urinary status: voiding    Respiratory:   O2 Device: None (Room air)  Chronic home O2 use?: NO  Incentive spirometer at bedside: NO     GI:     Current diet:  ADULT DIET Regular; 4 carb choices (60 gm/meal)  Passing flatus: YES  Tolerating current diet: YES       Pain Management:   Patient states pain is manageable on current regimen: YES    Skin:  Deshawn Score: 18  Interventions: turn team    Patient Safety:  Fall Score:  Total Score: 1  Interventions: bed/chair alarm       Length of Stay:  Expected LOS: - - -  Actual LOS: 1      Sanjiv Munson

## 2021-06-23 NOTE — PROGRESS NOTES
Problem: Diabetes Self-Management  Goal: *Disease process and treatment process  Description: Define diabetes and identify own type of diabetes; list 3 options for treating diabetes. Outcome: Progressing Towards Goal  Goal: *Incorporating nutritional management into lifestyle  Description: Describe effect of type, amount and timing of food on blood glucose; list 3 methods for planning meals. Outcome: Progressing Towards Goal  Goal: *Incorporating physical activity into lifestyle  Description: State effect of exercise on blood glucose levels. Outcome: Progressing Towards Goal  Goal: *Developing strategies to promote health/change behavior  Description: Define the ABC's of diabetes; identify appropriate screenings, schedule and personal plan for screenings. Outcome: Progressing Towards Goal  Goal: *Using medications safely  Description: State effect of diabetes medications on diabetes; name diabetes medication taking, action and side effects. Outcome: Progressing Towards Goal  Goal: *Monitoring blood glucose, interpreting and using results  Description: Identify recommended blood glucose targets  and personal targets. Outcome: Progressing Towards Goal  Goal: *Prevention, detection, treatment of acute complications  Description: List symptoms of hyper- and hypoglycemia; describe how to treat low blood sugar and actions for lowering  high blood glucose level. Outcome: Progressing Towards Goal  Goal: *Prevention, detection and treatment of chronic complications  Description: Define the natural course of diabetes and describe the relationship of blood glucose levels to long term complications of diabetes.   Outcome: Progressing Towards Goal  Goal: *Developing strategies to address psychosocial issues  Description: Describe feelings about living with diabetes; identify support needed and support network  Outcome: Progressing Towards Goal  Goal: *Insulin pump training  Outcome: Progressing Towards Goal  Goal: *Sick day guidelines  Outcome: Progressing Towards Goal  Goal: *Patient Specific Goal (EDIT GOAL, INSERT TEXT)  Outcome: Progressing Towards Goal     Problem: Patient Education: Go to Patient Education Activity  Goal: Patient/Family Education  Outcome: Progressing Towards Goal     Problem: Falls - Risk of  Goal: *Absence of Falls  Description: Document Viry Moffett Fall Risk and appropriate interventions in the flowsheet. Outcome: Progressing Towards Goal  Note: Fall Risk Interventions:            Medication Interventions: Assess postural VS orthostatic hypotension, Bed/chair exit alarm                   Problem: Patient Education: Go to Patient Education Activity  Goal: Patient/Family Education  Outcome: Progressing Towards Goal     Problem: Breathing Pattern - Ineffective  Goal: *Absence of hypoxia  Outcome: Progressing Towards Goal  Goal: *Use of effective breathing techniques  Outcome: Progressing Towards Goal  Goal: *PALLIATIVE CARE:  Alleviation of Dyspnea  Outcome: Progressing Towards Goal     Problem: Patient Education: Go to Patient Education Activity  Goal: Patient/Family Education  Outcome: Progressing Towards Goal     Problem: Falls - Risk of  Goal: *Absence of Falls  Description: Document Juanita Fall Risk and appropriate interventions in the flowsheet. Outcome: Progressing Towards Goal  Note: Fall Risk Interventions:            Medication Interventions: Assess postural VS orthostatic hypotension, Bed/chair exit alarm                   Problem: Patient Education: Go to Patient Education Activity  Goal: Patient/Family Education  Outcome: Progressing Towards Goal     Problem: Pressure Injury - Risk of  Goal: *Prevention of pressure injury  Description: Document Deshawn Scale and appropriate interventions in the flowsheet.   Outcome: Progressing Towards Goal     Problem: Patient Education: Go to Patient Education Activity  Goal: Patient/Family Education  Outcome: Progressing Towards Goal

## 2021-06-23 NOTE — PROGRESS NOTES
Hospitalist Progress Note    NAME: Cherry Hein   :  1945   MRN:  [id number]       Assessment / Plan:    Shortness of breath. Left-sided pleural effusion POA  Pericardial effusion POA  History of renal cell carcinoma status post nephrectomy  Liver lesion concerning for metastatic disease  Likely left lower lobe pneumonia POA  Acute CHF, unclear whether systolic or diastolic     -Patient presented with worsening shortness of breath  -She is noted to have left-sided pleural and pericardial effusion on CT of the chest with left lower lobe consolidation  -Noted to have pericardial implant in a liver lesion suspicious of malignancy  -Patient has history of renal cell carcinoma status post right nephrectomy last year  -Pericardial and pleural effusion is highly concerning for malignant pleural effusion due to likely metastatic disease  -Status post thoracentesis  with 800 cc drained. Fluids looks transudate. I did not see wounds were sent for malignant cells  Given her significant history of malignancy and liver lesions concerning for metastatic disease, will consult oncology.  -Empirically started on ceftriaxone and azithromycin due to presence of likely consolidation in the left lower lobe, continue for now  -Echo pending  -Clinically patient does not have a tamponade physiology  -Patient has bilateral lower extremity edema and presents with orthopnea  -Although most of the symptoms are attributed to pleural and pericardial effusion but patient also has peripheral edema  -Started on Lasix 40 mg IV twice daily, continue  -continue to monitor clinically as with the pericardial effusion diuresis may result in tamponade although there is no tamponade clinically at this point     Hypertension  -Continue ACE inhibitor     Non-insulin-dependent diabetes  -Hold oral hypoglycemics and use sliding scale insulin for now       25.0 - 29.9 Overweight / Body mass index is 29.39 kg/m².     Estimated discharge date: June 26  Barriers: Awaiting echo, clinical improvement, oncology eval, cytology from thoracentesis fluids    Code status: Full  Prophylaxis: SCD's  Recommended Disposition: Home w/Family     Subjective:     Chief Complaint / Reason for Physician Visit  Discussed with RN events overnight. Patient was seen and examined. No acute events overnight. \"Feeling okay\"    Patient's wife at bedside, all questions were answered. Review of Systems:  Symptom Y/N Comments  Symptom Y/N Comments   Fever/Chills n   Chest Pain n    Poor Appetite    Edema y    Cough n   Abdominal Pain n    Sputum    Joint Pain     SOB/NEWTON y   Pruritis/Rash     Nausea/vomit n   Tolerating PT/OT     Diarrhea    Tolerating Diet y    Constipation    Other       Could NOT obtain due to:          Objective:     VITALS:   Last 24hrs VS reviewed since prior progress note. Most recent are:  Patient Vitals for the past 24 hrs:   Temp Pulse Resp BP SpO2   06/23/21 1223    114/63    06/23/21 1059 98 °F (36.7 °C) 94 18 114/63 97 %   06/23/21 0736 97.9 °F (36.6 °C) 100 18 123/61 94 %   06/23/21 0300 97.9 °F (36.6 °C) 97 17 113/62 97 %   06/22/21 2251 97.5 °F (36.4 °C) 100 17 115/70 95 %   06/22/21 1924 98.4 °F (36.9 °C) (!) 109 24 130/70 99 %   06/22/21 1600  (!) 104      06/22/21 1517  (!) 105 20  100 %   06/22/21 1450  98 18 (!) 162/77 97 %   06/22/21 1415  (!) 107 20 (!) 148/72 99 %       Intake/Output Summary (Last 24 hours) at 6/23/2021 1315  Last data filed at 6/23/2021 1248  Gross per 24 hour   Intake 1490 ml   Output 2070 ml   Net -580 ml        I had a face to face encounter and independently examined this patient on 6/23/2021, as outlined below:  PHYSICAL EXAM:  General: WD, WN. Alert, cooperative, no acute distress    EENT:  EOMI. Anicteric sclerae. MMM  Resp:  CTA bilaterally, no wheezing or rales. No accessory muscle use  CV:  Regular  rhythm,  +2 edema  GI:  Soft, Non distended, Non tender.   +Bowel sounds  Neurologic: Alert and oriented X 3, normal speech,   Psych:   Good insight. Not anxious nor agitated  Skin:  No rashes. No jaundice    Reviewed most current lab test results and cultures  YES  Reviewed most current radiology test results   YES  Review and summation of old records today    NO  Reviewed patient's current orders and MAR    YES  PMH/SH reviewed - no change compared to H&P  ________________________________________________________________________  Care Plan discussed with:    Comments   Patient x    Family  x    RN x    Care Manager x    Consultant                       x Multidiciplinary team rounds were held today with , nursing, pharmacist and clinical coordinator. Patient's plan of care was discussed; medications were reviewed and discharge planning was addressed. ________________________________________________________________________  Total NON critical care TIME:  36   Minutes    Total CRITICAL CARE TIME Spent:   Minutes non procedure based      Comments   >50% of visit spent in counseling and coordination of care     ________________________________________________________________________  Clau Covington MD     Procedures: see electronic medical records for all procedures/Xrays and details which were not copied into this note but were reviewed prior to creation of Plan. LABS:  I reviewed today's most current labs and imaging studies.   Pertinent labs include:  Recent Labs     06/23/21 0457 06/22/21  0937 06/21/21  1235   WBC 8.5 7.8 7.8   HGB 8.6* 9.3* 9.2*   HCT 29.5* 32.7* 32.8*    393 419*     Recent Labs     06/23/21  0457 06/22/21  0937 06/21/21  1235    136 142   K 4.5 4.5 5.8*    107 113*   CO2 21 22 23   * 152* 143*   BUN 19 19 21*   CREA 1.37* 1.26* 1.41*   CA 7.8* 8.4* 8.5   ALB 2.2* 2.6* 2.7*   TBILI 0.5 0.6 0.5   ALT 23 28 30       Signed: Clau Covington MD

## 2021-06-24 NOTE — PROGRESS NOTES
Problem: Diabetes Self-Management  Goal: *Disease process and treatment process  Description: Define diabetes and identify own type of diabetes; list 3 options for treating diabetes. Outcome: Progressing Towards Goal  Goal: *Incorporating nutritional management into lifestyle  Description: Describe effect of type, amount and timing of food on blood glucose; list 3 methods for planning meals. Outcome: Progressing Towards Goal  Goal: *Incorporating physical activity into lifestyle  Description: State effect of exercise on blood glucose levels. Outcome: Progressing Towards Goal  Goal: *Developing strategies to promote health/change behavior  Description: Define the ABC's of diabetes; identify appropriate screenings, schedule and personal plan for screenings. Outcome: Progressing Towards Goal  Goal: *Using medications safely  Description: State effect of diabetes medications on diabetes; name diabetes medication taking, action and side effects. Outcome: Progressing Towards Goal  Goal: *Prevention, detection, treatment of acute complications  Description: List symptoms of hyper- and hypoglycemia; describe how to treat low blood sugar and actions for lowering  high blood glucose level. Outcome: Progressing Towards Goal  Goal: *Prevention, detection and treatment of chronic complications  Description: Define the natural course of diabetes and describe the relationship of blood glucose levels to long term complications of diabetes.   Outcome: Progressing Towards Goal

## 2021-06-24 NOTE — PROGRESS NOTES
Problem: Falls - Risk of  Goal: *Absence of Falls  Description: Document [place] Fall Risk and appropriate interventions in the flowsheet. Outcome: Progressing Towards Goal  Note: Fall Risk Interventions:            Medication Interventions: Bed/chair exit alarm, Patient to call before getting OOB, Teach patient to arise slowly                   Problem: Pressure Injury - Risk of  Goal: *Prevention of pressure injury  Description: Document Deshawn Scale and appropriate interventions in the flowsheet. Outcome: Progressing Towards Goal  Note: Pressure Injury Interventions:             Activity Interventions: Increase time out of bed    Mobility Interventions: Chair cushion, HOB 30 degrees or less    Nutrition Interventions: Document food/fluid/supplement intake    Friction and Shear Interventions: Apply protective barrier, creams and emollients, Lift sheet, Minimize layers

## 2021-06-24 NOTE — PROGRESS NOTES
0700: Bedside shift change report given to Stanton Alvarez (oncoming nurse) by Claudy Oh (offgoing nurse). Report included the following information SBAR and Kardex. 0940: Pt left for Echo in stretcher with telebox. 1015: Pt returned to room from Echo.

## 2021-06-24 NOTE — PROGRESS NOTES
End of Shift Note    Bedside shift change report given to Alonzo Parker RN (oncoming nurse) by Leyla Walton RN (offgoing nurse). Report included the following information SBAR    Shift worked:  night       Shift summary and any significant changes:    Patient doesn't want to be stuck anymore and is requesting ultrasound guided. Was only able to receive CBC labs      Concerns for physician to address:       Zone phone for oncoming shift:          Activity:  Activity Level: Up with Assistance  Number times ambulated in hallways past shift: 0  Number of times OOB to bathroom past shift: 1    Cardiac:   Cardiac Monitoring: Yes      Cardiac Rhythm: Sinus Tach    Access:   Current line(s): PIV     Genitourinary:   Urinary status: voiding    Respiratory:   O2 Device: None (Room air)  Chronic home O2 use?: NO  Incentive spirometer at bedside: YES     GI:  Last Bowel Movement Date: 06/20/21  Current diet:  ADULT DIET Regular; 4 carb choices (60 gm/meal)  Passing flatus: YES  Tolerating current diet: YES       Pain Management:   Patient states pain is manageable on current regimen: YES    Skin:  Deshawn Score: 18  Interventions: increase time out of bed    Patient Safety:  Fall Score:  Total Score: 1  Interventions: bed/chair alarm, gripper socks, pt to call before getting OOB and stay with me (per policy)       Length of Stay:  Expected LOS: - - -  Actual LOS: 1      Leyla Walton RN

## 2021-06-24 NOTE — PROGRESS NOTES
[address] INTERDISCIPLINARY ROUNDS    Cardiopulmonary Care Interdisciplinary Rounds were held today to discuss patient's plan of care and outcomes. The following members were present: NP/Physician, Pharmacy, Nursing and Case Management. PLAN OF CARE:   Continue current treatment plan, plan to discharge tomorrow.      Expected Length of Stay:  - - -

## 2021-06-24 NOTE — PROGRESS NOTES
Hospitalist Progress Note    NAME: Jose Nina   :  1945   MRN:  [id number]       Assessment / Plan:    Shortness of breath. Left-sided pleural effusion POA  Pericardial effusion POA  History of renal cell carcinoma status post nephrectomy  Liver lesion concerning for metastatic disease  Likely left lower lobe pneumonia POA  Acute diastolic heart failure, POA     -Patient presented with worsening shortness of breath  -She is noted to have left-sided pleural and pericardial effusion on CT of the chest with left lower lobe consolidation  -Noted to have pericardial implant in a liver lesion suspicious of malignancy  -Patient has history of renal cell carcinoma status post right nephrectomy last year  -Pericardial and pleural effusion is highly concerning for malignant pleural effusion due to likely metastatic disease  -Status post thoracentesis  with 800 cc drained. Fluids looks transudate. I did not see wounds were sent for malignant cells  Given her significant history of malignancy and liver lesions concerning for metastatic disease, oncology was called. Input is appreciated. Cytology from thoracentesis fluid pending  CT abdomen Multiple small hypodense lesions in the liver as above, new since 2020,  indeterminate but suspicious for metastatic disease.  Consider further evaluation  with MRI abdomen, which may provide better sensitivity for lesions  -Empirically started on ceftriaxone and azithromycin due to presence of likely consolidation in the left lower lobe, continue for now  -Echo resulted with normal EF and stage I diastolic heart failure  -Clinically patient does not have a tamponade physiology  -Patient has bilateral lower extremity edema and presents with orthopnea  -Although most of the symptoms are attributed to pleural and pericardial effusion but patient also has peripheral edema  -Started on Lasix 40 mg IV twice daily, continue  -continue to monitor clinically as with the pericardial effusion diuresis may result in tamponade although there is no tamponade clinically at this point     Hypertension  -Continue ACE inhibitor     Non-insulin-dependent diabetes  -Hold oral hypoglycemics and use sliding scale insulin for now       25.0 - 29.9 Overweight / Body mass index is 27.62 kg/m². Estimated discharge date: June 26  Barriers: Awaiting echo, clinical improvement, oncology eval, cytology from thoracentesis fluids    Code status: Full  Prophylaxis: SCD's  Recommended Disposition: Home w/Family     Subjective:     Chief Complaint / Reason for Physician Visit  Discussed with RN events overnight. Patient was seen and examined. No acute events overnight. \"Feeling much better than yesterday\"    Review of Systems:  Symptom Y/N Comments  Symptom Y/N Comments   Fever/Chills n   Chest Pain n    Poor Appetite    Edema y    Cough n   Abdominal Pain n    Sputum    Joint Pain     SOB/NEWTON y  improving  Pruritis/Rash     Nausea/vomit n   Tolerating PT/OT     Diarrhea    Tolerating Diet y    Constipation    Other       Could NOT obtain due to:          Objective:     VITALS:   Last 24hrs VS reviewed since prior progress note.  Most recent are:  Patient Vitals for the past 24 hrs:   Temp Pulse Resp BP SpO2   06/24/21 0800  95      06/24/21 0733 97.7 °F (36.5 °C) 96 16 136/68 97 %   06/24/21 0254 98.2 °F (36.8 °C) 93 16 (!) 106/58 95 %   06/23/21 2236 98.4 °F (36.9 °C) 98 18 107/64 95 %   06/23/21 1941 98.4 °F (36.9 °C) 99 21 137/70 96 %   06/23/21 1704  100  128/70    06/23/21 1407 98.2 °F (36.8 °C) 98 26 (!) 128/58 98 %   06/23/21 1401  88 29 115/61    06/23/21 1354  90 21 101/60    06/23/21 1352  (!) 59 19 (!) 68/36    06/23/21 1223    114/63        Intake/Output Summary (Last 24 hours) at 6/24/2021 1129  Last data filed at 6/24/2021 1100  Gross per 24 hour   Intake 1970 ml   Output 2600 ml   Net -630 ml        I had a face to face encounter and independently examined this patient on 6/24/2021, as outlined below:  PHYSICAL EXAM:  General: WD, WN. Alert, cooperative, no acute distress    EENT:  EOMI. Anicteric sclerae. MMM  Resp:  CTA bilaterally, no wheezing or rales. No accessory muscle use  CV:  Regular  rhythm,  +2 edema  GI:  Soft, Non distended, Non tender. +Bowel sounds  Neurologic:  Alert and oriented X 3, normal speech,   Psych:   Good insight. Not anxious nor agitated  Skin:  No rashes. No jaundice    Reviewed most current lab test results and cultures  YES  Reviewed most current radiology test results   YES  Review and summation of old records today    NO  Reviewed patient's current orders and MAR    YES  PMH/SH reviewed - no change compared to H&P  ________________________________________________________________________  Care Plan discussed with:    Comments   Patient x    Family      RN x    Care Manager x    Consultant                       x Multidiciplinary team rounds were held today with , nursing, pharmacist and clinical coordinator. Patient's plan of care was discussed; medications were reviewed and discharge planning was addressed. ________________________________________________________________________  Total NON critical care TIME:  36   Minutes    Total CRITICAL CARE TIME Spent:   Minutes non procedure based      Comments   >50% of visit spent in counseling and coordination of care     ________________________________________________________________________  Halima Reyes MD     Procedures: see electronic medical records for all procedures/Xrays and details which were not copied into this note but were reviewed prior to creation of Plan. LABS:  I reviewed today's most current labs and imaging studies.   Pertinent labs include:  Recent Labs     06/24/21  0309 06/23/21  0457 06/22/21  0937   WBC 7.0 8.5 7.8   HGB 8.3* 8.6* 9.3*   HCT 28.1* 29.5* 32.7*    341 393     Recent Labs     06/23/21  0457 06/22/21  0937 06/21/21  1232  136 142   K 4.5 4.5 5.8*    107 113*   CO2 21 22 23   * 152* 143*   BUN 19 19 21*   CREA 1.37* 1.26* 1.41*   CA 7.8* 8.4* 8.5   ALB 2.2* 2.6* 2.7*   TBILI 0.5 0.6 0.5   ALT 23 28 30       Signed: Marti Turk MD

## 2021-06-24 NOTE — PROGRESS NOTES
Cardiology Progress Note      6/24/2021 12:55 PM    Admit Date: 6/22/2021    Admit Diagnosis: Pericardial effusion [I31.3]      Subjective:     Pascale Galo feels well. No SOB. Hemodynamics stable.     Visit Vitals  /62 (BP 1 Location: Left upper arm, BP Patient Position: At rest)   Pulse 100   Temp 98.2 °F (36.8 °C)   Resp 18   Ht 5' 5\" (1.651 m)   Wt 166 lb (75.3 kg)   LMP  (LMP Unknown)   SpO2 96%   Breastfeeding No   BMI 27.62 kg/m²       Current Facility-Administered Medications   Medication Dose Route Frequency    cefTRIAXone (ROCEPHIN) 2 g in 0.9% sodium chloride (MBP/ADV) 50 mL MBP  2 g IntraVENous Q24H    azithromycin (ZITHROMAX) 500 mg in 0.9% sodium chloride 250 mL (VIAL-MATE)  500 mg IntraVENous Q24H    ALPRAZolam (XANAX) tablet 0.5 mg  0.5 mg Oral QHS PRN    azelastine (ASTELIN) 137mcg/spray nasal spray  1 Spray Both Nostrils BID PRN    lisinopriL (PRINIVIL, ZESTRIL) tablet 40 mg  40 mg Oral DAILY    atorvastatin (LIPITOR) tablet 10 mg  10 mg Oral DAILY    sodium chloride (NS) flush 5-40 mL  5-40 mL IntraVENous Q8H    sodium chloride (NS) flush 5-40 mL  5-40 mL IntraVENous PRN    acetaminophen (TYLENOL) tablet 650 mg  650 mg Oral Q6H PRN    Or    acetaminophen (TYLENOL) suppository 650 mg  650 mg Rectal Q6H PRN    polyethylene glycol (MIRALAX) packet 17 g  17 g Oral DAILY PRN    ondansetron (ZOFRAN ODT) tablet 4 mg  4 mg Oral Q8H PRN    Or    ondansetron (ZOFRAN) injection 4 mg  4 mg IntraVENous Q6H PRN    insulin lispro (HUMALOG) injection   SubCUTAneous AC&HS    glucose chewable tablet 16 g  4 Tablet Oral PRN    dextrose (D50W) injection syrg 12.5-25 g  12.5-25 g IntraVENous PRN    glucagon (GLUCAGEN) injection 1 mg  1 mg IntraMUSCular PRN    furosemide (LASIX) injection 40 mg  40 mg IntraVENous BID         Objective:      Physical Exam:  Visit Vitals  /62 (BP 1 Location: Left upper arm, BP Patient Position: At rest)   Pulse 100   Temp 98.2 °F (36.8 °C)   Resp 18 Ht 5' 5\" (1.651 m)   Wt 166 lb (75.3 kg)   LMP  (LMP Unknown)   SpO2 96%   Breastfeeding No   BMI 27.62 kg/m²     General Appearance:  Well developed, well nourished,alert and oriented x 3, and individual in no acute distress. Ears/Nose/Mouth/Throat:   Hearing grossly normal.         Neck: Supple. Chest:   Lungs clear to auscultation bilaterally. Cardiovascular:  Regular rate and rhythm, S1, S2 normal, no murmur. Abdomen:   Soft, non-tender, bowel sounds are active. Extremities: No edema bilaterally. Skin: Warm and dry. Data Review:   Labs:    Recent Results (from the past 24 hour(s))   GLUCOSE, POC    Collection Time: 06/23/21  3:54 PM   Result Value Ref Range    Glucose (POC) 127 (H) 65 - 117 mg/dL    Performed by Al NETTLES    GLUCOSE, POC    Collection Time: 06/23/21  8:53 PM   Result Value Ref Range    Glucose (POC) 138 (H) 65 - 117 mg/dL    Performed by Chapis DIAZ.    CBC WITH AUTOMATED DIFF    Collection Time: 06/24/21  3:09 AM   Result Value Ref Range    WBC 7.0 3.6 - 11.0 K/uL    RBC 3.47 (L) 3.80 - 5.20 M/uL    HGB 8.3 (L) 11.5 - 16.0 g/dL    HCT 28.1 (L) 35.0 - 47.0 %    MCV 81.0 80.0 - 99.0 FL    MCH 23.9 (L) 26.0 - 34.0 PG    MCHC 29.5 (L) 30.0 - 36.5 g/dL    RDW 17.8 (H) 11.5 - 14.5 %    PLATELET 327 470 - 995 K/uL    MPV 10.1 8.9 - 12.9 FL    NRBC 0.0 0  WBC    ABSOLUTE NRBC 0.00 0.00 - 0.01 K/uL    NEUTROPHILS 57 32 - 75 %    LYMPHOCYTES 26 12 - 49 %    MONOCYTES 12 5 - 13 %    EOSINOPHILS 4 0 - 7 %    BASOPHILS 1 0 - 1 %    IMMATURE GRANULOCYTES 0 0.0 - 0.5 %    ABS. NEUTROPHILS 3.9 1.8 - 8.0 K/UL    ABS. LYMPHOCYTES 1.8 0.8 - 3.5 K/UL    ABS. MONOCYTES 0.9 0.0 - 1.0 K/UL    ABS. EOSINOPHILS 0.3 0.0 - 0.4 K/UL    ABS. BASOPHILS 0.1 0.0 - 0.1 K/UL    ABS. IMM.  GRANS. 0.0 0.00 - 0.04 K/UL    DF AUTOMATED     GLUCOSE, POC    Collection Time: 06/24/21  6:32 AM   Result Value Ref Range    Glucose (POC) 130 (H) 65 - 117 mg/dL    Performed by Edith Hopper Dennis Elena    GLUCOSE, POC    Collection Time: 06/24/21 11:33 AM   Result Value Ref Range    Glucose (POC) 176 (H) 65 - 117 mg/dL    Performed by Yvonne Raygoza    ECHO ADULT FOLLOW-UP OR LIMITED    Collection Time: 06/24/21 12:16 PM   Result Value Ref Range    Tapse 1.98 1.50 - 2.00 cm       Telemetry: normal sinus rhythm      Assessment:     Hospital Problems  Date Reviewed: 12/15/2020        Codes Class Noted POA    Pleural effusion, left ICD-10-CM: J90  ICD-9-CM: 511.9  6/23/2021 Unknown        Pericardial effusion ICD-10-CM: I31.3  ICD-9-CM: 423.9  6/22/2021 Unknown        Hyperlipidemia associated with type 2 diabetes mellitus ([address] (Chronic) ICD-10-CM: E11.69, E78.5  ICD-9-CM: 250.80, 272.4  3/18/2019 Yes        Diabetes mellitus ([address] ICD-10-CM: E11.9  ICD-9-CM: 250.00  7/15/2013 Yes        Hypertension ICD-10-CM: I10  ICD-9-CM: 401.9  7/15/2013 Yes              Plan:     Pericardial effusion- limited echo shows moderate effusion. No significant change. May need window if does not resolve. Can go home from cardiac stand point. Switch to oral lasix. F/u in 2 weeks with echo.     Jarrell Lopez MD

## 2021-06-25 PROBLEM — I31.39 PERICARDIAL EFFUSION: Status: RESOLVED | Noted: 2021-01-01 | Resolved: 2021-01-01

## 2021-06-25 PROBLEM — J90 PLEURAL EFFUSION, LEFT: Status: RESOLVED | Noted: 2021-01-01 | Resolved: 2021-01-01

## 2021-06-25 NOTE — PROGRESS NOTES
Problem: Falls - Risk of  Goal: *Absence of Falls  Description: Document [place] Fall Risk and appropriate interventions in the flowsheet.   Outcome: Progressing Towards Goal  Note: Fall Risk Interventions:            Medication Interventions: Bed/chair exit alarm, Patient to call before getting OOB, Teach patient to arise slowly

## 2021-06-25 NOTE — PROGRESS NOTES
Problem: Diabetes Self-Management  Goal: *Disease process and treatment process  Description: Define diabetes and identify own type of diabetes; list 3 options for treating diabetes. Outcome: Resolved/Met  Goal: *Incorporating nutritional management into lifestyle  Description: Describe effect of type, amount and timing of food on blood glucose; list 3 methods for planning meals. Outcome: Resolved/Met  Goal: *Incorporating physical activity into lifestyle  Description: State effect of exercise on blood glucose levels. Outcome: Resolved/Met  Goal: *Developing strategies to promote health/change behavior  Description: Define the ABC's of diabetes; identify appropriate screenings, schedule and personal plan for screenings. Outcome: Resolved/Met  Goal: *Using medications safely  Description: State effect of diabetes medications on diabetes; name diabetes medication taking, action and side effects. Outcome: Resolved/Met  Goal: *Monitoring blood glucose, interpreting and using results  Description: Identify recommended blood glucose targets  and personal targets. Outcome: Resolved/Met  Goal: *Prevention, detection, treatment of acute complications  Description: List symptoms of hyper- and hypoglycemia; describe how to treat low blood sugar and actions for lowering  high blood glucose level. Outcome: Resolved/Met  Goal: *Prevention, detection and treatment of chronic complications  Description: Define the natural course of diabetes and describe the relationship of blood glucose levels to long term complications of diabetes.   Outcome: Resolved/Met  Goal: *Developing strategies to address psychosocial issues  Description: Describe feelings about living with diabetes; identify support needed and support network  Outcome: Resolved/Met  Goal: *Insulin pump training  Outcome: Resolved/Met  Goal: *Sick day guidelines  Outcome: Resolved/Met  Goal: *Patient Specific Goal (EDIT GOAL, INSERT TEXT)  Outcome: Resolved/Met Problem: Patient Education: Go to Patient Education Activity  Goal: Patient/Family Education  Outcome: Resolved/Met     Problem: Falls - Risk of  Goal: *Absence of Falls  Description: Document [place] Fall Risk and appropriate interventions in the flowsheet. Outcome: Resolved/Met  Note: Fall Risk Interventions:            Medication Interventions: Teach patient to arise slowly                   Problem: Patient Education: Go to Patient Education Activity  Goal: Patient/Family Education  Outcome: Resolved/Met     Problem: Breathing Pattern - Ineffective  Goal: *Absence of hypoxia  Outcome: Resolved/Met  Goal: *Use of effective breathing techniques  Outcome: Resolved/Met  Goal: *PALLIATIVE CARE:  Alleviation of Dyspnea  Outcome: Resolved/Met     Problem: Patient Education: Go to Patient Education Activity  Goal: Patient/Family Education  Outcome: Resolved/Met     Problem: Falls - Risk of  Goal: *Absence of Falls  Description: Document [place] Fall Risk and appropriate interventions in the flowsheet. Outcome: Resolved/Met  Note: Fall Risk Interventions:            Medication Interventions: Teach patient to arise slowly                   Problem: Patient Education: Go to Patient Education Activity  Goal: Patient/Family Education  Outcome: Resolved/Met     Problem: Pressure Injury - Risk of  Goal: *Prevention of pressure injury  Description: Document Deshawn Scale and appropriate interventions in the flowsheet. Outcome: Resolved/Met  Note: Pressure Injury Interventions:  Sensory Interventions: Assess changes in LOC         Activity Interventions: Increase time out of bed, Assess need for specialty bed    Mobility Interventions: Turn and reposition approx.  every two hours(pillow and wedges)    Nutrition Interventions: Offer support with meals,snacks and hydration    Friction and Shear Interventions: Lift sheet, Minimize layers                Problem: Patient Education: Go to Patient Education Activity  Goal: Patient/Family Education  Outcome: Resolved/Met

## 2021-06-25 NOTE — PROGRESS NOTES
Pharmacist Discharge Medication Reconciliation    Encounter Diagnoses   Name Primary? Pericardial effusion     Pleural effusion, left     Essential hypertension      Allergies: Patient has no known allergies. Discharge Medications:   Current Discharge Medication List        START taking these medications    Details   furosemide (LASIX) 40 mg tablet Take 1 Tablet by mouth daily. Qty: 30 Tablet, Refills: 0  Start date: 6/25/2021      azithromycin (ZITHROMAX) 500 mg tab Take 1 Tablet by mouth daily for 5 days. Qty: 5 Tablet, Refills: 0  Start date: 6/25/2021, End date: 6/30/2021           CONTINUE these medications which have NOT CHANGED    Details   metFORMIN (GLUCOPHAGE) 500 mg tablet TAKE 1 TABLET BY MOUTH TWICE A DAY WITH FOOD  Qty: 180 Tablet, Refills: 3      glipiZIDE (GLUCOTROL) 5 mg tablet TAKE 1 TABLET BY MOUTH EVERY DAY  Qty: 90 Tab, Refills: 3      !! glucose blood VI test strips (blood glucose test) strip Pt tests daily. DX: E11.65, Please provide with one touch ultra test strips  Qty: 100 Strip, Refills: 11    Associated Diagnoses: Type 2 diabetes mellitus without complication, without long-term current use of insulin ([facility])      Blood-Glucose Meter misc Pt tests daily. Dx: E11.65  Qty: 1 Each, Refills: 0    Associated Diagnoses: Type 2 diabetes mellitus without complication, without long-term current use of insulin ([facility]      ! ! glucose blood VI test strips (ACCU-CHEK MARY GRACE) strip Diagnosis E11.65. Pt is testing once daily. Qty: 100 Strip, Refills: 1      simvastatin (ZOCOR) 20 mg tablet TAKE 1 TABLET BY MOUTH EVERY NIGHT  Qty: 90 Tablet, Refills: 3      amitriptyline (ELAVIL) 10 mg tablet Take 1 Tab by mouth nightly. Indications: sleep/anxiety  Qty: 30 Tab, Refills: 5    Associated Diagnoses: PTSD (post-traumatic stress disorder);  Sleep difficulties      ramipriL (ALTACE) 10 mg capsule TAKE 2 CAPSULES BY MOUTH EVERY DAY  Qty: 180 Cap, Refills: 3    Associated Diagnoses: Essential hypertension with goal blood pressure less than 140/90       ! ! - Potential duplicate medications found. Please discuss with provider. STOP taking these medications       azelastine (ASTELIN) 137 mcg (0.1 %) nasal spray Comments:   Reason for Stopping:         ALPRAZolam (XANAX) 0.5 mg tablet Comments:   Reason for Stopping:               The patient's chart, MAR and AVS were reviewed by Leslee Canas RP.     Discharging Provider: Kyle Oh MD    Thank you,     BETH Ames MARCUS [facility]

## 2021-06-25 NOTE — PROGRESS NOTES
Transition of Care Plan:     RUR: 13%  Disposition: Home with Follow-up Appointment  Follow up appointments: PCP  DME needed: None  Transportation at Discharge:Pt's  Herbert Self will transport at d/c.   [place] Grams or means to access home:   Pt has assess to the home     IM Medicare letter: 2nd IM Letter done 6/25  Caregiver Contact: Herbert Self-  [phone]  Discharge Caregiver contacted prior to discharge?    done      Reason for Admission:   Pericardial effusion    All in agreement with d/c to home today without d/c needs. I called PCP office and spoke with [facility]. Elvin't on AVS and given to pt//spouse. They will dc ~1230 today. Care Management Interventions  PCP Verified by CM: Yes  Palliative Care Criteria Met (RRAT>21 & CHF Dx)?: No  Mode of Transport at Discharge:  Other (see comment) (Pt's  Herbert Self will transport at d/c. )  Transition of Care Consult (CM Consult): Discharge Planning  MyChart Signup: No  Discharge Durable Medical Equipment: No  Physical Therapy Consult: No  Occupational Therapy Consult: No  Speech Therapy Consult: No  Current Support Network: Lives with Spouse  Confirm Follow Up Transport: Family  The Plan for Transition of Care is Related to the Following Treatment Goals : dc planning  The Patient and/or Patient Representative was Provided with a Choice of Provider and Agrees with the Discharge Plan?: Yes  Name of the Patient Representative Who was Provided with a Choice of Provider and Agrees with the Discharge Plan: patient  Freedom of Choice List was Provided with Basic Dialogue that Supports the Patient's Individualized Plan of Care/Goals, Treatment Preferences and Shares the Quality Data Associated with the Providers?: Yes   Resource Information Provided?: No  Discharge Location  Discharge Placement: Home

## 2021-06-25 NOTE — PROGRESS NOTES
0700: Bedside shift change report given to Saadia Rolon (oncoming nurse) by Carla Cabot (offgoing nurse). Report included the following information SBAR and Kardex. 1217: Reviewed discharge paperwork to pt's satisfaction. Patient discharged to home. Transported to exit in wheelchair by tech. Driven home by .

## 2021-06-25 NOTE — PROGRESS NOTES
End of Shift Note    Bedside shift change report given to Ivan Condon RN (oncoming nurse) by Gwen Doyle RN (offgoing nurse). Report included the following information SBAR    Shift worked:  night     Shift summary and any significant changes:     0400: pt had a run of svt asymptomatic, VSS, resting quietly. NP notified. Patient refused to be stuck for labs she says her arms just cant take it   Concerns for physician to address:       Zone phone for oncoming shift:          Activity:  Activity Level: Up with Assistance  Number times ambulated in hallways past shift: 0  Number of times OOB to bathroom past shift: 2    Cardiac:   Cardiac Monitoring: Yes      Cardiac Rhythm: Sinus Rhythm    Access:   Current line(s): PIV     Genitourinary:   Urinary status: voiding    Respiratory:   O2 Device: None (Room air)  Chronic home O2 use?: NO  Incentive spirometer at bedside: YES     GI:  Last Bowel Movement Date: 06/20/21  Current diet:  ADULT DIET Regular; 4 carb choices (60 gm/meal)  Passing flatus: YES  Tolerating current diet: YES       Pain Management:   Patient states pain is manageable on current regimen: YES    Skin:  Deshawn Score: 20  Interventions: float heels    Patient Safety:  Fall Score:  Total Score: 1  Interventions: bed/chair alarm, assistive device (walker, cane, etc), gripper socks, pt to call before getting OOB and stay with me (per policy)       Length of Stay:  Expected LOS: 4d 21h  Actual LOS: 3      Gwen Doyle RN

## 2021-06-25 NOTE — DISCHARGE SUMMARY
Hospitalist Discharge Summary     Patient ID:  Freay Dayami  [id number]  89 y.o.  1945 6/22/2021    PCP on record: Allen Mendez DO    Admit date: 6/22/2021  Discharge date and time: 6/25/2021    DISCHARGE DIAGNOSIS:    Shortness of breath. Left-sided pleural effusion POA  Pericardial effusion POA  History of renal cell carcinoma status post nephrectomy  Liver lesion concerning for metastatic disease  Likely left lower lobe pneumonia POA  Acute diastolic heart failure, POA  CAP  Hypertension       Non-insulin-dependent diabetes            CONSULTATIONS:  IP CONSULT TO CARDIOLOGY  IP CONSULT TO HOSPITALIST  IP CONSULT TO ONCOLOGY    Excerpted HPI from H&P of Stephanie Mckeon MD:    Pepper Parent is a 68 y.o.  female with past medical history significant for renal cell carcinoma status post right nephrectomy last year, hypertension and non-insulin-dependent diabetes who presented to ED with a complains of worsening dyspnea associated with exertional shortness of breath and orthopnea. He denies any chest pain. Patient denies any fever or chills. She denies any chest pain. Reports her symptoms started as a deep cough few months ago and has been followed with progressive shortness of breath since then. She started noticing bilateral lower extreme edema about a week ago. She had a x-ray done in the ED which showed left-sided pleural effusion and left lower lobe consolidation. CT chest was ordered which showed a liver lesion, pericardial implant, pericardial effusion and pleural effusion with left lower lobe consolidation. It is highly suspicious of metastatic malignancy. Patient denies any nausea, vomiting, diarrhea, urinary consistency dysuria, heat or cold intolerance.  ______________________________________________________________________  DISCHARGE SUMMARY/HOSPITAL COURSE:  for full details see H&P, daily progress notes, labs, consult notes.      Shortness of breath. Left-sided pleural effusion POA  Pericardial effusion POA  History of renal cell carcinoma status post nephrectomy  Liver lesion concerning for metastatic disease  Likely left lower lobe pneumonia POA  Acute diastolic heart failure, POA  CAP     -Patient presented with worsening shortness of breath  -She is noted to have left-sided pleural and pericardial effusion on CT of the chest with left lower lobe consolidation  -Noted to have pericardial implant in a liver lesion suspicious of malignancy  -Patient has history of renal cell carcinoma status post right nephrectomy last year  -Pericardial and pleural effusion is highly concerning for malignant pleural effusion due 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 Fluids looks transudate. Given her significant history of malignancy and liver lesions concerning for metastatic disease, oncology was called. Input is appreciated. They will follow up as outpatient for liver biopsy, I talked with Dr Marijean Sever on 06/24  Cytology from thoracentesis fluid with no malignant cells reported   CT abdomen Multiple small hypodense lesions in the liver as above, new since 7/22/2020,  indeterminate but suspicious for metastatic disease.  Consider further evaluation  with MRI abdomen, which may provide better sensitivity for lesions  -MRI brain with no metastasis reported    -Empirically started on ceftriaxone and azithromycin due to presence of likely consolidation in the left lower lobe, dc on azithromycin for CAP  -Echo resulted with normal EF and stage I diastolic heart failure  -Clinically patient does not have a tamponade physiology  -Patient has bilateral lower extremity edema and presents with orthopnea, improved on day of discahrge  -Although most of the symptoms are attributed to pleural and pericardial effusion but patient also has peripheral edema  -Started on Lasix 40 mg IV twice daily, continue     Hypertension  -Continue ACE inhibitor     Non-insulin-dependent diabetes  -cont home meds              _______________________________________________________________________  Patient seen and examined by me on discharge day. Pertinent Findings:  Gen:    Not in distress  Chest: Clear lungs  CVS:   Regular rhythm. No edema  Abd:  Soft, not distended, not tender  Neuro:  Alert,   _______________________________________________________________________  DISCHARGE MEDICATIONS:   Current Discharge Medication List      START taking these medications    Details   furosemide (LASIX) 40 mg tablet Take 1 Tablet by mouth daily. Qty: 30 Tablet, Refills: 0  Start date: 6/25/2021      azithromycin (ZITHROMAX) 500 mg tab Take 1 Tablet by mouth daily for 5 days. Qty: 5 Tablet, Refills: 0  Start date: 6/25/2021, End date: 6/30/2021         CONTINUE these medications which have NOT CHANGED    Details   metFORMIN (GLUCOPHAGE) 500 mg tablet TAKE 1 TABLET BY MOUTH TWICE A DAY WITH FOOD  Qty: 180 Tablet, Refills: 3      glipiZIDE (GLUCOTROL) 5 mg tablet TAKE 1 TABLET BY MOUTH EVERY DAY  Qty: 90 Tab, Refills: 3      !! glucose blood VI test strips (blood glucose test) strip Pt tests daily. DX: E11.65, Please provide with one touch ultra test strips  Qty: 100 Strip, Refills: 11    Associated Diagnoses: Type 2 diabetes mellitus without complication, without long-term current use of insulin (HCC)      Blood-Glucose Meter misc Pt tests daily. Dx: E11.65  Qty: 1 Each, Refills: 0    Associated Diagnoses: Type 2 diabetes mellitus without complication, without long-term current use of insulin ([address]      ! ! glucose blood VI test strips (ACCU-CHEK MARY GRACE) strip Diagnosis E11.65. Pt is testing once daily. Qty: 100 Strip, Refills: 1      azelastine (ASTELIN) 137 mcg (0.1 %) nasal spray 1 [place] by Both Nostrils route two (2) times daily as needed for Rhinitis.  Use in each nostril as directed  Qty: 1 Bottle, Refills: 1 simvastatin (ZOCOR) 20 mg tablet TAKE 1 TABLET BY MOUTH EVERY NIGHT  Qty: 90 Tablet, Refills: 3      amitriptyline (ELAVIL) 10 mg tablet Take 1 Tab by mouth nightly. Indications: sleep/anxiety  Qty: 30 Tab, Refills: 5    Associated Diagnoses: PTSD (post-traumatic stress disorder); Sleep difficulties      ramipriL (ALTACE) 10 mg capsule TAKE 2 CAPSULES BY MOUTH EVERY DAY  Qty: 180 Cap, Refills: 3    Associated Diagnoses: Essential hypertension with goal blood pressure less than 140/90      ALPRAZolam (XANAX) 0.5 mg tablet Take 1 Tab by mouth nightly as needed for Anxiety. Max Daily Amount: 0.5 mg.  Qty: 30 Tab, Refills: 0    Comments: Not to exceed 5 additional fills before 01/19/2020  Associated Diagnoses: Insomnia secondary to anxiety       ! ! - Potential duplicate medications found. Please discuss with provider. Patient Follow Up Instructions:    Activity: Activity as tolerated  Diet: Resume previous diet  Wound Care: None needed    Follow-up with Oncology    Follow-up Information     Follow up With Specialties Details Why Contact Info    Jett Martinez, DO Internal Medicine  The nurs will contact you with appointment date and time  [address] [facility]  [address] [facility]      Juan Lara MD Hematology and Oncology, Internal Medicine, Hematology, Oncology Call in 2 days For hospital follow up [address] [address]  [facility]  [phone]          ________________________________________________________________    Risk of deterioration: Low    Condition at Discharge:  Stable  __________________________________________________________________    Disposition  Home with family, no needs    ____________________________________________________________________    Code Status: Full Code  ___________________________________________________________________      Total time in minutes spent coordinating this discharge (includes going over instructions, follow-up, prescriptions, and preparing report for sign off to her PCP) :  >30 minutes    Signed:  Rosa Dior MD

## 2021-06-28 NOTE — PROGRESS NOTES
Physician Progress Note      PATIENT:               Sussy Blair  CSN #:                  [id number]  :                       1945  ADMIT DATE:       2021 9:43 AM  DISCH DATE:        2021 12:53 PM  RESPONDING  PROVIDER #:        Sana Peacock MD        QUERY TEXT:    Stage of Chronic Kidney Disease: Please provide further specificity, if known. Clinical indicators include: chronic kidney disease, bun, creatinine, gfr  Options provided:  -- Chronic kidney disease stage 1  -- Chronic kidney disease stage 2  -- Chronic kidney disease stage 3  -- Chronic kidney disease stage 3a  -- Chronic kidney disease stage 3b  -- Chronic kidney disease stage 4  -- Chronic kidney disease stage 5  -- Chronic kidney disease stage 5, requiring dialysis  -- End stage renal disease  -- Other - I will add my own diagnosis  -- Disagree - Not applicable / Not valid  -- Disagree - Clinically Unable to determine / Unknown        PROVIDER RESPONSE TEXT:    Provider dismissed this query because it was not applicable to the patient or not a valid query.   i am an oncologist. i have no comment on stage of kidney dysfunvtion      Electronically signed by:  Sana Peacock MD 2021 8:25 AM

## 2021-06-29 NOTE — PROGRESS NOTES
Care Transitions Initial Call    Call within 2 business days of discharge: Yes     Patient: Rodney Pineda Patient : 1945 MRN: [id number]    Last Discharge [address]       Complaint Diagnosis Description Type Department Provider    21 Shortness of Breath Pericardial effusion . .. ED to Hosp-Admission (Discharged) (ADMIT) [id number] Lucas Morel MD; Isidra Ochoa. .. Was this an external facility discharge? No     Challenges to be reviewed by the provider   Additional needs identified to be addressed with provider: yes    Patient presented with worsening shortness of breath  -She is noted to have left-sided pleural and pericardial effusion on CT of the chest with left lower lobe consolidation  -Noted to have pericardial implant in a liver lesion suspicious of malignancy  -Patient has history of renal cell carcinoma status post right nephrectomy last year  -Pericardial and pleural effusion is highly concerning for malignant pleural effusion due to likely metastatic disease  -Status post thoracentesis  with 800 cc drained. Fluids looks transudate  -Cytology from thoracentesis fluid with no malignant cells reported       Patient has scheduled liver bx on 2021---will be followed by Dr Espinoza Patient  Patient had heart echo scheduled 2021       Method of communication with provider : chart routing    Discussed COVID-19 related testing which was not done at this time. Test results were not done. Advance Care Planning:   Does patient have an Advance Directive: yes, reviewed and current. Inpatient Readmission Risk score: Unplanned Readmit Risk Score: 13    Was this a readmission?  no       Patients top risk factors for readmission: medical condition-patient is currently being worked up for liver cancer , previous hx renal cancer   Interventions to address risk factors: Scheduled appointment with PCP-see note below, Scheduled appointment with Specialist-see note below, Education of patient/family/caregiver/guardian to support self-management-self monitoring of VS and fluid status to report to MD, Assessment and support for treatment adherence and medication management-confirmed pt has new meds and is taking as directed and Assistance in [facility] Transition Nurse (CTN) contacted the patient by telephone to perform post hospital discharge assessment. Verified name and  with patient as identifiers. Provided introduction to self, and explanation of the CTN role. CTN reviewed discharge instructions, medical action plan and red flags with patient who verbalized understanding. Were discharge instructions available to patient? yes. Reviewed appropriate site of care based on symptoms and resources available to patient including: PCP. Patient given an opportunity to ask questions and does not have any further questions or concerns at this time. The patient agrees to contact the PCP office for questions related to their healthcare. Medication reconciliation was performed with patient, who verbalizes understanding of administration of home medications. Advised obtaining a 90-day supply of all daily and as-needed medications. Referral to Pharm D needed: no     Home Health/Outpatient orders at discharge: none    Durable Medical Equipment ordered at discharge: None    Covid Risk Education    Educated patient about risk for severe COVID-19 due to risk factors according to CDC guidelines. CTN reviewed discharge instructions, medical action plan and red flag symptoms with the patient who verbalized understanding. Discussed COVID vaccination status: yes. Education provided on COVID-19 vaccination as appropriate. Discussed exposure protocols and quarantine with CDC Guidelines. Patient was given an opportunity to verbalize any questions and concerns and agrees to contact CTN or health care provider for questions related to their healthcare.     Was patient discharged with a pulse oximeter? no.     Discussed follow-up appointments. If no appointment was previously scheduled, appointment scheduling offered: yes. Is follow up appointment scheduled within 7 days of discharge? yes. [facility] follow up appointment(s):   Future Appointments   Date Time Provider Sindi Parekh   6/30/2021 11:30 AM Joss Russell DO [facility] BS AMB   7/1/2021  3:00 PM Neelam Turk MD [facility] BS AMB   [facility]  [facility]   7/26/2021  1:00 PM ECHOCARDIOGRAM ROOM [address] [place] [address] REG   7/26/2021  2:00 PM [facility] DEXA 1 [facility] REG   12/20/2021 11:30 AM Rani Gomez III,  [facility] BS AMB     Non-BS follow up appointment(s): none at this time    Plan for follow-up call in 5-7 days based on severity of symptoms and risk factors. Plan for next call: follow up appointment-ensure pt attends testing appts and MD appts  CTN provided contact information for future needs. Goals Addressed                 This Visit's Progress     Prevent complications post hospitalization. 06/28/21  CTN unable to reach patient on phone, KEM on . --chacorta    06/29/21  CTN was able to reach pt today on phone to review discharge instructions/red flags to prevent complications. Appts:    PCP--6/30/21  DR Lisa---7/1/21    CT for liver bx---7/6/21    Echo to reevaluate effusion--7/26/21    Patient appts reviewed above with patient---she is aware of them and states she will attend.  At time of call, patient states that she is feeling better---she reports that she picked up her abx and lasix --is taking as directed. Patient states she is no longer SOB, denies coughing , fever, N/V, or pain/ CP. Patient states she does not have a home pulse ox but does have BP cuff. She has not taken BP today---CTN instructed her to monitor BP and HR and to call MD if she has consistent abnormal readings. CTN advised patient to buy pulse ox for home use.    Patient reports she has lower extremity edema however it improved a great deal while hospitalized. CTN instructed patient to elevate legs when sitting, and to ambulate when possible within her home or if she preferred outdoors to do so before it gets too hot. CTN instructed pt to call MD if she notices edema increasing in legs even on lasix. Pt verbalizes understanding.  Patient states she is \"just waiting now to see what I have to deal with\" referring to liver Bx. Patient asked CTN what to expect as she has not had a bx before---CTN discussed with patient a generalized overview of what would be done during procedure however informed her that she may receive specific instructions closer to time of procedure. Patient verbalizes understanding.  Patient tells CTN that both of her arms are very bruised as patient was a difficult stick and the IV's did not hold up that were placed. CTN assessed pt for cellulitis, she denies pain or redness on arms. Pt states she will ask her PCP to look at arms at appt \"to make sure\". [address] information provided. Patient had no additional questions or needs at time of call.  CTN will follow up next week---chacorta

## 2021-06-30 NOTE — PROGRESS NOTES
Yadira Yanez is a 68 y.o. female  Chief Complaint   Patient presents with   [facility] Follow Up     [address] Maintenance Due   Topic Date Due    Foot Exam Q1  01/15/2019     Visit Vitals  /67   Pulse (!) 114   Temp 98 °F (36.7 °C) (Oral)   Resp 16   Ht 5' 5\" (1.651 m)   Wt 164 lb (74.4 kg)   SpO2 95%   BMI 27.29 kg/m²     1. Have you been to the ER, urgent care clinic since your last visit? Hospitalized since your last visit? Yes, ED [facility]    2. Have you seen or consulted any other health care providers outside of the [address] [place] [address] since your last visit? Include any pap smears or colon screening.  No

## 2021-06-30 NOTE — PROGRESS NOTES
Rodney Pineda is a 68 y.o. female who presents for evaluation of transition of care. Last seen by me June 21, 2021. Weight was 177 then, and she had some leg edema, sats 98% RA. Next day, she had worsening orthopnea, went to [facility] ed. cxr showed left pleural effusion, and ct scan showed liver mass. She underwent thoracentesis, cytology negative. Recall she had right nephrectomy last year for RCC. She has a liver biopsy set up for tomorrow morning. She was d/c to home with daily lasix, and her weight is down 13 lbs. She overall feels much better, has much less edema in her legs.       ROS:  Constitutional: negative for fevers, chills, anorexia and weight loss  Eyes:   negative for visual disturbance and irritation  ENT:   negative for tinnitus,sore throat,nasal congestion,ear pain,hoarseness  Respiratory:  negative for cough, hemoptysis, dyspnea,wheezing  CV:   negative for chest pain, palpitations.  ++trace to 1+ lower extremity edema  GI:   negative for nausea, vomiting, diarrhea, abdominal pain,melena  Genitourinary: negative for frequency, dysuria and hematuria  Musculoskel: negative for myalgias, arthralgias, back pain, muscle weakness, joint pain  Neurological:  negative for headaches, dizziness, focal weakness, numbness  Psychiatric:     Negative for depression or anxiety      Past Medical History:   Diagnosis Date    Chronic kidney disease     Diabetes ([facility] [address]     Type II    History of kidney cancer 06/2020    stage 2, s/p nephrectomy, no further tx necessary    Hypercholesterolemia     Hypertension     Pleural effusion, left 6/23/2021    Psychiatric disorder     anxiety       Past Surgical History:   Procedure Laterality Date    COLONOSCOPY N/A 12/10/2019    COLONOSCOPY performed by B[facility] Dudley [facility]    HX CATARACT REMOVAL Bilateral 2015    HX CATARACT REMOVAL  01/09/2018    secondary cataracts removed    HX CHOLECYSTECTOMY  1993    HX DILATION AND CURETTAGE     HX HYSTERECTOMY  2000    complete    HX NEPHRECTOMY Right 2020    stage 2 cancer, no further tx needed       Family History   Adopted: Yes   Problem Relation Age of Onset   [employer] Cancer Daughter         lung    Cancer Son         colon       Social History     Socioeconomic History    Marital status:      Spouse name: Not on file    Number of children: Not on file    Years of education: Not on file    Highest education level: Not on file   Occupational History    Not on file   Tobacco Use    Smoking status: Never Smoker    Smokeless tobacco: Never Used   Substance and Sexual Activity    Alcohol use: No     Alcohol/week: 0.0 standard drinks    Drug use: No    Sexual activity: Yes     Partners: Male     Birth control/protection: None   Other Topics Concern    Not on file   Social History Narrative    2 children  from cancer. Social Determinants of Health     Financial Resource Strain:     Difficulty of Paying Living Expenses:    Food Insecurity:     Worried About Running Out of Food in the Last Year:     [address] in the Last Year:    Transportation Needs:     Lack of Transportation (Medical):      Lack of Transportation (Non-Medical):    Physical Activity:     Days of Exercise per Week:     Minutes of Exercise per Session:    Stress:     Feeling of Stress :    Social Connections:     Frequency of Communication with Friends and Family:     Frequency of Social Gatherings with Friends and Family:     Attends Buddhism Services:     Active Member of Clubs or Organizations:     Attends Club or Organization Meetings:     Marital Status:    Intimate Partner Violence:     Fear of Current or Ex-Partner:     Emotionally Abused:     Physically Abused:     Sexually Abused:             Visit Vitals  /67   Pulse (!) 114   Temp 98 °F (36.7 °C) (Oral)   Resp 16   Ht 5' 5\" (1.651 m)   Wt 164 lb (74.4 kg)   LMP  (LMP Unknown)   SpO2 95%   BMI 27.29 kg/m² Physical Examination:   General - Well appearing female. Looks much better. HEENT - PERRL, TM no erythema/opacification, normal nasal turbinates, no oropharyngeal erythema or exudate, MMM  Neck - supple, no bruits, no thyroidomegaly, no lymphadenopathy  Pulm - clear to auscultation bilaterally  Cardio - RRR, normal S1 S2, no murmur  Abd - soft, nontender, no masses, no HSM  Extrem - 1+ edema both legs, much improved, +2 distal pulses  Neuro-  No focal deficits, CN intact     Assessment/Plan:    1. Left sided pleural effusion--sp thoracentesis, cytology negative  2. Volume overload, ? Acute diastolic chf vs anasarca--echo had ef 60-65%. Much improved with daily lasix, continue same. Check bmp, nt pro bnp end next week. Lab slip given. 3.  Hx right RCC--sp nephrectomy  4. Liver mass--set for bx tomorrow. Concern is for mets from [address]  5.  Dm, type 2--on glipizide, glucophage. a1c 6.8  6.   ptsd--continue elavil    rtc 2 months        Colonel Sneed III, DO

## 2021-06-30 NOTE — PATIENT INSTRUCTIONS
Learning About a Pleural Effusion  What is it? A pleural effusion (say \"PLER-lizzy lb-KUCY-kozr\") is the buildup of fluid in the space between tissues lining the lungs and the chest wall. Because of the fluid buildup, the lungs may not be able to expand completely. This can make it hard to breathe. A pleural empyema (say \"hn-uc-VK-muh\") is a problem that can happen with pleural effusion. Bacteria or other infections cause pus to form in the pleural fluid. But most pleural effusions don't become infected. What causes it? A pleural effusion has many causes. They include pneumonia, cancer, inflammation of the tissues around the lungs, and heart failure. What are the symptoms? Symptoms of a pleural effusion may include:  · Trouble breathing. · Shortness of breath. · Chest pain. · Fever. · A cough. A minor pleural effusion may not cause any symptoms. How is it diagnosed? A pleural effusion is usually diagnosed with an X-ray and a physical exam. The doctor listens to the airflow in your lungs. How is it treated? A pleural effusion can be treated by removing fluid from the space between the tissues around the lungs. This is done with a needle that's put into the chest (thoracentesis). A small amount of the fluid may be sent to a lab to find out what is causing the buildup of fluid. Removing the fluid may help to relieve symptoms, such as shortness of breath and chest pain. It can help the lungs to expand more fully. If the pleural effusion doesn't get better, a catheter may be placed in the chest. This is a flexible tube that allows fluid to drain from the lungs. The catheter stays in the chest until the doctor removes it. Some people may get a treatment that removes the fluid and then puts a medicine into the chest cavity. This helps to prevent too much fluid from building up again. A minor pleural effusion often goes away on its own.   Doctors may need to treat the condition that is causing the pleural effusion. For example, you may get medicines to treat pneumonia or congestive heart failure. When the condition is treated, the effusion usually goes away. For a pleural empyema, the pus needs to be drained. It may drain from a flexible tube placed in the chest. Or you may have surgery to drain it. You also will get antibiotics. Follow-up care is a key part of your treatment and safety. Be sure to make and go to all appointments, and call your doctor if you are having problems. It's also a good idea to know your test results and keep a list of the medicines you take. Where can you learn more? Go to http://www.gray.com/  Enter A920 in the search box to learn more about \"Learning About a Pleural Effusion. \"  Current as of: October 26, 2020               Content Version: 12.8  © 4596-7426 [employer], [employer]. Care instructions adapted under license by [employer] (which disclaims liability or warranty for this information). If you have questions about a medical condition or this instruction, always ask your healthcare professional. [address] any warranty or liability for your use of this information. Come back for labs end of next week, ideally Thursday. Do NOT need to be fasting.

## 2021-07-06 NOTE — ROUTINE PROCESS
I have reviewed discharge instructions with the patient. The patient verbalized understanding. Copy of discharge instructions per AVS copied, reviewed with pt., signature sheet signed and sent to med. Rec. For scanning r/t penpad not working and copy to pt. Prior to discharge. Pt stable without c/o pain. Dressing noted clean/dry/intact right upper outer abd. Pt dressed self and sat into w/c for discharge to home via private vehicle with her  driving her home. Pt accomp. by nurse to car for discharge.

## 2021-07-06 NOTE — PROCEDURES
Interventional Radiology  Procedure Note        7/6/2021 9:15 AM    Patient: Golden Cardona     Informed consent obtained    Diagnosis: Renal cell carcinoma, hepatic lesions    Procedure(s): Ultrasound guided liver lesion biopsy    Specimens removed:  4x 18ga core samples of R hepatic lesion    Complications: None    Primary Physician: Barrett Patel MD    Recommendations: N/A    Discharge Disposition: Stable; discharge to home\    Full dictated report to follow    Barrett Patel MD  Interventional Radiology  [facility]  9:15 AM, 7/6/2021

## 2021-07-06 NOTE — PROGRESS NOTES
Pt transferred back to xray recovery via stretcher via [employer]. Pt laying onto right side on towel roll at biopsy site. Dressing noted clean/dry/intact without bleeding, swelling or pain.

## 2021-07-06 NOTE — PROGRESS NOTES
Total sedation medication:    Versed = 3 mg    Fentanyl = 100 mcg        Total sedation time:      [id number]

## 2021-07-06 NOTE — DISCHARGE INSTRUCTIONS
[facility]  Radiology Department  [phone]      Radiologist:  Dr. Chantell Murillo    Date:  July 6, 2021      Liver Biopsy Discharge Instructions      You may have an aching pain in the biopsy site tonight. Take Tylenol if allowed, as directed on the label, for pain or discomfort. Avoid ibuprofen (Advil, Motrin) and aspirin for the next 48 hours as these drugs may increase your risk of bleeding. Resume your previous diet and resume your prescribed medications. You have been given sedating medications today. Go home and rest.  Do not drive or make any important decisions. Avoid strenuous activity,  do not lift anything heavier than a small grocery bag (10 pounds) and avoid excessive twisting and reaching for the next 5 days. If you experience severe sweating, severe abdominal pain, dizziness or faintness, go to the nearest Emergency Room immediately. Pain under the left collar bone is normal.    Watch for signs of infection at biopsy site:  redness, pain, drainage, fever chills. If this occurs, call you doctor. Follow up with your ordering physician as previously discussed to receive results. If you have any questions or concerns, please call [phone] and ask to speak to a radiology nurse.

## 2021-07-06 NOTE — PROGRESS NOTES
Dr. Johny Kehr states if pt. Is stable throughout the hour post biopsy, she can be discharged in 1 hour.

## 2021-07-06 NOTE — ROUTINE PROCESS
Pt ambulated to xray recovery without difficulty accomp. By nurse. Pt awake, alert and oriented x 3. Pt denies pain.

## 2021-07-06 NOTE — PROGRESS NOTES
Pt sipping on ginger ale without difficulty per pt. Request.  Pt continuing to lay on right side with towel roll under biopsy site.

## 2021-07-06 NOTE — H&P
Radiology History and Physical    Patient: Marko Huynh 68 y.o. female       Chief Complaint: Hepatic masses    History of Present Illness: 68year old woman with history of renal cancer. Recently found to have multiple small hepatic masses. Presents for biopsy. History:    Past Medical History:   Diagnosis Date    Chronic kidney disease     Diabetes ([facility] [address]     Type II    History of kidney cancer 2020    stage 2, s/p nephrectomy, no further tx necessary    Hypercholesterolemia     Hypertension     Pleural effusion, left 2021    Psychiatric disorder     anxiety     Family History   Adopted: Yes   Problem Relation Age of Onset    Cancer Daughter         lung    Cancer Son         colon     Social History     Socioeconomic History    Marital status:      Spouse name: Not on file    Number of children: Not on file    Years of education: Not on file    Highest education level: Not on file   Occupational History    Not on file   Tobacco Use    Smoking status: Never Smoker    Smokeless tobacco: Never Used   Substance and Sexual Activity    Alcohol use: No     Alcohol/week: 0.0 standard drinks    Drug use: No    Sexual activity: Yes     Partners: Male     Birth control/protection: None   Other Topics Concern    Not on file   Social History Narrative    2 children  from cancer. Social Determinants of Health     Financial Resource Strain:     Difficulty of Paying Living Expenses:    Food Insecurity:     Worried About Running Out of Food in the Last Year:     [address] in the Last Year:    Transportation Needs:     Lack of Transportation (Medical):      Lack of Transportation (Non-Medical):    Physical Activity:     Days of Exercise per Week:     Minutes of Exercise per Session:    Stress:     Feeling of Stress :    Social Connections:     Frequency of Communication with Friends and Family:     Frequency of Social Gatherings with Friends and Family:     Attends [employer] Services:     Active Member of Clubs or Organizations:     Attends Club or Organization Meetings:     Marital Status:    Intimate Partner Violence:     Fear of Current or Ex-Partner:     Emotionally Abused:     Physically Abused:     Sexually Abused: Allergies: No Known Allergies    Current Medications:  Current Outpatient Medications   Medication Sig    furosemide (LASIX) 40 mg tablet Take 1 Tablet by mouth daily.  simvastatin (ZOCOR) 20 mg tablet TAKE 1 TABLET BY MOUTH EVERY NIGHT    metFORMIN (GLUCOPHAGE) 500 mg tablet TAKE 1 TABLET BY MOUTH TWICE A DAY WITH FOOD    glipiZIDE (GLUCOTROL) 5 mg tablet TAKE 1 TABLET BY MOUTH EVERY DAY    glucose blood VI test strips (blood glucose test) strip Pt tests daily. DX: E11.65, Please provide with one touch ultra test strips    amitriptyline (ELAVIL) 10 mg tablet Take 1 Tab by mouth nightly. Indications: sleep/anxiety    ramipriL (ALTACE) 10 mg capsule TAKE 2 CAPSULES BY MOUTH EVERY DAY    Blood-Glucose Meter misc Pt tests daily. Dx: E11.65    glucose blood VI test strips (ACCU-CHEK MARY GRACE) strip Diagnosis E11.65. Pt is testing once daily. Current Facility-Administered Medications   Medication Dose Route Frequency    midazolam (VERSED) injection 0-10 mg  0-10 mg IntraVENous Multiple    0.9% sodium chloride infusion  25 mL/hr IntraVENous CONTINUOUS    fentaNYL citrate (PF) injection 100 mcg  100 mcg IntraVENous Multiple        Physical Exam:  Blood pressure 135/73, pulse (!) 116, temperature 98.1 °F (36.7 °C), resp. rate 20, height 5' 5\" (1.651 m), weight 72.6 kg (160 lb), SpO2 99 %, not currently breastfeeding.   GENERAL: alert, cooperative, no distress, appears stated age,   LUNG: Nonlabored respiration on room air  HEART: regular rate and rhythm    Alerts:    Hospital Problems  Date Reviewed: 12/15/2020    None          Laboratory:    No results for input(s): HGB, HCT, WBC, PLT, INR, BUN, CREA, K, CRCLT, HGBEXT, HCTEXT, PLTEXT, INREXT in the last 72 hours. No lab exists for component: PTT, PT      Plan of Care/Planned Procedure:  Risks, benefits, and alternatives reviewed with patient and she agrees to proceed with the procedure. Ultrasound guided biopsy of hepatic lesion. Deemed appropriate for moderate sedation with versed and fentanyl.     Benny Chacko MD  Interventional Radiology  [facility]  8:31 AM, 7/6/2021

## 2021-07-08 NOTE — PROGRESS NOTES
Goals      Prevent complications post hospitalization. 06/28/21  CTN unable to reach patient on phone, LM on VM. --mkrw    06/29/21  CTN was able to reach pt today on phone to review discharge instructions/red flags to prevent complications. Appts:    PCP--6/30/21  DR Lisa---7/1/21    CT for liver bx---7/6/21    Echo to reevaluate effusion--7/26/21    Patient appts reviewed above with patient---she is aware of them and states she will attend.  At time of call, patient states that she is feeling better---she reports that she picked up her abx and lasix --is taking as directed. Patient states she is no longer SOB, denies coughing , fever, N/V, or pain/ CP. Patient states she does not have a home pulse ox but does have BP cuff. She has not taken BP today---CTN instructed her to monitor BP and HR and to call MD if she has consistent abnormal readings. CTN advised patient to buy pulse ox for home use.  Patient reports she has lower extremity edema however it improved a great deal while hospitalized. CTN instructed patient to elevate legs when sitting, and to ambulate when possible within her home or if she preferred outdoors to do so before it gets too hot. CTN instructed pt to call MD if she notices edema increasing in legs even on lasix. Pt verbalizes understanding.  Patient states she is \"just waiting now to see what I have to deal with\" referring to liver Bx. Patient asked CTN what to expect as she has not had a bx before---CTN discussed with patient a generalized overview of what would be done during procedure however informed her that she may receive specific instructions closer to time of procedure. Patient verbalizes understanding.  Patient tells CTN that both of her arms are very bruised as patient was a difficult stick and the IV's did not hold up that were placed. CTN assessed pt for cellulitis, she denies pain or redness on arms.  Pt states she will [facility]\". [address] information provided. Patient had no additional questions or needs at time of call. CTN will follow up next week---chacorta    07/08/21  CTN contacted patient who reports she is doing very well, has no needs or concerns at this time. Patient attended PCP appt on 6/30/21 and had liver bx on 7/6/21. Patient denies any problems at bx site and states procedure went well.     CTN will follow up in 10 days----chacorta

## 2021-07-13 NOTE — PROGRESS NOTES
Freya Stock is a 68 y.o. female new pt referred to provider for adenocarcinoma of liver. Pt accompanied by her spouse and a family friend to today's appt. Pt hs a h/o RCC; clear cell. Pt had a liver bx on 7/6. Pt denies pain. VS stable w the exception of an elevated pulse of 114.      Chief Complaint   Patient presents with    New Patient     Adenocarcinoma of liver

## 2021-07-13 NOTE — PROGRESS NOTES
[address] [facility] at [address], [place], [address] [facility]  W: [phone] F: [phone]      Reason for Visit:   Brooklynn Beck is a 68 y.o. female who is seen in consultation at the request of Dr. Kylah Barnes for evaluation of history of renal cell carcinoma, now with new liver lesions of unknown primary. Subsequent outpatient encounter    Hematology / Oncology Treatment History:     Hematological/Oncological Diagnosis:   1) Adenocarcinoma of the liver, primary unknown  2) Renal Cell Carcinoma, Clear cell type  8/2020 s/p Nephrectomy    Date of Diagnosis: 7/6/2021    Treatment course:   1) Liver biopsy completed July 6, 2021, positive for adenocarcinomaprimary thought to be cholangiocarcinoma versus primary pancreatic versus upper GI. History of Present Illness:       Very pleasant 68year old female with past medical history notable for hypertension, DM2, hyperlipidemia and a recent history of early stage renal cell carcinoma, s/p nephrectomy in August of 2020, who initially presented to the ED with complaints of shortness of breath, with subsequent imaging revealing large left sided pleural effusion in June 2021. The patient had thoracentesis on 6/22 with about 800 cc drained. Cytology was negative. She also has radiographic evidence of pericardial effusion and questionable pericardial implant but without signs of tamponade. Staging imaging was notable for multiple subcentimeter liver lesions concerning for metastases. Chest CT did not show clear evidence of primary malignancy though there was a possible pericardial implant noted on CT chest.    Clinically, the patient was doing fairly well with symptoms of progressive cough, shortness of breath as well as development of lower extremity edema. She was discharged from the hospital on was planned for outpatient liver biopsy.     Interval History:       Since hospital discharge, patient had biopsy of liver lesion that returned positive for adenocarcinoma with unclear primary site. Immunohistochemistry suggests that the primary may be from pancreatobiliary, cholangiocarcinoma, or upper GI. No corresponding lesion seen on available CT abdomen pelvis. Otherwise, clinically the patient reports improved overall functional status. She reports that she is able to ambulate independently and manages her own activities of daily living. She says that she does have some exertional dyspnea that occurs after walking multiple blocks. She denies any abdominal pain, nausea, vomiting, diarrhea. She does have some decreased appetite and prior notes notes some weight loss. No hematemesis or worsening cough reported. Lower extremity edema is much improved since hospital discharge. Positive ROS findings include: Cough, shortness of breath, edema  Review of Systems: A complete review of systems was obtained, negative except as described above.     Past Medical History:   Diagnosis Date    Chronic kidney disease     Diabetes ([address]     Type II    History of kidney cancer 06/2020    stage 2, s/p nephrectomy, no further tx necessary    Hypercholesterolemia     Hypertension     Pleural effusion, left 6/23/2021    Psychiatric disorder     anxiety      Past Surgical History:   Procedure Laterality Date    COLONOSCOPY N/A 12/10/2019    COLONOSCOPY performed by Juanita [facility]born [facility] at [facility] ENDOSCOPY    HX CATARACT REMOVAL Bilateral 2015    HX CATARACT REMOVAL  01/09/2018    secondary cataracts removed    HX CHOLECYSTECTOMY  1993    HX DILATION AND CURETTAGE  2000    HX HYSTERECTOMY  2000    complete    HX NEPHRECTOMY Right 2020    stage 2 cancer, no further tx needed      Social History     Tobacco Use    Smoking status: Never Smoker    Smokeless tobacco: Never Used   Substance Use Topics    Alcohol use: No     Alcohol/week: 0.0 standard drinks      Family History   Adopted: Yes   Problem Relation Age of Onset    Cancer Daughter         lung    Cancer Son         colon     Current Outpatient Medications   Medication Sig    furosemide (LASIX) 40 mg tablet Take 1 Tablet by mouth daily.  simvastatin (ZOCOR) 20 mg tablet TAKE 1 TABLET BY MOUTH EVERY NIGHT    metFORMIN (GLUCOPHAGE) 500 mg tablet TAKE 1 TABLET BY MOUTH TWICE A DAY WITH FOOD    glipiZIDE (GLUCOTROL) 5 mg tablet TAKE 1 TABLET BY MOUTH EVERY DAY    glucose blood VI test strips (blood glucose test) strip Pt tests daily. DX: E11.65, Please provide with one touch ultra test strips    amitriptyline (ELAVIL) 10 mg tablet Take 1 Tab by mouth nightly. Indications: sleep/anxiety    ramipriL (ALTACE) 10 mg capsule TAKE 2 CAPSULES BY MOUTH EVERY DAY    Blood-Glucose Meter misc Pt tests daily. Dx: E11.65    glucose blood VI test strips (ACCU-CHEK MARY GRACE) strip Diagnosis E11.65. Pt is testing once daily. No current facility-administered medications for this visit. No Known Allergies         Physical Exam:     Visit Vitals  /79   Pulse (!) 114   Temp 97.7 °F (36.5 °C) (Oral)   Resp 18   Ht 5' 5\" (1.651 m)   Wt 159 lb 3.2 oz (72.2 kg)   LMP  (LMP Unknown)   SpO2 96%   BMI 26.49 kg/m²     ECOG PS: 0-1  Physical Exam  Constitutional:       Appearance: Normal appearance. She is not ill-appearing or toxic-appearing. HENT:      Head: Normocephalic. Nose: Nose normal. No congestion or rhinorrhea. Mouth/Throat:      Mouth: Mucous membranes are moist.      Pharynx: No oropharyngeal exudate or posterior oropharyngeal erythema. Eyes:      General: No scleral icterus. Pupils: Pupils are equal, round, and reactive to light. Cardiovascular:      Rate and Rhythm: Regular rhythm. Tachycardia present. Pulses: Normal pulses. Heart sounds: Normal heart sounds. No murmur heard. No friction rub. No gallop.        Comments: Heart sounds are NOT muffled    Pulmonary:      Effort: Pulmonary effort is normal. No respiratory distress. Breath sounds: Normal breath sounds. No stridor. No wheezing, rhonchi or rales. Chest:      Chest wall: No tenderness. Abdominal:      General: Abdomen is flat. There is no distension. Palpations: Abdomen is soft. There is no mass. Tenderness: There is no abdominal tenderness. There is no guarding or rebound. Hernia: No hernia is present. Musculoskeletal:         General: Deformity (lipoma in middle of back) present. No swelling or tenderness. Normal range of motion. Cervical back: Normal range of motion. No rigidity or tenderness. Right lower leg: Edema (1+ right ankle edema) present. Left lower leg: No edema. Skin:     General: Skin is warm and dry. Coloration: Skin is not jaundiced or pale. Findings: No bruising, erythema, lesion or rash. Neurological:      General: No focal deficit present. Mental Status: She is alert and oriented to person, place, and time. Mental status is at baseline. Cranial Nerves: No cranial nerve deficit. Sensory: No sensory deficit. Motor: No weakness. Coordination: Coordination normal.      Gait: Gait normal.   Psychiatric:         Mood and Affect: Mood normal.         Behavior: Behavior normal.         Thought Content: Thought content normal.         Judgment: Judgment normal.             Results:     Lab Results   Component Value Date/Time    WBC 7.0 06/24/2021 03:09 AM    HGB 8.3 (L) 06/24/2021 03:09 AM    HCT 28.1 (L) 06/24/2021 03:09 AM    PLATELET 871 44/76/9056 03:09 AM    MCV 81.0 06/24/2021 03:09 AM    ABS.  NEUTROPHILS 3.9 06/24/2021 03:09 AM     Lab Results   Component Value Date/Time    Sodium 136 06/25/2021 09:38 AM    Potassium 3.6 06/25/2021 09:38 AM    Chloride 103 06/25/2021 09:38 AM    CO2 25 06/25/2021 09:38 AM    Glucose 185 (H) 06/25/2021 09:38 AM    BUN 22 (H) 06/25/2021 09:38 AM    Creatinine 1.26 (H) 06/25/2021 09:38 AM    GFR est AA 50 (L) 06/25/2021 09:38 AM    GFR est non-AA 41 (L) 06/25/2021 09:38 AM    Calcium 7.9 (L) 06/25/2021 09:38 AM    Glucose (POC) 130 (H) 06/25/2021 11:56 AM    Creatinine (POC) 0.8 07/22/2020 12:36 PM     Lab Results   Component Value Date/Time    Bilirubin, total 0.5 06/25/2021 09:38 AM    ALT (SGPT) 22 06/25/2021 09:38 AM    Alk. phosphatase 159 (H) 06/25/2021 09:38 AM    Protein, total 5.6 (L) 06/25/2021 09:38 AM    Albumin 2.2 (L) 06/25/2021 09:38 AM    Globulin 3.4 06/25/2021 09:38 AM         CT abdomen/pelvis dated 6/23/21- notable for multiple subcm liver lesions, greatest of which is 1 cm in segment 6. Ground glass opacities in bilateral lungs, moderate pericardial effusion. CT thorax without contrast 6/22/21-  Thyroid nodule,2.6 cm x 1.0 cm -- somewhat asymetric. Possible pericardial implant with moderate pericardial effusion. Left pleural transudate. Left lower lobe consolidation, right lower lobe patchy consolidation. Right hepatic segment 7 lesion            Assessment     # Adenocarcinoma of the Liver, suspicious for metastasis from unknown primary -- IHC suggests pancreaticobiliary vs. Upper GI source. - Will plan for GI evaluation for urgent ERCP/EGD for identification of possible primary site. - Will schedule PET scan for discerning extent to which pleural nodule and pericardial implant are likely to be involved in disease process. - Pathology from thoracentesis is negative  - will check CEA and CA 19-9 today for concern of pancreatic primary.   - NGS on FNA of liver is pending.   - Brain MRI negative. # History of early stage Renal Cell Carcinoma,  - Current liver findings are unrelated to [address]. Recommendations:     >> Plan for PET scan ASAP  >> GI consult for ERCP/EUS  >> NGS on liver sample  >> Return in 2 weeks for treatment planning based on PET scan.      Signed By: Johny Walton MD      Attending Medical Oncologist   [facility]

## 2021-07-14 NOTE — PROGRESS NOTES
Oncology Social Work  Psychosocial Assessment    Reason for Assessment:      [] Social Work Referral [x] Initial Assessment [x] New Diagnosis [] Other    Advance Care Planning:  Advance Care Planning 6/22/2021   Confirm Advance Directive Yes, on file       Sources of Information:    [x]Patient  [x]Family  [x]Staff  [x]Medical Record    Mental Status:    [x]Alert  []Lethargic  []Unresponsive   [] Unable to assess   Oriented to:  [x]Person  [x]Place  [x]Time  [x]Situation      Relationship Status:  []Single  [x]  []Significant Other/Life Partner  []  []  []    Living Circumstances:  []Lives Alone  [x]Family/Significant Other in Household  []Roommates  []Children in the Home  []Paid Caregivers  []Assisted Living Facility/Group [address]  []Homeless  []Incarcerated  []Environmental/Care Concerns  []Other:    Employment Status:  []Employed Full-time []Employed Part-time []Homemaker  [x] Retired [] Short-Term Disability [] [place]  [] Unemployed     Barriers to Learning:    []Language  []Developmental  []Cognitive  []Altered Mental Status  []Visual/Hearing Impairment  []Unable to Read/Write  []Motivational  [] Challenges Understanding Medical Jargon [x]No Barriers Identified      Financial/Legal Concerns:    []Uninsured  []Limited Income/Resources  []Non-Citizen  []Food Insecurity [x]No Concerns Identified   []Other:    Holiness/Spiritual/Existential:  Does patient have any spiritual or Pentecostal beliefs? [x] Yes [] No  Is the patient involved in a spiritual, anastasia or Pentecostal community?  [] Yes [x] No  Patient expressing spiritual/existential angst? [] Yes [x] No  Notes:    Support System:    Identified Support Person/Group:  []Strong  []Fair  [x]Limited    Coping with Illness:   [x]  Coping Well  [] Challenges Coping with Serious Illness [] Situational Depression [] Situational Anxiety [] Anticipatory Grief  [] Recent Loss [] Caregiver Thomson Narrative: Met with patient and her  of 35 years, Fernando Rivera,  to introduce social work navigator role and supports. Family friend and nurse, Gil Ty, also with couple for the appointment. Pt will need a PET Scan, see a GI specialist for a scope, and next generation testing. Couple do not have any children. Couple has a place at the river[place]water they enjoy. 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 Plan:  1. Introduce self and role of the  in the [employer]. 2. Informed the patient of the [facility] and available resources there. 3. Continue to meet with the patient when she returns to the clinic for ongoing assessment of the patient's adjustment to her diagnosis and treatment. 4. Ongoing psychosocial support as desired by patient. Referral:   Complementary therapies referral      Mona Delacruz.  Seema Quintana, CAIT/LMSW  Supervisee in Social Work

## 2021-07-14 NOTE — PROGRESS NOTES
Pt will go for a consultation for EGD/ERCP this Friday 7/19/21 at 1030 with NP Seema Canales at [facility] [address]. Arrive at 10am for 1030am appointment. Pre-register at [phone].

## 2021-07-16 NOTE — TELEPHONE ENCOUNTER
Identified patient 2 identifiers verified. Patient started on Lasix on 6/25/21. Spoke with patient she counted her pills and she has 13 pills left. No refill needed at this time.

## 2021-07-19 NOTE — TELEPHONE ENCOUNTER
Spoke with Anny Ramirez regarding this pt. He is aware about the Endoscopy appointment date and time and also about the visit this past Friday. We will have more information tomorrow with the PET scan so lets hold on to the current follow up appt. Until results are back from PET. Called pt. HIPAA verified by two patient identifiers. Pt is aware. Our office will reach out once PET results are back to determine next steps.

## 2021-07-19 NOTE — TELEPHONE ENCOUNTER
Patient called, Patient stated that her  Endoscopy is scheduled on 07/29/21 & apt in office is on 07/26/21. Patient concerned if she should r/s apt for after endoscopy.

## 2021-07-21 NOTE — PROGRESS NOTES
Discussed results of patient's PET scan with patient over the phone. We will plan for IR guided port placement for likely intravenous chemotherapy administration in the future.

## 2021-07-22 NOTE — TELEPHONE ENCOUNTER
Nurse called [facility] and spoke w/ Nilo Hopkins who informed nurse that the EGD was canceled d/t add on of colonoscopy and because appt was scheduled in error. [address] [facility] informed nurse that Dr. Lissa Beck was in the office today and she will let him know to call us back because we need pt's EGD and colonoscopy scheduled ASAP     Return call received from pt. Pt informed EGD was cancelled d/t provider requesting a colonoscopy along w/ the EGD so therefore the appt date was made in error.

## 2021-07-22 NOTE — TELEPHONE ENCOUNTER
Patient said she was supposed to be having an Endoscopy. Had the intitial appt with Physician and it was scheduled for the 29th. She called  back to confirm and was told that their nurse had cancelled the 29th  appt. She stated they were investigating what happened but wanted us to please find out what happened and why.

## 2021-07-26 NOTE — DISCHARGE INSTRUCTIONS
[address]  Angiography Department      Radiologist: Robertozheng Macias      Date: 7/26/2021      [facility] Discharge Instructions      Watch for signs of infection:    1. Redness,   2. Fever, chills,   3. Increased pain, and/or drainage from the site. If this occurs, call your physician at once. Return next week for a [employer] check:       Do not register. Proceed to the Radiology Waiting Area and let the  know you are here for a \"PORT site check\". If you have an appointment with a provider or at the infusion center in the upcoming week,  they may check your site and change the dressing for you. Keep your dressing clean and dry. Leave the dressing in place until seen here next week. Continue your previous diet and restart your regularly prescribed medications. You may take Tylenol, as directed on the label, for pain if needed. Avoid ibuprofen (Advil, Motrin) and aspirin as they may cause you to bleed. Because you received sedation, you are not to drive or sign any legal documents for the next 24 hours. Do not lift anything heavier than 5 pounds with the affected arm and avoid pushing and pulling movements for several days. If you have any questions or concerns, please call our radiology department at [phone].

## 2021-07-26 NOTE — PROGRESS NOTES
Patient has graduated from the Transitions of Care Coordination  program on 7/25/2021. Patient/family has the ability to self-manage at this time Care management goals have been completed. Patient was not referred to the [facility] team for further management. Goals Addressed                 This Visit's Progress     COMPLETED: Prevent complications post hospitalization. 06/28/21  CTN unable to reach patient on phone, LM on . --mkrw    06/29/21  CTN was able to reach pt today on phone to review discharge instructions/red flags to prevent complications. Appts:    PCP--6/30/21  DR Lisa---7/1/21    CT for liver bx---7/6/21    Echo to reevaluate effusion--7/26/21    Patient appts reviewed above with patient---she is aware of them and states she will attend.  At time of call, patient states that she is feeling better---she reports that she picked up her abx and lasix --is taking as directed. Patient states she is no longer SOB, denies coughing , fever, N/V, or pain/ CP. Patient states she does not have a home pulse ox but does have BP cuff. She has not taken BP today---CTN instructed her to monitor BP and HR and to call MD if she has consistent abnormal readings. CTN advised patient to buy pulse ox for home use.  Patient reports she has lower extremity edema however it improved a great deal while hospitalized. CTN instructed patient to elevate legs when sitting, and to ambulate when possible within her home or if she preferred outdoors to do so before it gets too hot. CTN instructed pt to call MD if she notices edema increasing in legs even on lasix. Pt verbalizes understanding.  Patient states she is \"just waiting now to see what I have to deal with\" referring to liver Bx.  Patient asked CTN what to expect as she has not had a bx before---CTN discussed with patient a generalized overview of what would be done during procedure however informed her that she may receive specific instructions closer to time of procedure. Patient verbalizes understanding.  Patient tells CTN that both of her arms are very bruised as patient was a difficult stick and the IV's did not hold up that were placed. CTN assessed pt for cellulitis, she denies pain or redness on arms. Pt states she will ask her PCP to look at arms at appt \"to make sure\". [address] information provided. Patient had no additional questions or needs at time of call. CTN will follow up next week---[facility]    07/08/21  CTN contacted patient who reports she is doing very well, has no needs or concerns at this time. Patient attended PCP appt on 6/30/21 and had liver bx on 7/6/21. Patient denies any problems at bx site and states procedure went well. CTN will follow up in 10 days----[facility]    07/26/21  Chart review performed---patient has seen oncology, has had PET scan. Liver bx reveals pt positive for liver cancer. Patient plan is for EGD to find source, having catheter placed today. Patient communicates with MD for questions and concerns , has been compliant with treatments. Patient has had no evidence of readmission during [facility]. ---[facility]              Patient has Care Transition Nurse's contact information for any further questions, concerns, or needs.   Patients upcoming visits:    Future Appointments   Date Time Provider Sindi Parekh   [facility]26/2021  [facility] [address]   8/2/2021  1:30 PM Neelam Martinez MD [facility]/DANN LINDSAY AMB   12/20/2021 11:30 AM Rani Castro III, DO [facility] BS AMB

## 2021-07-26 NOTE — PROGRESS NOTES
Name of procedure: Port Placement    Sedation medications given: Versed 3 mg, Fentanyl 100 mcg    Sedation tolerated: Well    Total Sedation time: 15 minutes    Vital Signs: Stable    Fluids removed: None    Samples sent to lab: No    Any complications related to procedure: None    Post Procedure Care Needed/order sets placed in [facility].

## 2021-07-26 NOTE — H&P
Interventional and Vascular Radiology History and Physical    Patient: Nando Vinson 68 y.o. female       Chief Complaint: No chief complaint on file. History of Present Illness: chemotherapy     History:    Past Medical History:   Diagnosis Date    Chronic kidney disease     Diabetes ([facility] [address]     Type II    History of kidney cancer 2020    stage 2, s/p nephrectomy, no further tx necessary    Hypercholesterolemia     Hypertension     Pleural effusion, left 2021    Psychiatric disorder     anxiety     Family History   Adopted: Yes   Problem Relation Age of Onset    Cancer Daughter         lung    Cancer Son         colon     Social History     Socioeconomic History    Marital status:      Spouse name: Not on file    Number of children: Not on file    Years of education: Not on file    Highest education level: Not on file   Occupational History    Not on file   Tobacco Use    Smoking status: Never Smoker    Smokeless tobacco: Never Used   Substance and Sexual Activity    Alcohol use: No     Alcohol/week: 0.0 standard drinks    Drug use: No    Sexual activity: Yes     Partners: Male     Birth control/protection: None   Other Topics Concern    Not on file   Social History Narrative    2 children  from cancer. Social Determinants of Health     Financial Resource Strain:     Difficulty of Paying Living Expenses:    Food Insecurity:     Worried About Running Out of Food in the Last Year:     [address] in the Last Year:    Transportation Needs:     Lack of Transportation (Medical):      Lack of Transportation (Non-Medical):    Physical Activity:     Days of Exercise per Week:     Minutes of Exercise per Session:    Stress:     Feeling of Stress :    Social Connections:     Frequency of Communication with Friends and Family:     Frequency of Social Gatherings with Friends and Family:     Attends Orthodox Services:     Active Member of Clubs or Organizations:     Attends Club or Organization Meetings:     Marital Status:    Intimate Partner Violence:     Fear of Current or Ex-Partner:     Emotionally Abused:     Physically Abused:     Sexually Abused: Allergies: No Known Allergies    Current Medications:  Current Outpatient Medications   Medication Sig    azelastine (ASTELIN) 137 mcg (0.1 %) nasal spray PLACE 1 SPRAY IN BOTH NOSTRILS TWICE A DAY AS NEEDED FOR RHINITIS    furosemide (LASIX) 40 mg tablet Take 1 Tablet by mouth daily.  simvastatin (ZOCOR) 20 mg tablet TAKE 1 TABLET BY MOUTH EVERY NIGHT    metFORMIN (GLUCOPHAGE) 500 mg tablet TAKE 1 TABLET BY MOUTH TWICE A DAY WITH FOOD    glipiZIDE (GLUCOTROL) 5 mg tablet TAKE 1 TABLET BY MOUTH EVERY DAY    glucose blood VI test strips (blood glucose test) strip Pt tests daily. DX: E11.65, Please provide with one touch ultra test strips    amitriptyline (ELAVIL) 10 mg tablet Take 1 Tab by mouth nightly. Indications: sleep/anxiety    ramipriL (ALTACE) 10 mg capsule TAKE 2 CAPSULES BY MOUTH EVERY DAY    Blood-Glucose Meter misc Pt tests daily. Dx: E11.65    glucose blood VI test strips (ACCU-CHEK MARY GRACE) strip Diagnosis E11.65. Pt is testing once daily. Current Facility-Administered Medications   Medication Dose Route Frequency    fentaNYL citrate (PF) injection 100 mcg  100 mcg IntraVENous Multiple    0.9% sodium chloride infusion  25 mL/hr IntraVENous CONTINUOUS    midazolam (PF) (VERSED) 1 mg/mL injection 5 mg  5 mg IntraVENous Rad Multiple        Physical Exam:  Blood pressure (!) 119/56, pulse (!) 106, temperature 98.2 °F (36.8 °C), resp. rate 14, height 5' 5\" (1.651 m), weight 72.6 kg (160 lb), SpO2 100 %, not currently breastfeeding.   LUNG: clear to auscultation bilaterally, HEART: regular rate and rhythm, S1, S2 normal, no murmur, click, rub or gallop      Alerts:    Hospital Problems  Date Reviewed: 12/15/2020    None          Laboratory:    No results for input(s): HGB, HCT, WBC, PLT, INR, BUN, CREA, K, CRCLT, HGBEXT, HCTEXT, PLTEXT, INREXT in the last 72 hours. No lab exists for component: PTT, PT      Plan of Care/Planned Procedure:  Risks, benefits, and alternatives reviewed with patient and she agrees to proceed with the procedure. Conscious sedation will be performed with IV fentanyl and versed.  Plan is for chest port placement       Darlene Goldberg MD

## 2021-07-26 NOTE — TELEPHONE ENCOUNTER
Call placed to pt. Voicemail verified pt.  Nurse left a message inquiring if pt received her new appt w/ gastro for her EGD    Pt's new appt w/ Gastro for EGD is 7/29 @ 3:30pm. Pt may want to call to confirm appt w/ gastro @ [phone]

## 2021-07-26 NOTE — PROGRESS NOTES
Patient sitting up in bed drinking ginger ale with  at bedside.  Pt denies drowsiness, dizziness or SOB

## 2021-07-27 NOTE — TELEPHONE ENCOUNTER
----- Message from March Zelda sent at 7/27/2021  9:52 AM EDT -----  Regarding: Dr Mazariegos Courser (if not patient):Pt      Relationship of caller (if not patient): Pt      Best contact number(s):[phone]      Name of medication and dosage if known:Lasix 40 mg 1x/day      Is patient out of this medication (yes/no):no      Pharmacy name:[facility]    Pharmacy listed in chart? (yes/no)yes:  Pharmacy phone number: [phone]      Details to clarify the request:Pt is requesting a refill for Lasix 40 mg 1x/day to be called tot he pharmacy on file      Message from [facility]

## 2021-07-28 NOTE — TELEPHONE ENCOUNTER
PCP: Keny Hector DO    Last appt: 6/30/2021  Future Appointments   Date Time Provider Sindi Parekh   8/2/2021  1:30 PM Gino Victor MD [facility] BS AMB   12/20/2021 11:30 AM Radha Gomez, DO [facility] BS AMB       Requested Prescriptions     Pending Prescriptions Disp Refills    furosemide (LASIX) 40 mg tablet 30 Tablet 0     Sig: Take 1 Tablet by mouth daily.        Prior labs and Blood pressures:  BP Readings from Last 3 Encounters:   07/26/21 (!) 119/56   07/13/21 125/79   07/06/21 (!) 102/53     Lab Results   Component Value Date/Time    Sodium 135 (L) 07/13/2021 04:13 PM    Potassium 5.3 (H) 07/13/2021 04:13 PM    Chloride 103 07/13/2021 04:13 PM    CO2 24 07/13/2021 04:13 PM    Anion gap 8 07/13/2021 04:13 PM    Glucose 184 (H) 07/13/2021 04:13 PM    BUN 35 (H) 07/13/2021 04:13 PM    Creatinine 1.56 (H) 07/13/2021 04:13 PM    BUN/Creatinine ratio 22 (H) 07/13/2021 04:13 PM    GFR est AA 39 (L) 07/13/2021 04:13 PM    GFR est non-AA 32 (L) 07/13/2021 04:13 PM    Calcium 9.0 07/13/2021 04:13 PM     Lab Results   Component Value Date/Time    Hemoglobin A1c 6.8 (H) 06/21/2021 12:35 PM    Hemoglobin A1c (POC) 7.3 (A) 01/15/2018 02:37 PM     Lab Results   Component Value Date/Time    Cholesterol, total 124 06/21/2021 12:35 PM    HDL Cholesterol 33 06/21/2021 12:35 PM    LDL, calculated 50.6 06/21/2021 12:35 PM    VLDL, calculated 40.4 06/21/2021 12:35 PM    Triglyceride 202 (H) 06/21/2021 12:35 PM    CHOL/HDL Ratio 3.8 06/21/2021 12:35 PM     No results found for: Bonnye Kleine, [id number]    Lab Results   Component Value Date/Time    TSH 3.53 06/21/2021 12:35 PM

## 2021-07-30 NOTE — PROGRESS NOTES
Golden Cardona is a 68 y.o. female here for follow up for renal cell carcinoma. 1. Have you been to the ER, urgent care clinic since your last visit? Hospitalized since your last visit? no    2. Have you seen or consulted any other health care providers outside of the [address] [place] [address] since your last visit? Include any pap smears or colon screening. no    No complaints.

## 2021-08-02 NOTE — PROGRESS NOTES
Per SHARIF from  capecitabine (xeloda) 1000mg/m2 BID for 2 weeks on and 1 week off.  Dispense 56 tablets of 150mg R-11 and 84 tablets of 500mg tablets R 11

## 2021-08-04 NOTE — PROGRESS NOTES
[facility] at [address], [place], [address] [facility]  W: [phone] F: [phone]      Reason for Visit:   Scott Solares is a 68 y.o. female who is seen in consultation at the request of Dr. 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 No other clear sites of disease  3) EGD completed 7/29/21 -showed a partially obstructing cratered gastric ulcer in the prepyloric region of the stomach. A second large ulcerated partially circumferential mass with no bleeding was found in the prepyloric region of the stomach as well, highly suspicious for gastric malignancy. Colonoscopy shows diverticulosis only       History of Present Illness:       Very pleasant 68year old female with past medical history notable for hypertension, DM2, hyperlipidemia and a recent history of early stage renal cell carcinoma, s/p nephrectomy in August of 2020, who initially presented to the ED with complaints of shortness of breath, with subsequent imaging revealing large left sided pleural effusion in June 2021. The patient had thoracentesis on 6/22 with about 800 cc drained. Cytology was negative. She also has radiographic evidence of pericardial effusion and questionable pericardial implant but without signs of tamponade.      Staging imaging was notable for multiple subcentimeter liver lesions concerning for metastases. Chest CT did not show clear evidence of primary malignancy though there was a possible pericardial implant noted on CT chest.    Clinically, the patient was doing fairly well with symptoms of progressive cough, shortness of breath as well as development of lower extremity edema. She was discharged from the hospital on was planned for outpatient liver biopsy. Since hospital discharge, patient had biopsy of liver lesion that returned positive for adenocarcinoma with unclear primary site. Immunohistochemistry suggests that the primary may be from pancreatobiliary, cholangiocarcinoma, or upper GI. No corresponding lesion seen on available CT abdomen pelvis. Interval History:     The patient tolerated EGD well and is recovering from procedure. She has been having some depression though also this has been complicated by fatigue likely from malignancy. Otherwise, no sources of pain. She does have mild shortness of breath on exertion. No other constitutional symptoms reported. Positive ROS findings include: Some lower extremity edema, fatigue    Review of Systems: A complete review of systems was obtained, negative except as described above.     Past Medical History:   Diagnosis Date    Chronic kidney disease     Diabetes ([facility] [address]     Type II    History of kidney cancer 06/2020    stage 2, s/p nephrectomy, no further tx necessary    Hypercholesterolemia     Hypertension     Pleural effusion, left 6/23/2021    Psychiatric disorder     anxiety      Past Surgical History:   Procedure Laterality Date    COLONOSCOPY N/A 12/10/2019    COLONOSCOPY [facility] at [facility] ENDOSCOPY    HX CATARACT REMOVAL Bilateral 2015    HX CATARACT REMOVAL  01/09/2018    secondary cataracts removed    HX CHOLECYSTECTOMY  1993    HX DILATION AND CURETTAGE  2000    HX HYSTERECTOMY  2000    complete    HX NEPHRECTOMY Right 2020    stage 2 cancer, no further tx needed    IR INSERT TUNL CVC W PORT OVER 5 YEARS  7/26/2021      Social History     Tobacco Use    Smoking status: Never Smoker    Smokeless tobacco: Never Used   Substance Use Topics    Alcohol use: No     Alcohol/week: 0.0 standard drinks      Family History   Adopted: Yes   Problem Relation Age of Onset    Cancer Daughter         lung    Cancer Son         colon     Current Outpatient Medications   Medication Sig    lidocaine-prilocaine (EMLA) topical cream Apply  to affected area as needed for Pain.  prochlorperazine (COMPAZINE) 10 mg tablet Take 0.5 Tablets by mouth every six (6) hours as needed for Nausea.  capecitabine (XELODA) 150 mg tablet 2 Tablets two (2) times daily (after meals) with 1 other capecitabine prescription for 1,800 mg total. Take 1800mg by mouth twice a day for 14 days on and 7 days off    capecitabine (XELODA) 500 mg tablet 3 Tablets two (2) times daily (after meals) with 1 other capecitabine prescription for 1,800 mg total. Take 1800mg by mouth twice a day for 14 days on and 7 days off    ondansetron (ZOFRAN ODT) 4 mg disintegrating tablet Take 1 Tablet by mouth every eight (8) hours as needed for Nausea or Vomiting.  furosemide (LASIX) 40 mg tablet Take 1 Tablet by mouth daily.  azelastine (ASTELIN) 137 mcg (0.1 %) nasal spray PLACE 1 SPRAY IN BOTH NOSTRILS TWICE A DAY AS NEEDED FOR RHINITIS    simvastatin (ZOCOR) 20 mg tablet TAKE 1 TABLET BY MOUTH EVERY NIGHT    metFORMIN (GLUCOPHAGE) 500 mg tablet TAKE 1 TABLET BY MOUTH TWICE A DAY WITH FOOD    glipiZIDE (GLUCOTROL) 5 mg tablet TAKE 1 TABLET BY MOUTH EVERY DAY    glucose blood VI test strips (blood glucose test) strip Pt tests daily. DX: E11.65, Please provide with one touch ultra test strips    amitriptyline (ELAVIL) 10 mg tablet Take 1 Tab by mouth nightly.  Indications: sleep/anxiety    ramipriL (ALTACE) 10 mg capsule TAKE 2 CAPSULES BY MOUTH EVERY DAY    Blood-Glucose Meter misc Pt tests daily. Dx: E11.65    glucose blood VI test strips (ACCU-CHEK MARY GRACE) strip Diagnosis E11.65. Pt is testing once daily. No current facility-administered medications for this visit. No Known Allergies         Physical Exam:     Visit Vitals  /78 (BP 1 Location: Left upper arm, BP Patient Position: Sitting)   Pulse (!) 118   Temp 98.4 °F (36.9 °C) (Temporal)   Ht 5' 5\" (1.651 m)   Wt 154 lb (69.9 kg)   LMP  (LMP Unknown)   SpO2 95%   BMI 25.63 kg/m²     ECOG PS: 0-1  General: alert, cooperative, no distress   Mental  status: normal mood, behavior, speech, dress, motor activity, and thought processes, able to follow commands   HENT: NCAT   Neck: no visualized mass   Resp: no respiratory distress   Neuro: no gross deficits   Skin: no discoloration or lesions of concern on visible areas   Psychiatric: normal affect, consistent with stated mood, no evidence of hallucinations             Results:     Lab Results   Component Value Date/Time    WBC 9.2 07/13/2021 04:13 PM    HGB 10.1 (L) 07/13/2021 04:13 PM    HCT 35.3 07/13/2021 04:13 PM    PLATELET 160 (H) 17/64/5845 04:13 PM    MCV 85.1 07/13/2021 04:13 PM    ABS. NEUTROPHILS 6.8 07/13/2021 04:13 PM     Lab Results   Component Value Date/Time    Sodium 135 (L) 07/13/2021 04:13 PM    Potassium 5.3 (H) 07/13/2021 04:13 PM    Chloride 103 07/13/2021 04:13 PM    CO2 24 07/13/2021 04:13 PM    Glucose 184 (H) 07/13/2021 04:13 PM    BUN 35 (H) 07/13/2021 04:13 PM    Creatinine 1.56 (H) 07/13/2021 04:13 PM    GFR est AA 39 (L) 07/13/2021 04:13 PM    GFR est non-AA 32 (L) 07/13/2021 04:13 PM    Calcium 9.0 07/13/2021 04:13 PM    Glucose (POC) 129 (H) 07/20/2021 11:49 AM    Creatinine (POC) 0.8 07/22/2020 12:36 PM     Lab Results   Component Value Date/Time    Bilirubin, total 0.4 07/13/2021 04:13 PM    ALT (SGPT) 28 07/13/2021 04:13 PM    Alk.  phosphatase 168 (H) 07/13/2021 04:13 PM    Protein, total 6.3 (L) 07/13/2021 04:13 PM    Albumin 2.7 (L) 07/13/2021 04:13 PM    Globulin 3.6 07/13/2021 04:13 PM         CT abdomen/pelvis dated 6/23/21- notable for multiple subcm liver lesions, greatest of which is 1 cm in segment 6. Ground glass opacities in bilateral lungs, moderate pericardial effusion. CT thorax without contrast 6/22/21-  Thyroid nodule,2.6 cm x 1.0 cm -- somewhat asymetric. Possible pericardial implant with moderate pericardial effusion. Left pleural transudate. Left lower lobe consolidation, right lower lobe patchy consolidation. Right hepatic segment 7 lesion            Assessment     # Metastatic gastric adenocarcinoma with metastasis to the Liver, HER2+  -Tempus next generation sequencing shows high TMB as well as HER-2 overexpression.  -Today we discussed treatment options for metastatic gastric adenocarcinoma that is HER-2 positive. Specifically, we reviewed the preliminary results from the keynote 811 study that compared pembrolizumab plus trastuzumab plus chemotherapy versus trastuzumab plus chemotherapy alone. We discussed that the overall response rate was significantly higher in the pembrolizumab at 74% compared to 52% prompting accelerated approval from the FDA just 2 months ago on May 5, 2021. I reviewed the side effect profile from these medications and discussed that while overall survival and progression free survival data is not yet available from this trial, there are indications that PFS should correlate with the known improvement in overall response rates. -Furthermore, we discussed chemotherapy options including FOLFOX versus CapeOx. After discussion, the patient wishes to pursue with CapeOx plus Herceptin plus Keytruda. -Detailed instructions and side effects profiles for CAPEOX plus Herceptin plus Keytruda given to patient. Informational packet regarding chemotherapy was also given to the patient. Consent was obtained to start chemotherapy.  -Since last visit, the patient did have a port placed.   It is clean and appears to be ready for use. -We will order capecitabine 1 g twice daily today. And we will plan for start of chemotherapy within the next 1 to 2 weeks. # History of early stage Renal Cell Carcinoma,  - Current liver findings are unrelated to [address].      Recommendations:     >> Plan for start of chemotherapy within the next 1 to 2 weeks    Signed By: Erasmo Romo MD      Attending Medical Oncologist   [facility]

## 2021-08-06 NOTE — TELEPHONE ENCOUNTER
[address] [facility], [address] [facility]   W: [phone]  F: [phone]      Medical Nutrition Therapy  Nutrition Referral:    Referral received from [facility]. Patient with metastatic gastric adenocarcinoma. Called patient and explained that RD is available to address nutrition throughout the spectrum of care. She reports tolerating food and liquids without difficulty. Discussed possible nutrition related side effects and that may make eating difficult. Discussed importance of nutrition and minimizing weight changes. Encouraged her to reach out for any nutrition related questions or concerns. Provided her with contact information and will send her a [employer] message as an additional way to contact RD.       Signed By: Alan Ring, [address], [address] [facility] [address]

## 2021-08-06 NOTE — TELEPHONE ENCOUNTER
Patient called re. Is waiting to hear from the pharmacy. Was supposed to have received a call today for shipping information but has not heard anything.

## 2021-08-09 NOTE — TELEPHONE ENCOUNTER
Called [facility] and had to resubmit prescription with only gastric cancer on the prescription as this is an approved code. C16.9. For some reason they did NOT see this on her previous prescription. Forwarded prescription for xeloda with c16.9 code only.

## 2021-08-10 NOTE — TELEPHONE ENCOUNTER
[facility] Navigator Encounter     8/10/2021- Per [facility], benefit investigation has been completed since the billing issue with the ICD-10 codes have been resolved and pt's co-pay for capecitabine is $787. I called and spoke with pt's , Heather Ball, to discuss the co-pay and also discussed the [facility] free drug program and Mr. Chandrika Aiken requested that I e-mail the application to him for patient to sign. He mentioned that he provided their social security proof of income to [employer] if I needed to submit that with application. I also discussed with Mr. Chandrika Aiken the option that they have to go ahead and pay the co-pay of $787 with [facility] to get the first fill so she can go ahead and start the medication while we are waiting for approval on the free drug application and he stated he will discuss this option with patient and get back with me. Mr. Heather Ball stated that Manuel Courser is not doing well, is not eating or drinking, and only goes from the bed to the couch. 8/11/21- Received signed paperwork back from both patient and provider and I faxed into [employer] for processing. I will request that MD send Xeloda prescriptions electronically to [facility] in [facility], [address] [place] [address] due to not enough room for both dose prescriptions on PAP form. 8/12/21- Called [employer] to ensure they received this pt's application and per rep, this patient has been approved for free Xeloda for 3 years. They have sent the prescription to [facility] for processing and stated a pharmacist will be reaching out to patient for her first fill of medication but patient will have to call in to [facility] for all other fills after the first one ([phone]) which they recommend doing 10-14 days before next fill is needed to ensure they have plenty of time to ship the medication out so it arrives by the time patient needs it.  I informed the pharmacist that patient needs to start this medication on 8/17 and the rep stated he will place an expedited request on the rx with [facility]. Called and informed patient of the above approval and shipping information and provided [facility]'s phone number to her. Patient very thankful for the assistance and will call MD's office with any future questions/concerns.

## 2021-08-10 NOTE — TELEPHONE ENCOUNTER
Called and spoke with the patient. HIPAA verified by two patient identifiers. She reports fatigue and poor appetite. She spoke with our dietician, Yazan Roy, and knows what to eat but hasn't been eating much. She will try to increase her caloric intake and drink protein shakes. Briefly discussed palliative medicine and their role in helping manage symptoms. She is scheduled to start chemotherapy on 8/17/21. Patient appears depressed. Instructed to call with worsening symptoms or concerns.

## 2021-08-11 NOTE — TELEPHONE ENCOUNTER
[facility] Palliative Medicine Office  Nursing Note  [phone]  Fax [phone]      Name:  Cherry Hein  YOB: 1945    Received outpatient Palliative Medicine referral from Dr. Nesha Harris to see pt for symptom management and supportive care. Chart  reviewed. Cherry Hein is a 68 y.o. female with Metastatic Gastric Adenocarcinoma with metastasis to Liver and Renal Cell Carcinoma, Clear cell type  8/2020 s/p Nephrectomy. Pt's most recent office visit with Carson Nascimento was on 8/2/21. See his note for complete HPI, treatment history, and plan. Nurse called pt and introduced outpatient Palliative Medicine services. Patient declines Palliative Medicine at this time. She says she will discuss with Dr. Carson Nascimento and call our office back if she decides to schedule an appt.     Dudley Antonio, ANGÉLICAN, RN-BC, [facility]

## 2021-08-12 NOTE — TELEPHONE ENCOUNTER
[facility] Palliative Medicine Office  Nursing Note  [phone]  Fax [phone]     Name:  Emir Dunham  YOB: 1945     Received a second request from Dr. Sánchez Lo office to schedule Palliative appt for Stephanie Mendoza.   This nurse called pt and she states she received a call from Palliative office this morning and she scheduled an appt to see Dr. Ilana Erickson on 9/7/21 at 11:30am.    ANGÉLICA JonesN, RN  Palliative Medicine  [phone]

## 2021-08-14 PROBLEM — J18.9 HCAP (HEALTHCARE-ASSOCIATED PNEUMONIA): Status: ACTIVE | Noted: 2021-01-01

## 2021-08-14 PROBLEM — I31.39 PERICARDIAL EFFUSION: Status: ACTIVE | Noted: 2021-01-01

## 2021-08-14 NOTE — ED NOTES
Bedside and Verbal shift change report given to Keily Houston, RN (oncoming nurse) by this RN (offgoing nurse). Report included the following information SBAR.

## 2021-08-14 NOTE — PROGRESS NOTES
Pharmacy Antimicrobial Kinetic Dosing    Indication for Antimicrobials: HAP    Current Regimen of Each Antimicrobial:  Cefepime 2g IV Q24H (Start Date ; Day # 1)  Vancomycin - Pharmacy to Dose (Start Date ; Day # 1)  Previous Antimicrobial Therapy:  Zosyn x 1    Goal Level: AUC: 400-600 mg/hr/Liter/day    Date Dose & Interval Measured (mcg/mL) Extrapolated (mcg/mL)                       Date & time of next level:     Significant Positive Cultures:    Blood - Pending    Conditions for Dosing Consideration: None    Labs:  Recent Labs     21  1211   CREA 1.56*   BUN 33*     Recent Labs     21  1211   WBC 12.6*     Temp (24hrs), Av.3 °F (36.8 °C), Min:98.3 °F (36.8 °C), Max:98.3 °F (36.8 °C)        Creatinine Clearance (mL/min):   CrCl (Ideal Body Weight): 27.6   If actual weight < IBW: CrCl (Actual Body Weight) 32.2    Impression/Plan:   Cefepime adjusted for renal function <30 ml/min to Q24H interval  Vancomycin dosed to provide predicted AUC of 457  Antimicrobial stop date TBD     Pharmacy will follow daily and adjust medications as appropriate for renal function and/or serum levels.     Thank you,  Carmencita Desouza, PHARMD    Vancomycin Dosing Document    Documents located on pharmacy Teams site: Clinical Practice -> Antimicrobial Stewardship -> Antibiotics_Vancomycin     Aminoglycoside Dosing Document    Documents located on pharmacy Teams site: Clinical Practice -> Antimicrobial Stewardship -> Antibiotics_Aminoglycosides

## 2021-08-14 NOTE — ACP (ADVANCE CARE PLANNING)
Advance Care Planning Note      NAME: Camille Jain   :  1945   MRN:  [id number]     Date/Time:  2021 6:15 PM    Active Diagnoses:  Hospital Problems  Date Reviewed: 12/15/2020        Codes Class Noted POA    Pericardial effusion ICD-10-CM: I31.3  ICD-9-CM: 423.9  2021 Unknown        HCAP (healthcare-associated pneumonia) ICD-10-CM: J18.9  ICD-9-CM: 563  2021 Unknown          Gastric cancer with metastasis to liver  Congestive heart failure  Hypertension  Diabetes mellitus  Renal cell cancer s/p nephrectomy      These active diagnoses are of sufficient risk that focused discussion on advance care planning is indicated in order to allow the patient to thoughtfully consider personal goals of care, and if situations arise that prevent the ability to personally give input, to ensure appropriate representation of their personal desires for different levels and aggressiveness of care. Discussion:   Code status addressed and wants to be a Full code. Patient wants central line and vasopressors if needed. Patient would like to assign  Marlo Del Valle  as the surrogate decision maker. Persons present and participating in discussion: Maritza Gabriel MD .       Time Spent:   Total time spent face-to-face in education and discussion:  16   minutes.          Maritza Anthony MD   Hospitalist

## 2021-08-14 NOTE — ED PROVIDER NOTES
EMERGENCY DEPARTMENT HISTORY AND PHYSICAL EXAM      Date: 8/14/2021  Patient Name: Brandy Chin    History of Presenting Illness     Chief Complaint   Patient presents with    Shortness of Breath     pt was recently dx with stomach cancer w/mets to liver. She is having sob since last night. She says that she has had this before and has had to have fluid drained. History Provided By: Patient and      HPI: Brandy Chin, 68 y.o. 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 Gae Goltz, recent hospitalization for large pleural effusion requiring thoracentesis (reportedly drained for 800 cc of fluids), presents to the ED with cc of shortness of breath, worsened by exertion. States that symptoms have been progressive over the last few days. She is concerned that she is having reaccumulation of pleural fluid. States that she has no chest pain while sitting upright, but reports sharp intermittent chest pain with laying flat and deep breathing. She denies previous history of DVT or PEs. No lower extremity edema. States that she has been taking Lasix daily, which seems to be helping with her peripheral edema. No cough, URI symptoms, fevers or chills. Patient is fully vaccinated for covid 19. PCP: Brandee Calhoun, DO    No current facility-administered medications on file prior to encounter. Current Outpatient Medications on File Prior to Encounter   Medication Sig Dispense Refill    oxyCODONE IR (ROXICODONE) 5 mg immediate release tablet Take 1 Tablet by mouth every six (6) hours as needed for Pain for up to 14 days.  Max Daily Amount: 20 mg. 30 Tablet 0    capecitabine (XELODA) 150 mg tablet 2 Tablets two (2) times daily (after meals) with 1 other capecitabine prescription for 1,800 mg total. Take 1800mg by mouth twice a day for 14 days on and 7 days off 56 Tablet 11    capecitabine (XELODA) 500 mg tablet 3 Tablets two (2) times daily (after meals) with 1 other capecitabine prescription for 1,800 mg total. Take 1800mg by mouth twice a day for 14 days on and 7 days off 84 Tablet 11    lidocaine-prilocaine (EMLA) topical cream Apply  to affected area as needed for Pain. 30 g 0    prochlorperazine (COMPAZINE) 10 mg tablet Take 0.5 Tablets by mouth every six (6) hours as needed for Nausea. 60 Tablet 3    ondansetron (ZOFRAN ODT) 4 mg disintegrating tablet Take 1 Tablet by mouth every eight (8) hours as needed for Nausea or Vomiting. 40 Tablet 1    furosemide (LASIX) 40 mg tablet Take 1 Tablet by mouth daily. 90 Tablet 3    azelastine (ASTELIN) 137 mcg (0.1 %) nasal spray PLACE 1 SPRAY IN BOTH NOSTRILS TWICE A DAY AS NEEDED FOR RHINITIS 1 Bottle 1    simvastatin (ZOCOR) 20 mg tablet TAKE 1 TABLET BY MOUTH EVERY NIGHT 90 Tablet 3    metFORMIN (GLUCOPHAGE) 500 mg tablet TAKE 1 TABLET BY MOUTH TWICE A DAY WITH FOOD 180 Tablet 3    glipiZIDE (GLUCOTROL) 5 mg tablet TAKE 1 TABLET BY MOUTH EVERY DAY 90 Tab 3    glucose blood VI test strips (blood glucose test) strip Pt tests daily. DX: E11.65, Please provide with one touch ultra test strips 100 Strip 11    amitriptyline (ELAVIL) 10 mg tablet Take 1 Tab by mouth nightly. Indications: sleep/anxiety 30 Tab 5    ramipriL (ALTACE) 10 mg capsule TAKE 2 CAPSULES BY MOUTH EVERY  Cap 3    Blood-Glucose Meter misc Pt tests daily. Dx: E11.65 1 Each 0    glucose blood VI test strips (ACCU-CHEK MARY GRACE) strip Diagnosis E11.65. Pt is testing once daily.  100 Strip 1       Past History     Past Medical History:  Past Medical History:   Diagnosis Date    Chronic kidney disease     Diabetes ([facility] [address]     Type II    History of kidney cancer 06/2020    stage 2, s/p nephrectomy, no further tx necessary    Hypercholesterolemia     Hypertension     Pleural effusion, left 6/23/2021    Psychiatric disorder     anxiety       Past Surgical History:  Past Surgical History:   Procedure Laterality Date    COLONOSCOPY N/A 12/10/2019    COLONOSCOPY performed by Carlos Eduardo Martinez MD at [facility]COPY    HX CATARACT REMOVAL Bilateral 2015    HX CATARACT REMOVAL  01/09/2018    secondary cataracts removed    HX CHOLECYSTECTOMY  1993    HX DILATION AND CURETTAGE  2000    HX HYSTERECTOMY  2000    complete    HX NEPHRECTOMY Right 2020    stage 2 cancer, no further tx needed    IR INSERT TUNL CVC W PORT OVER 5 YEARS  7/26/2021       Family History:  Family History   Adopted: Yes   Problem Relation Age of Onset    Cancer Daughter         lung    Cancer Son         colon       Social History:  Social History     Tobacco Use    Smoking status: Never Smoker    Smokeless tobacco: Never Used   Substance Use Topics    Alcohol use: No     Alcohol/week: 0.0 standard drinks    Drug use: No       Allergies:  No Known Allergies      Review of Systems   Review of Systems   Constitutional: Negative for chills and fever. HENT: Negative for congestion and rhinorrhea. Eyes: Negative for visual disturbance. Respiratory: Positive for shortness of breath. Negative for chest tightness and wheezing. Cardiovascular: Negative for chest pain, palpitations and leg swelling. Intermittent pleuritic chest pain with laying flat   Gastrointestinal: Negative for abdominal pain, diarrhea, nausea and vomiting. Genitourinary: Negative for dysuria, flank pain and hematuria. Musculoskeletal: Negative for back pain and neck pain. Skin: Negative for rash. Allergic/Immunologic: Negative for immunocompromised state. Neurological: Negative for dizziness, speech difficulty, weakness and headaches. Hematological: Negative for adenopathy. Psychiatric/Behavioral: Negative for dysphoric mood and suicidal ideas. Physical Exam   Physical Exam  Vitals and nursing note reviewed. Constitutional:       General: She is not in acute distress.      Appearance: She is not ill-appearing or toxic-appearing. HENT:      Head: Normocephalic and atraumatic. Nose: Nose normal.      Mouth/Throat:      Mouth: Mucous membranes are moist.   Eyes:      Extraocular Movements: Extraocular movements intact. Pupils: Pupils are equal, round, and reactive to light. Cardiovascular:      Rate and Rhythm: Regular rhythm. Tachycardia present. Pulses: Normal pulses. Pulmonary:      Effort: Pulmonary effort is normal.      Breath sounds: No stridor. No decreased breath sounds, wheezing or rhonchi. Abdominal:      General: Abdomen is flat. There is no distension. Tenderness: There is no abdominal tenderness. Musculoskeletal:         General: Normal range of motion. Cervical back: Normal range of motion and neck supple. Skin:     General: Skin is warm and dry. Neurological:      General: No focal deficit present. Mental Status: She is alert and oriented to person, place, and time.    Psychiatric:         Judgment: Judgment normal.         Diagnostic Study Results     Labs -     Recent Results (from the past 24 hour(s))   EKG, 12 LEAD, INITIAL    Collection Time: 08/14/21 11:09 AM   Result Value Ref Range    Ventricular Rate 116 BPM    Atrial Rate 116 BPM    P-R Interval 130 ms    QRS Duration 64 ms    Q-T Interval 288 ms    QTC Calculation (Bezet) 400 ms    Calculated P Axis 34 degrees    Calculated R Axis 10 degrees    Calculated T Axis 16 degrees    Diagnosis       Sinus tachycardia with premature supraventricular complexes  Low voltage QRS  Nonspecific T wave abnormality  When compared with ECG of 22-JUN-2021 09:35,  premature supraventricular complexes are now present  Criteria for Septal infarct are no longer present  Nonspecific T wave abnormality now evident in Lateral leads     CBC WITH AUTOMATED DIFF    Collection Time: 08/14/21 12:11 PM   Result Value Ref Range    WBC 12.6 (H) 3.6 - 11.0 K/uL    RBC 4.35 3.80 - 5.20 M/uL    HGB 11.5 11.5 - 16.0 g/dL    HCT 37.5 35.0 - 47.0 %    MCV 86.2 80.0 - 99.0 FL    MCH 26.4 26.0 - 34.0 PG    MCHC 30.7 30.0 - 36.5 g/dL    RDW 18.6 (H) 11.5 - 14.5 %    PLATELET 615 (H) 958 - 400 K/uL    MPV 9.4 8.9 - 12.9 FL    NRBC 0.0 0  WBC    ABSOLUTE NRBC 0.00 0.00 - 0.01 K/uL    NEUTROPHILS 84 (H) 32 - 75 %    LYMPHOCYTES 8 (L) 12 - 49 %    MONOCYTES 7 5 - 13 %    EOSINOPHILS 0 0 - 7 %    BASOPHILS 0 0 - 1 %    IMMATURE GRANULOCYTES 1 (H) 0.0 - 0.5 %    ABS. NEUTROPHILS 10.5 (H) 1.8 - 8.0 K/UL    ABS. LYMPHOCYTES 1.0 0.8 - 3.5 K/UL    ABS. MONOCYTES 0.9 0.0 - 1.0 K/UL    ABS. EOSINOPHILS 0.1 0.0 - 0.4 K/UL    ABS. BASOPHILS 0.1 0.0 - 0.1 K/UL    ABS. IMM. GRANS. 0.1 (H) 0.00 - 0.04 K/UL    DF AUTOMATED     METABOLIC PANEL, COMPREHENSIVE    Collection Time: 08/14/21 12:11 PM   Result Value Ref Range    Sodium 129 (L) 136 - 145 mmol/L    Potassium 5.3 (H) 3.5 - 5.1 mmol/L    Chloride 95 (L) 97 - 108 mmol/L    CO2 27 21 - 32 mmol/L    Anion gap 7 5 - 15 mmol/L    Glucose 159 (H) 65 - 100 mg/dL    BUN 33 (H) 6 - 20 MG/DL    Creatinine 1.56 (H) 0.55 - 1.02 MG/DL    BUN/Creatinine ratio 21 (H) 12 - 20      GFR est AA 39 (L) >60 ml/min/1.73m2    GFR est non-AA 32 (L) >60 ml/min/1.73m2    Calcium 8.4 (L) 8.5 - 10.1 MG/DL    Bilirubin, total 0.7 0.2 - 1.0 MG/DL    ALT (SGPT) 22 12 - 78 U/L    AST (SGOT) 32 15 - 37 U/L    Alk.  phosphatase 292 (H) 45 - 117 U/L    Protein, total 6.8 6.4 - 8.2 g/dL    Albumin 2.1 (L) 3.5 - 5.0 g/dL    Globulin 4.7 (H) 2.0 - 4.0 g/dL    A-G Ratio 0.4 (L) 1.1 - 2.2     CK W/ REFLX CKMB    Collection Time: 08/14/21 12:11 PM   Result Value Ref Range     (H) 26 - 192 U/L   TROPONIN I    Collection Time: 08/14/21 12:11 PM   Result Value Ref Range    Troponin-I, Qt. <0.05 <0.05 ng/mL   NT-PRO BNP    Collection Time: 08/14/21 12:11 PM   Result Value Ref Range    NT pro-BNP 1,859 (H) <450 PG/ML   SAMPLES BEING HELD    Collection Time: 08/14/21 12:11 PM   Result Value Ref Range    SAMPLES BEING HELD BLUE     COMMENT        Add-on orders for these samples will be processed based on acceptable specimen integrity and analyte stability, which may vary by analyte. CK-MB,QUANT. Collection Time: 08/14/21 12:11 PM   Result Value Ref Range    CK - MB 2.4 <3.6 NG/ML    CK-MB Index 1.1 0.0 - 2.5         Radiologic Studies -   CT CHEST WO CONT   Final Result   1. Moderate pericardial effusion. 2. Bilateral patchy areas of pulmonary infiltrates concerning for pneumonia in   appropriate clinical setting. 3. Small left greater than right pleural effusions. 4. Progressive hepatic metastases. XR CHEST PA LAT   Final Result   Bilateral pleural effusions with overlying atelectasis that could   obscure other infiltrate. CT Results  (Last 48 hours)               08/14/21 1246  CT CHEST WO CONT Final result    Impression:  1. Moderate pericardial effusion. 2. Bilateral patchy areas of pulmonary infiltrates concerning for pneumonia in   appropriate clinical setting. 3. Small left greater than right pleural effusions. 4. Progressive hepatic metastases. Narrative:  INDICATION: History of lung cancer and pleural effusion requiring drainage. Evaluate for infiltrates or pericardial effusion. COMPARISON: CT ABD 6/23/2021. CT thorax 6/22/2021. CT thorax 6/10/2020. CONTRAST: None. TECHNIQUE:  5 mm axial images were obtained through the thorax. Coronal and   sagittal reformats were generated. CT dose reduction was achieved through use   of a standardized protocol tailored for this examination and automatic exposure   control for dose modulation. The absence of intravenous contrast reduces the sensitivity for evaluation of   the mediastinum, sujatha, vasculature, and upper abdominal organs. FINDINGS:       CHEST WALL: No mass or axillary lymphadenopathy. Right Port-A-Cath in place with   catheter traversing expected course to the region of the atriocaval junction.    THYROID: Heterogeneous thyroid enlargement redemonstrated with probable dominant   nodule in the left gland measuring up to 2.9 x 3.7 cm, not significant change   since 2020. MEDIASTINUM: No mass or lymphadenopathy. HARRY: No mass or lymphadenopathy. THORACIC AORTA: Atherosclerotic calcification without aneurysm. MAIN PULMONARY ARTERY: Normal in caliber. TRACHEA/BRONCHI: Patent. ESOPHAGUS: No wall thickening or dilatation. HEART: Moderate pleural effusion. Heart is normal in size. PLEURA: Left greater than right small pleural effusions. No pneumothorax. LUNGS: Patchy airspace infiltrates in the posterior left upper lobe and lingula. Atelectasis left lower lobe overlying effusion. No mass or nodule. INCIDENTALLY IMAGED UPPER ABDOMEN: Numerous hepatic metastases, increased in   number and size from prior abdominal CT with largest lesions measuring 1.9 x 2.2   cm and 1.9 x 2.0 cm in the right lobe and 1.5 x 1.6 cm and left lobe. BONES: Degenerative spine change. No acute fracture or aggressive lesion. CXR Results  (Last 48 hours)               08/14/21 1202  XR CHEST PA LAT Final result    Impression:  Bilateral pleural effusions with overlying atelectasis that could   obscure other infiltrate. Narrative:  EXAM: XR CHEST PA LAT       INDICATION: Shortness of Breath       COMPARISON: Chest x-ray 6/22/2021. FINDINGS: A portable AP radiograph of the chest was obtained at 11:54 hours. A   right Port-A-Cath is in position with catheter traversing expected course of   terminate in the region of the distal SVC. There is no pneumothorax. There are   small bilateral pleural effusions with overlying airspace opacity. The lungs   otherwise are clear. . The cardiac and mediastinal contours and pulmonary   vascularity are normal.  Atherosclerotic calcifications affect the aortic arch.    The chest wall structures and visualized upper abdomen show no acute findings   with incidental note of degenerative spine and shoulder changes. Cholestatic   clips noted. Medical Decision Making   I am the first provider for this patient. I reviewed the vital signs, available nursing notes, past medical history, past surgical history, family history and social history. Vital Signs-Reviewed the patient's vital signs. Patient Vitals for the past 24 hrs:   Temp Pulse Resp BP SpO2   08/14/21 1049 98.3 °F (36.8 °C) (!) 119 20 120/60 97 %       EKG interpretation: (Preliminary)  Sinus tachycardia, 116 bpm, nonspecific ST changes. QTc normal.  EKG interpretation by Julianne Mccarthy MD    Records Reviewed: Nursing Notes, Old Medical Records, Previous electrocardiograms, Previous Radiology Studies and Previous Laboratory Studies    Provider Notes (Medical Decision Making):   Patient is hemodynamically stable, tachycardic, afebrile and nontoxic in appearance. She is breathing comfortably on room air, satting in the high 90s, and is able to speak in full sentences without signs of respiratory distress. she has no lower extremity edema or calf tenderness. Differential diagnosis considered: Recurrent pleural effusion, pneumonia, pericardial effusion, PE, ACS, pulmonary edema. Work-up in the ED is remarkable for leukocytosis of 12.6 without lactic acidosis. She has hyponatremia with sodium of 129, mild hyperkalemia of 5.3. Renal function is largely at baseline. Troponin is negative. CK with reflex CK-MB is elevated at 228. proBNP elevated at 1859 (doubt CHF as patient does not appear hypervolemic). CT chest without contrast revealed moderate pericardial effusion. This finding was discussed with the reading radiologist, who reported that effusion appeared larger in size than that seen in CT from June of this year. In addition, patient also has findings of bilateral patchy areas of pulmonary infiltrates concerning for pneumonia. Small left greater than right pleural effusions.   Progressive hepatic metastasis also seen on CT. Results of today's work-up discussed with the patient as well as her  at bedside. Blood cultures were sent. Patient is empirically treated for HCAP due to recent hospitalization with IV vancomycin and IV Zosyn. In terms of her enlarging pericardial effusion, I do not suspect tamponade physiology as patient is currently hemodynamically stable. Her tachycardia was present during her last hospital admission as well, and is currently largely unchanged. I will hold her Lasix dose at this time as pericardial effusion is preload dependent, and will defer to inpatient specialists/hospitalist for appropriateness of this medication. Patient's symptoms of progressive shortness of breath as well as intermittent pleuritic chest pain suspected secondary to pneumonia and possibly enlarging pericardial effusion. In light of the 2 findings above, I feel that she would be best served with inpatient admission for further management. Her case was discussed with the hospitalist, who has accepted her for admission in stable condition at this time. Kary Aviles MD      Disposition:  Admit      Diagnosis     Clinical Impression:   1. HCAP (healthcare-associated pneumonia)    2. Pericardial effusion without cardiac tamponade    3. Pleural effusion    4. Gastric adenocarcinoma ([address]    5. Liver metastases ([address]        Attestations:    Kary Aviles MD    Please note that this dictation was completed with [email or web address], the computer voice recognition software. Quite often unanticipated grammatical, syntax, homophones, and other interpretive errors are inadvertently transcribed by the computer software. Please disregard these errors. Please excuse any errors that have escaped final proofreading. Thank you.

## 2021-08-14 NOTE — H&P
Hospitalist Admission Note    NAME: Saolme Millan   :  1945   MRN:  [id number]     Date/Time:  2021 4:36 PM    Patient PCP: Thalia Rebolledo MD  ________________________________________________________________________    My assessment of this patient's clinical condition and my plan of care is as follows. Assessment / Plan:  Healthcare associated pneumonia  Sepsis due to above  -CT shows evidence of bilateral patchy infiltrates. Check SARS-CoV-2 rapid test.  Start empiric antibiotics with vancomycin and cefepime. She is on room air.  -Lactic acid is within normal limits    Pericardial effusion r/o mets from gastric adeno ca  -CT shows evidence of moderate pericardial effusion that is slightly bigger when compared to previous imaging. She is currently not in tamponade and hemodynamically stable. Will consult cardiology.  -Evaluated by cardiology on 2021 and felt that there was no need for pericardiocentesis at that time since no malignant cells were detected in pleural fluid. Mild hyperkalemia  Mild hyponatremia  Chronic diastolic congestive heart failure  -Continue home Lasix. Will give an extra dose of IV Lasix given mild volume overload  -Hold home ACE inhibitor which is contributing to hyperkalemia  -Strict I's and O's, daily weights and low-salt diet    Diabetes mellitus type 2  Dyslipidemia  History of renal cell carcinoma s/p nephrectomy  Metastatic gastric adenocarcinoma with mets to liver  -hold home glipizide. Start insulin sliding scale blood sugar checks  -Cont lipitor  -consult oncology    CKD stage III  -Creatinine is close to baseline of around 1.3-1.5. Repeat BMP in a.m. Incidental thyroid nodule  -not significantly changed per report from . Arrange out pt f/u with PCP.     Anxiety        Code Status: full   Surrogate Decision Maker:     DVT Prophylaxis: Lovenox  GI Prophylaxis: not indicated    Baseline: From home independent Subjective:   CHIEF COMPLAINT: Chest pain    HISTORY OF PRESENT ILLNESS:     Johan Simons is a 68 y.o.   female who presents with past medical history of chronic pericardial effusion, liver metastasis, congestive heart failure is coming the hospital chief complaints of chest pain. Patient reports pain is mostly in the substernal region, sharp, exacerbated by lying down, without any relieving factors. Does not report any associated shortness of breath, cough or phlegm. Does not report any associated fever or chills. Denies abdominal pain, nausea or vomiting. Denies focal weakness, tingling or numbness. On arrival to ED, she was tachycardic, blood pressure was stable. On labs white blood cell count is 12.6. BMP shows a sodium of 129, potassium 5.3, creatinine 1.56 LFTs are normal.  Troponin is 0.05.  proBNP is 1859. CT chest shows evidence of moderate pericardial effusion with pneumonia and progressive hepatic metastasis and also thyroid nodules. We were asked to admit for work up and evaluation of the above problems.      Past Medical History:   Diagnosis Date    Chronic kidney disease     Diabetes ([facility] [address]     Type II    History of kidney cancer 06/2020    stage 2, s/p nephrectomy, no further tx necessary    Hypercholesterolemia     Hypertension     Pleural effusion, left 6/23/2021    Psychiatric disorder     anxiety        Past Surgical History:   Procedure Laterality Date    COLONOSCOPY N/A 12/10/2019    COLONOSCOPY performed by Josseline [facility] at [facility] ENDOSCOPY    HX CATARACT REMOVAL Bilateral 2015    HX CATARACT REMOVAL  01/09/2018    secondary cataracts removed    HX CHOLECYSTECTOMY  1993    HX DILATION AND CURETTAGE  2000    HX HYSTERECTOMY  2000    complete    HX NEPHRECTOMY Right 2020    stage 2 cancer, no further tx needed    IR INSERT TUNL CVC W PORT OVER 5 YEARS  7/26/2021       Social History     Tobacco Use    Smoking status: Never Smoker    Smokeless tobacco: Never Used   Substance Use Topics    Alcohol use: No     Alcohol/week: 0.0 standard drinks        Family History   Adopted: Yes   Problem Relation Age of Onset   [facility] Daughter         lung    Cancer Son         colon     No Known Allergies     Prior to Admission medications    Medication Sig Start Date End Date Taking? Authorizing Provider   oxyCODONE IR (ROXICODONE) 5 mg immediate release tablet Take 1 Tablet by mouth every six (6) hours as needed for Pain for up to 14 days. Max Daily Amount: 20 mg. 8/11/21 8/25/21  Marshal Carrion NP   capecitabine (XELODA) 150 mg tablet 2 Tablets two (2) times daily (after meals) with 1 other capecitabine prescription for 1,800 mg total. Take 1800mg by mouth twice a day for 14 days on and 7 days off 8/11/21 8/25/21  Dolores Mae MD   capecitabine (XELODA) 500 mg tablet 3 Tablets two (2) times daily (after meals) with 1 other capecitabine prescription for 1,800 mg total. Take 1800mg by mouth twice a day for 14 days on and 7 days off 8/11/21 8/25/21  Dolores Mae MD   lidocaine-prilocaine (EMLA) topical cream Apply  to affected area as needed for Pain. 8/2/21   Dolores Mae MD   prochlorperazine (COMPAZINE) 10 mg tablet Take 0.5 Tablets by mouth every six (6) hours as needed for Nausea. 8/2/21   Dolores Mae MD   ondansetron (ZOFRAN ODT) 4 mg disintegrating tablet Take 1 Tablet by mouth every eight (8) hours as needed for Nausea or Vomiting. 7/29/21   Dolores Mae MD   furosemide (LASIX) 40 mg tablet Take 1 Tablet by mouth daily.  7/28/21   Aleeariel Treviño B III, DO   azelastine (ASTELIN) 137 mcg (0.1 %) nasal spray PLACE 1 SPRAY IN BOTH NOSTRILS TWICE A DAY AS NEEDED FOR RHINITIS 7/13/21   Aleeariel Treviño B III, DO   simvastatin (ZOCOR) 20 mg tablet TAKE 1 TABLET BY MOUTH EVERY NIGHT 5/31/21   Alee How B III, DO   metFORMIN (GLUCOPHAGE) 500 mg tablet TAKE 1 TABLET BY MOUTH TWICE A DAY WITH FOOD 5/28/21   Alee KAUR III, DO   glipiZIDE (GLUCOTROL) 5 mg tablet TAKE 1 TABLET BY MOUTH EVERY DAY 5/2/21   Joe Gomez III, DO   glucose blood VI test strips (blood glucose test) strip Pt tests daily. DX: E11.65, Please provide with one touch ultra test strips 1/4/21   Radha Gomez III, DO   amitriptyline (ELAVIL) 10 mg tablet Take 1 Tab by mouth nightly. Indications: sleep/anxiety 12/15/20   Ilda Shone, NP   ramipriL (ALTACE) 10 mg capsule TAKE 2 CAPSULES BY MOUTH EVERY DAY 10/1/20   Joe Gomez III, DO   Blood-Glucose Meter misc Pt tests daily. Dx: E11.65 3/15/18   Juno Multani MD   glucose blood VI test strips (ACCU-CHEK MARY GRACE) strip Diagnosis E11.65. Pt is testing once daily. 3/12/18   Juno Multani MD       REVIEW OF SYSTEMS:     I am not able to complete the review of systems because:    The patient is intubated and sedated    The patient has altered mental status due to his acute medical problems    The patient has baseline aphasia from prior stroke(s)    The patient has baseline dementia and is not reliable historian    The patient is in acute medical distress and unable to provide information           Total of 12 systems reviewed as follows:       POSITIVE= underlined text  Negative = text not underlined  General:  fever, chills, sweats, generalized weakness, weight loss/gain,      loss of appetite   Eyes:    blurred vision, eye pain, loss of vision, double vision  ENT:    rhinorrhea, pharyngitis   Respiratory:   cough, sputum production, SOB, NEWTON, wheezing, pleuritic pain   Cardiology:   chest pain, palpitations, orthopnea, PND, edema, syncope   Gastrointestinal:  abdominal pain , N/V, diarrhea, dysphagia, constipation, bleeding   Genitourinary:  frequency, urgency, dysuria, hematuria, incontinence   Muskuloskeletal :  arthralgia, myalgia, back pain  Hematology:  easy bruising, nose or gum bleeding, lymphadenopathy   Dermatological: rash, ulceration, pruritis, color change / jaundice  Endocrine: hot flashes or polydipsia   Neurological:  headache, dizziness, confusion, focal weakness, paresthesia,     Speech difficulties, memory loss, gait difficulty  Psychological: Feelings of anxiety, depression, agitation    Objective:   VITALS:    Visit Vitals  BP (!) 113/53   Pulse 100   Temp 98.3 °F (36.8 °C)   Resp 15   Ht 5' 5\" (1.651 m)   Wt 66.5 kg (146 lb 9.7 oz)   SpO2 96%   BMI 24.40 kg/m²       PHYSICAL EXAM:    General:    Alert, cooperative, no distress, appears stated age. HEENT: Atraumatic, anicteric sclerae, pink conjunctivae     No oral ulcers, mucosa moist, throat clear, dentition fair  Neck:  Supple, symmetrical,  thyroid: non tender  Lungs:   Bilateral air entry present, crackles present, no wheezing  Chest wall:  No tenderness  No Accessory muscle use. Heart:   Regular  rhythm,  No  murmur   No edema  Abdomen:   Soft, non-tender. Not distended. Bowel sounds normal  Extremities: No cyanosis. No clubbing,      Skin turgor normal, Capillary refill normal, Radial dial pulse 2+  Skin:     Not pale. Not Jaundiced  No rashes   Psych:  Not anxious or agitated. Neurologic: EOMs intact. No facial asymmetry. No aphasia or slurred speech. Symmetrical strength, Sensation grossly intact.  Alert and oriented X 4.     _______________________________________________________________________  Care Plan discussed with:    Comments   Patient y    Family      RN y    Care Manager                    Consultant:      _______________________________________________________________________  Expected  Disposition:   Home with Family y   HH/PT/OT/RN    SNF/LTC    CHANTELLE    ________________________________________________________________________  TOTAL TIME:  61  Minutes    Critical Care Provided     Minutes non procedure based      Comments    y Reviewed previous records   >50% of visit spent in counseling and coordination of care y Discussion with patient and/or family and questions answered ________________________________________________________________________  Signed: Mirna Sexton MD    Procedures: see electronic medical records for all procedures/Xrays and details which were not copied into this note but were reviewed prior to creation of Plan. LAB DATA REVIEWED:    Recent Results (from the past 24 hour(s))   EKG, 12 LEAD, INITIAL    Collection Time: 08/14/21 11:09 AM   Result Value Ref Range    Ventricular Rate 116 BPM    Atrial Rate 116 BPM    P-R Interval 130 ms    QRS Duration 64 ms    Q-T Interval 288 ms    QTC Calculation (Bezet) 400 ms    Calculated P Axis 34 degrees    Calculated R Axis 10 degrees    Calculated T Axis 16 degrees    Diagnosis       Sinus tachycardia with premature supraventricular complexes  Low voltage QRS  Nonspecific T wave abnormality  When compared with ECG of 22-JUN-2021 09:35,  premature supraventricular complexes are now present  Criteria for Septal infarct are no longer present  Nonspecific T wave abnormality now evident in Lateral leads     CBC WITH AUTOMATED DIFF    Collection Time: 08/14/21 12:11 PM   Result Value Ref Range    WBC 12.6 (H) 3.6 - 11.0 K/uL    RBC 4.35 3.80 - 5.20 M/uL    HGB 11.5 11.5 - 16.0 g/dL    HCT 37.5 35.0 - 47.0 %    MCV 86.2 80.0 - 99.0 FL    MCH 26.4 26.0 - 34.0 PG    MCHC 30.7 30.0 - 36.5 g/dL    RDW 18.6 (H) 11.5 - 14.5 %    PLATELET 515 (H) 434 - 400 K/uL    MPV 9.4 8.9 - 12.9 FL    NRBC 0.0 0  WBC    ABSOLUTE NRBC 0.00 0.00 - 0.01 K/uL    NEUTROPHILS 84 (H) 32 - 75 %    LYMPHOCYTES 8 (L) 12 - 49 %    MONOCYTES 7 5 - 13 %    EOSINOPHILS 0 0 - 7 %    BASOPHILS 0 0 - 1 %    IMMATURE GRANULOCYTES 1 (H) 0.0 - 0.5 %    ABS. NEUTROPHILS 10.5 (H) 1.8 - 8.0 K/UL    ABS. LYMPHOCYTES 1.0 0.8 - 3.5 K/UL    ABS. MONOCYTES 0.9 0.0 - 1.0 K/UL    ABS. EOSINOPHILS 0.1 0.0 - 0.4 K/UL    ABS. BASOPHILS 0.1 0.0 - 0.1 K/UL    ABS. IMM.  GRANS. 0.1 (H) 0.00 - 0.04 K/UL    DF AUTOMATED     METABOLIC PANEL, COMPREHENSIVE    Collection Time: 08/14/21 12:11 PM   Result Value Ref Range    Sodium 129 (L) 136 - 145 mmol/L    Potassium 5.3 (H) 3.5 - 5.1 mmol/L    Chloride 95 (L) 97 - 108 mmol/L    CO2 27 21 - 32 mmol/L    Anion gap 7 5 - 15 mmol/L    Glucose 159 (H) 65 - 100 mg/dL    BUN 33 (H) 6 - 20 MG/DL    Creatinine 1.56 (H) 0.55 - 1.02 MG/DL    BUN/Creatinine ratio 21 (H) 12 - 20      GFR est AA 39 (L) >60 ml/min/1.73m2    GFR est non-AA 32 (L) >60 ml/min/1.73m2    Calcium 8.4 (L) 8.5 - 10.1 MG/DL    Bilirubin, total 0.7 0.2 - 1.0 MG/DL    ALT (SGPT) 22 12 - 78 U/L    AST (SGOT) 32 15 - 37 U/L    Alk. phosphatase 292 (H) 45 - 117 U/L    Protein, total 6.8 6.4 - 8.2 g/dL    Albumin 2.1 (L) 3.5 - 5.0 g/dL    Globulin 4.7 (H) 2.0 - 4.0 g/dL    A-G Ratio 0.4 (L) 1.1 - 2.2     CK W/ REFLX CKMB    Collection Time: 08/14/21 12:11 PM   Result Value Ref Range     (H) 26 - 192 U/L   TROPONIN I    Collection Time: 08/14/21 12:11 PM   Result Value Ref Range    Troponin-I, Qt. <0.05 <0.05 ng/mL   NT-PRO BNP    Collection Time: 08/14/21 12:11 PM   Result Value Ref Range    NT pro-BNP 1,859 (H) <450 PG/ML   SAMPLES BEING HELD    Collection Time: 08/14/21 12:11 PM   Result Value Ref Range    SAMPLES BEING HELD BLUE     COMMENT        Add-on orders for these samples will be processed based on acceptable specimen integrity and analyte stability, which may vary by analyte. CK-MB,QUANT.     Collection Time: 08/14/21 12:11 PM   Result Value Ref Range    CK - MB 2.4 <3.6 NG/ML    CK-MB Index 1.1 0.0 - 2.5     BLOOD GAS,CHEM8,LACTIC ACID POC    Collection Time: 08/14/21  3:39 PM   Result Value Ref Range    Calcium, ionized (POC) 1.12 1.12 - 1.32 mmol/L    BICARBONATE 23 mmol/L    Base deficit (POC) 0.6 mmol/L    Sample source VENOUS BLOOD      CO2, POC 23 19 - 24 MMOL/L    Sodium,  (L) 136 - 145 MMOL/L    Potassium, POC 5.1 3.5 - 5.5 MMOL/L    Chloride, POC 92 (L) 100 - 108 MMOL/L    Glucose,  (H) 74 - 106 MG/DL    Creatinine, POC 1.8 (H) 0.6 - 1.3 MG/DL Lactic Acid (POC) 1.04 0.40 - 2.00 mmol/L    pH, venous (POC) 7.45 (H) 7.32 - 7.42      pCO2, venous (POC) 33.0 (L) 41 - 51 MMHG    pO2, venous (POC) 30 25 - 40 mmHg

## 2021-08-14 NOTE — ED NOTES
Bedside shift change report given to Reyes Kim, RN (oncoming nurse) by Aniyah Ledbetter RN (offgoing nurse). Report included the following information SBAR, Kardex, ED Summary, [facility] and Recent Results. 12:02 AM: TRANSFER - OUT REPORT:    Verbal report given to Linde Bamberger, RN(name) on Ny Lawson  being transferred to [facility](unit) for routine progression of care       Report consisted of patients Situation, Background, Assessment and   Recommendations(SBAR). Information from the following report(s) SBAR, Kardex, ED Summary, [facility] and Recent Results was reviewed with the receiving nurse. Lines:   Peripheral IV 08/14/21 Left Antecubital (Active)   Site Assessment Clean, dry, & intact 08/14/21 1228   Phlebitis Assessment 0 08/14/21 1228   Infiltration Assessment 0 08/14/21 1228   Dressing Status Clean, dry, & intact 08/14/21 1228        Opportunity for questions and clarification was provided.       Patient transported with:   Vyykn

## 2021-08-15 PROBLEM — C79.9 METASTASIS FROM GASTRIC CANCER (HCC): Status: ACTIVE | Noted: 2021-01-01

## 2021-08-15 PROBLEM — Z79.899 HIGH RISK MEDICATION USE: Status: ACTIVE | Noted: 2021-01-01

## 2021-08-15 PROBLEM — D64.9 NORMOCYTIC ANEMIA: Status: ACTIVE | Noted: 2021-01-01

## 2021-08-15 PROBLEM — C16.9 METASTASIS FROM GASTRIC CANCER (HCC): Status: ACTIVE | Noted: 2021-01-01

## 2021-08-15 NOTE — PROGRESS NOTES
08/15/21 0333   Vital Signs   Temp 98 °F (36.7 °C)   Temp Source Oral   Pulse (Heart Rate) (!) 112   Cardiac Rhythm Sinus Rhythm   Resp Rate 21   O2 Sat (%) 96 %   Level of Consciousness Alert (0)   /68   MAP (Monitor) 88   MAP (Calculated) 91   MEWS Score 4   Pain 1   Pain Scale 1 Numeric (0 - 10)   Pain Intensity 1 0   Patient Stated Pain Goal 0   Tachy with ambulation, will monitor.

## 2021-08-15 NOTE — PROGRESS NOTES
End of Shift Note    Bedside shift change report given to [facility] (oncoming nurse) by Staci Trinidad (offgoing nurse). Report included the following information SBAR    Shift worked:  7A-7P     Shift summary and any significant changes:    Uneventful shift     Concerns for physician to address:  none     Zone phone for oncoming shift:          Activity:  Activity Level: Up with Assistance  Number times ambulated in hallways past shift: 0  Number of times OOB to chair past shift: 0    Cardiac:   Cardiac Monitoring: Yes      Cardiac Rhythm: Sinus Rhythm    Access:   Current line(s): PIV     Genitourinary:   Urinary status: voiding and external catheter    Respiratory:   O2 Device: None (Room air)  Chronic home O2 use?: NO  Incentive spirometer at bedside: NO     GI:  Last Bowel Movement Date: 07/12/21  Current diet:  ADULT DIET Regular  Passing flatus: YES  Tolerating current diet: YES       Pain Management:   Patient states pain is manageable on current regimen: N/A    Skin:  Deshawn Score: 20  Interventions: increase time out of bed, limit briefs and internal/external urinary devices    Patient Safety:  Fall Score:  Total Score: 1  Interventions: bed/chair alarm, assistive device (walker, cane, etc), gripper socks, pt to call before getting OOB and stay with me (per policy)       Length of Stay:  Expected LOS: - - -  Actual LOS: [address]

## 2021-08-15 NOTE — PROGRESS NOTES
Hospitalist Progress Note    NAME: Neftali Stahl   :  1945   MRN:  [id number]       Assessment / Plan:  Healthcare associated pneumonia  Sepsis due to above  -CT shows evidence of bilateral patchy infiltrates. Rapid covid negative  C/w  empiric antibiotics with vancomycin and cefepime. She is on room air.  -Lactic acid is within normal limits     Pericardial effusion r/o mets from gastric adeno ca  -CT shows evidence of moderate pericardial effusion that is slightly bigger when compared to previous imaging. She is currently not in tamponade and hemodynamically stable. Cardiology consulted  -Evaluated by cardiology on 2021 and felt that there was no need for pericardiocentesis at that time since no malignant cells were detected in pleural fluid.     Mild hyperkalemia  Mild hyponatremia  Chronic diastolic congestive heart failure  -Continue home Lasix.    -Hold home ACE inhibitor which is contributing to hyperkalemia  -Strict I's and O's, daily weights and low-salt diet     Diabetes mellitus type 2  Dyslipidemia  History of renal cell carcinoma s/p nephrectomy  Metastatic gastric adenocarcinoma with mets to liver  -hold home glipizide. c/w insulin sliding scale blood sugar checks  -Cont lipitor  -consult oncology     CKD stage III  -Creatinine is close to baseline of around 1.3-1.5. Repeat BMP in a.m.     Incidental thyroid nodule  -not significantly changed per report from . Arrange out pt f/u with PCP.     Anxiety     Code Status: full   Surrogate Decision Maker:      DVT Prophylaxis: Lovenox  GI Prophylaxis: not indicated     Baseline: From home independent   Estimated discharge:   Barriers: Clniical improvement. Subjective:     Chief Complaint / Reason for Physician Visit  F/u for Pneumonia  She feels better today, remains on room air.     Review of Systems:  Symptom Y/N Comments  Symptom Y/N Comments   Fever/Chills n   Chest Pain n    Poor Appetite n   Edema n    Cough Abdominal Pain     Sputum    Joint Pain     SOB/NEWTON    Pruritis/Rash     Nausea/vomit    Tolerating PT/OT     Diarrhea    Tolerating Diet     Constipation    Other       Could NOT obtain due to:      Objective:     VITALS:   Last 24hrs VS reviewed since prior progress note. Most recent are:  Patient Vitals for the past 24 hrs:   Temp Pulse Resp BP SpO2   08/15/21 0911 98.4 °F (36.9 °C) (!) 104 22 (!) 120/56 95 %   08/15/21 0333 98 °F (36.7 °C) (!) 112 21 137/68 96 %   08/15/21 0200  93 20 (!) 116/48 94 %   08/15/21 0100  94 19 (!) 109/54 93 %   08/14/21 2257 98.7 °F (37.1 °C) 98 19 (!) 107/54 94 %   08/14/21 2200  97 17 (!) 107/54 94 %   08/14/21 2100  98 17 (!) 107/56 95 %   08/14/21 2000  97 18 (!) 107/55 96 %   08/14/21 1900 99.3 °F (37.4 °C) (!) 101 18 (!) 118/59 96 %   08/14/21 1700  93 15 (!) 105/59 97 %   08/14/21 1600  95 18 (!) 101/52 97 %   08/14/21 1500  98 16 (!) 113/56 96 %   08/14/21 1400  100 15 (!) 113/53 96 %   08/14/21 1300  (!) 102 16 124/60    08/14/21 1224  (!) 105  125/67 97 %   08/14/21 1049 98.3 °F (36.8 °C) (!) 119 20 120/60 97 %       Intake/Output Summary (Last 24 hours) at 8/15/2021 1025  Last data filed at 8/15/2021 0448  Gross per 24 hour   Intake 550 ml   Output    Net 550 ml        I had a face to face encounter and independently examined this patient on 8/15/2021, as outlined below:  PHYSICAL EXAM:  General: WD, WN. Alert, cooperative, no acute distress    EENT:  EOMI. Anicteric sclerae. MMM  Resp:  CTA bilaterally, no wheezing or rales. No accessory muscle use  CV:  Regular  rhythm,  No edema  GI:  Soft, Non distended, Non tender. +Bowel sounds  Neurologic:  Alert and oriented X 3, normal speech,   Psych:   Good insight. Not anxious nor agitated  Skin:  No rashes.   No jaundice    Reviewed most current lab test results and cultures  YES  Reviewed most current radiology test results   YES  Review and summation of old records today    NO  Reviewed patient's current orders and MAR    YES  PMH/SH reviewed - no change compared to H&P  ________________________________________________________________________  Care Plan discussed with:    Comments   Patient y    Family      RN y    Care Manager     Consultant                        Multidiciplinary team rounds were held today with , nursing, pharmacist and clinical coordinator. Patient's plan of care was discussed; medications were reviewed and discharge planning was addressed. ________________________________________________________________________  Total NON critical care TIME:  35   Minutes    Total CRITICAL CARE TIME Spent:   Minutes non procedure based      Comments   >50% of visit spent in counseling and coordination of care     ________________________________________________________________________  Preet Saxena MD     Procedures: see electronic medical records for all procedures/Xrays and details which were not copied into this note but were reviewed prior to creation of Plan. LABS:  I reviewed today's most current labs and imaging studies.   Pertinent labs include:  Recent Labs     08/15/21  0306 08/14/21  1211   WBC 10.9 12.6*   HGB 9.3* 11.5   HCT 30.2* 37.5    468*     Recent Labs     08/15/21  0306 08/14/21  1211   * 129*   K 4.5 5.3*   CL 98 95*   CO2 25 27   * 159*   BUN 33* 33*   CREA 1.48* 1.56*   CA 8.0* 8.4*   ALB  --  2.1*   TBILI  --  0.7   ALT  --  22       Signed: Preet Saxena MD

## 2021-08-15 NOTE — PROGRESS NOTES
Problem: Falls - Risk of  Goal: *Absence of Falls  Description: Document Viry Moffett Fall Risk and appropriate interventions in the flowsheet.   Outcome: Progressing Towards Goal  Note: Fall Risk Interventions:            Medication Interventions: Assess postural VS orthostatic hypotension, Evaluate medications/consider consulting pharmacy, Bed/chair exit alarm, Patient to call before getting OOB, Teach patient to arise slowly

## 2021-08-15 NOTE — PROGRESS NOTES
Consult noted. Will get echo. Would reserve pericardial tap for tamponnade as progressive hepatic mets already present.

## 2021-08-15 NOTE — CONSULTS
Cancer [place] at [facility] 88  [address] [facility], [address] [place] [address] November: [phone]  F: [phone] Patient ID  Name: Nando Vinson  YOB: 1945  MRN: [id number]  Referring Provider:   No referring provider defined for this encounter. Primary Care Provider:   Rabia Dang MD       HEMATOLOGY/MEDICAL ONCOLOGY CONSULTATION NOTE   Date of Visit: 08/15/21  Reason for Visit:     Chief Complaint   Patient presents with    Shortness of Breath     pt was recently dx with stomach cancer w/mets to liver. She is having sob since last night. She says that she has had this before and has had to have fluid drained. Subjective:   Nando Vinson is a 68 y.o. 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 Also, with a history of renal cancer. . Patient reports that she is dealing with shortness of breath. She tells me that she seemed to feel that something was off for the past few days.  corroborates that she has been able to maintain most activities of daily living but more recently has had some difficulties. She denies any headaches or acute vision changes. She denies any severe uncontrolled pain. She notes that she is feeling slightly better today. However, she also has been found to have pericardial effusion. She reportedly is set to start on 8/17/2021 Xeloda, oxaliplatin,pembrolizumab, and Herceptin. She reports adequate oral intake of food and hydration. Patient denies any bowel or bladder problems.          Past Medical History:   Diagnosis Date    Chronic kidney disease     Diabetes ([facility] [address]     Type II    History of kidney cancer 06/2020    stage 2, s/p nephrectomy, no further tx necessary    Hypercholesterolemia     Hypertension     Pleural effusion, left 6/23/2021    Psychiatric disorder     anxiety      Past Surgical History:   Procedure Laterality Date    COLONOSCOPY N/A 12/10/2019    COLONOSCOPY performed by Nissa Mcmillan MD at [facility] ENDOSCOPY    HX CATARACT REMOVAL Bilateral 2015    HX CATARACT REMOVAL  01/09/2018    secondary cataracts removed    HX CHOLECYSTECTOMY  1993    HX DILATION AND CURETTAGE  2000    HX HYSTERECTOMY  2000    complete    HX NEPHRECTOMY Right 2020    stage 2 cancer, no further tx needed    IR INSERT TUNL CVC W PORT OVER 5 YEARS  7/26/2021      Social History     Tobacco Use    Smoking status: Never Smoker    Smokeless tobacco: Never Used   Substance Use Topics    Alcohol use: No     Alcohol/week: 0.0 standard drinks      Family History   Adopted: Yes   Problem Relation Age of Onset    Cancer Daughter         lung    Cancer Son         colon     Current Facility-Administered Medications   Medication Dose Route Frequency    acetaminophen (TYLENOL) tablet 650 mg  650 mg Oral Q6H PRN    ondansetron (ZOFRAN) injection 4 mg  4 mg IntraVENous Q4H PRN    amitriptyline (ELAVIL) tablet 10 mg  10 mg Oral QHS    furosemide (LASIX) tablet 40 mg  40 mg Oral DAILY    atorvastatin (LIPITOR) tablet 20 mg  20 mg Oral DAILY    sodium chloride (NS) flush 5-40 mL  5-40 mL IntraVENous Q8H    sodium chloride (NS) flush 5-40 mL  5-40 mL IntraVENous PRN    polyethylene glycol (MIRALAX) packet 17 g  17 g Oral DAILY PRN    enoxaparin (LOVENOX) injection 30 mg  30 mg SubCUTAneous DAILY    cefepime (MAXIPIME) 2 g in 0.9% sodium chloride (MBP/ADV) 100 mL MBP  2 g IntraVENous Q24H    vancomycin (VANCOCIN) 750 mg in 0.9% sodium chloride 250 mL (VIAL-MATE)  750 mg IntraVENous Q24H    insulin lispro (HUMALOG) injection   SubCUTAneous AC&HS    glucose chewable tablet 16 g  4 Tablet Oral PRN    dextrose (D50W) injection syrg 12.5-25 g  12.5-25 g IntraVENous PRN    glucagon (GLUCAGEN) injection 1 mg  1 mg IntraMUSCular PRN      No Known Allergies   Review of Systems provided by: patient  General: denies fever and reports fatigue  HEENT: denies epistaxis and denies trouble swallowing  Eyes: denies any vision loss and denies any eye pain  Cardio: denies any chest pain and denies any leg swelling  Resp: denies any hemoptysis, reports shortness of breath, reports cough that is improving. Abdomen: denies any abdominal pain, denies any nausea, denies any vomiting and denies any diarrhea Patient denies any blood loss. , Patient denies any hematemesis. , Patient denies any hematochezia., Patient denies any melena. and Patient denies any rectal bleeding. Skin: denies any rash and denies any itching  MSK: denies any myalgias and denies any arthralgias  Neuro: denies any headache and denies any seizure history  Psych: denies depression and denies anxiety        Objective:     Visit Vitals  BP (!) 120/56   Pulse (!) 104   Temp 98.4 °F (36.9 °C)   Resp 22   Ht 5' 5\" (1.651 m)   Wt 147 lb 0.8 oz (66.7 kg)   LMP  (LMP Unknown)   SpO2 95%   BMI 24.47 kg/m²     ECOG PS: 2- Ambulatory and capable of all selfcare but unable to carry out any work activities; up and about more than 50% of waking hours. Physical Exam  Constitutional:  No acute distress. Non-toxic appearance. Non-diaphoretic. Appears tired. HENT: Normocephalic and atraumatic head. Mouth/Throat: Oropharynx is clear. No oropharyngeal exudate. No oral mucositis. Eyes: No scleral icterus. Conjunctivae normal.   Cardiovascular: Heart sounds: Normal heart sounds. No friction rub. No gallop. No pitting/trace edema. Pulmonary: Pulmonary effort is normal. No respiratory distress. Breath sounds: No wheezing, rhonchi or rales. Minor coarse breath sounds bilaterally. Abdominal: General: Bowel sounds are normal. Palpations: Abdomen is soft. Tenderness: There is no abdominal tenderness. No guarding. Skin: Skin is not jaundiced. No rash. No petechiae. Musculoskeletal: No muscle pain on palpation. No temporal muscle wasting on inspection. Neurological: Alert and oriented to person, place, and time.  Mental status is at baseline. Psychiatric: mood normal. normal speech rate and normal affect    Results:     Lab Results   Component Value Date/Time    WBC 10.9 08/15/2021 03:06 AM    HGB 9.3 (L) 08/15/2021 03:06 AM    HCT 30.2 (L) 08/15/2021 03:06 AM    PLATELET 549 [birth year]1947 03:06 AM    MCV 86.8 08/15/2021 03:06 AM    ABS. NEUTROPHILS 10.5 (H) 08/14/2021 12:11 PM     Lab Results   Component Value Date/Time    Sodium 132 (L) 08/15/2021 03:06 AM    Potassium 4.5 08/15/2021 03:06 AM    Chloride 98 08/15/2021 03:06 AM    CO2 25 08/15/2021 03:06 AM    Glucose 108 (H) 08/15/2021 03:06 AM    BUN 33 (H) 08/15/2021 03:06 AM    Creatinine 1.48 (H) 08/15/2021 03:06 AM    GFR est AA 42 (L) 08/15/2021 03:06 AM    GFR est non-AA 34 (L) 08/15/2021 03:06 AM    Calcium 8.0 (L) 08/15/2021 03:06 AM    Sodium,  (L) 08/14/2021 03:39 PM    Potassium, POC 5.1 08/14/2021 03:39 PM    Chloride, POC 92 (L) 08/14/2021 03:39 PM    Glucose (POC) 171 (H) 08/15/2021 07:35 AM    Creatinine, POC 1.8 (H) 08/14/2021 03:39 PM    Calcium, ionized (POC) 1.12 08/14/2021 03:39 PM     Lab Results   Component Value Date/Time    Bilirubin, total 0.7 08/14/2021 12:11 PM    ALT (SGPT) 22 08/14/2021 12:11 PM    Alk. phosphatase 292 (H) 08/14/2021 12:11 PM    Protein, total 6.8 08/14/2021 12:11 PM    Albumin 2.1 (L) 08/14/2021 12:11 PM    Globulin 4.7 (H) 08/14/2021 12:11 PM       CT CHEST WO CONT    Result Date: 8/14/2021  INDICATION: History of lung cancer and pleural effusion requiring drainage. Evaluate for infiltrates or pericardial effusion. COMPARISON: CT ABD 6/23/2021. CT thorax 6/22/2021. CT thorax 6/10/2020. CONTRAST: None. TECHNIQUE:  5 mm axial images were obtained through the thorax. Coronal and sagittal reformats were generated. CT dose reduction was achieved through use of a standardized protocol tailored for this examination and automatic exposure control for dose modulation.  The absence of intravenous contrast reduces the sensitivity for evaluation of the mediastinum, harry, vasculature, and upper abdominal organs. FINDINGS: CHEST WALL: No mass or axillary lymphadenopathy. Right Port-A-Cath in place with catheter traversing expected course to the region of the atriocaval junction. THYROID: Heterogeneous thyroid enlargement redemonstrated with probable dominant nodule in the left gland measuring up to 2.9 x 3.7 cm, not significant change since 2020. MEDIASTINUM: No mass or lymphadenopathy. HARRY: No mass or lymphadenopathy. THORACIC AORTA: Atherosclerotic calcification without aneurysm. MAIN PULMONARY ARTERY: Normal in caliber. TRACHEA/BRONCHI: Patent. ESOPHAGUS: No wall thickening or dilatation. HEART: Moderate pleural effusion. Heart is normal in size. PLEURA: Left greater than right small pleural effusions. No pneumothorax. LUNGS: Patchy airspace infiltrates in the posterior left upper lobe and lingula. Atelectasis left lower lobe overlying effusion. No mass or nodule. INCIDENTALLY IMAGED UPPER ABDOMEN: Numerous hepatic metastases, increased in number and size from prior abdominal CT with largest lesions measuring 1.9 x 2.2 cm and 1.9 x 2.0 cm in the right lobe and 1.5 x 1.6 cm and left lobe. BONES: Degenerative spine change. No acute fracture or aggressive lesion. 1. Moderate pericardial effusion. 2. Bilateral patchy areas of pulmonary infiltrates concerning for pneumonia in appropriate clinical setting. 3. Small left greater than right pleural effusions. 4. Progressive hepatic metastases. XR CHEST PORT    Result Date: 6/22/2021  EXAM: XR CHEST PORT INDICATION: post thoracentesis  COMPARISON: 1001 hours FINDINGS: A portable AP radiograph of the chest was obtained at 1523 hours. The patient is on a cardiac monitor. There is no pneumothorax. The left pleural effusion is much smaller. . The cardiac and mediastinal contours and pulmonary vascularity are normal.  The bones and soft tissues are grossly within normal limits.      No pneumothorax following left-sided thoracentesis      I personally reviewed pertinent labs/results:   Lab Results   Component Value Date/Time    WBC 10.9 08/15/2021 03:06 AM    HGB 9.3 (L) 08/15/2021 03:06 AM    HCT 30.2 (L) 08/15/2021 03:06 AM    PLATELET 429 56/60/2382 03:06 AM    MCV 86.8 08/15/2021 03:06 AM     Lab Results   Component Value Date/Time    Sodium 132 (L) 08/15/2021 03:06 AM    Potassium 4.5 08/15/2021 03:06 AM    Chloride 98 08/15/2021 03:06 AM    CO2 25 08/15/2021 03:06 AM    Anion gap 9 08/15/2021 03:06 AM    Glucose 108 (H) 08/15/2021 03:06 AM    BUN 33 (H) 08/15/2021 03:06 AM    Creatinine 1.48 (H) 08/15/2021 03:06 AM    BUN/Creatinine ratio 22 (H) 08/15/2021 03:06 AM    GFR est AA 42 (L) 08/15/2021 03:06 AM    GFR est non-AA 34 (L) 08/15/2021 03:06 AM    Calcium 8.0 (L) 08/15/2021 03:06 AM    Bilirubin, total 0.7 08/14/2021 12:11 PM    Alk. phosphatase 292 (H) 08/14/2021 12:11 PM    Protein, total 6.8 08/14/2021 12:11 PM    Albumin 2.1 (L) 08/14/2021 12:11 PM    Globulin 4.7 (H) 08/14/2021 12:11 PM    A-G Ratio 0.4 (L) 08/14/2021 12:11 PM    ALT (SGPT) 22 08/14/2021 12:11 PM    AST (SGOT) 32 08/14/2021 12:11 PM       I personally reviewed pertinent referral notes:   Present reviewed hospitalist history and physical examination note. Patient being treated for healthcare associated pneumonia with empiric antibiotics and Vanco and cefepime. Also, cardiology consulted for pericardial effusion. Reportedly this has been pre-existing but has increased in size; primary team consulting cardiology. Assessment and Recommendations:       Metastasis from gastric cancer [facility])   Patient currently not a candidate for systemic therapy in the face of possible infection. Her primary oncologist will see her tomorrow in follow-up. High risk medication use   Discussed with patient that her candidacy for anti-HER-2 therapy may need to reconsider based on her pericardial effusion.   She may need a repeat echocardiogram to assess ejection fraction and function. I will ultimately defer to Dr. Sol Ibanez regarding patient's primary systemic antineoplastic therapy plan. Normocytic anemia   Likely multifactorial.  Consider renal failure, infection. Consider vitamin/mineral deficiency work-up. Could be anemia of chronic disease. Pericardial effusion   Cardiology being consulted. Unclear if related to acute illness. Unclear how quickly effusion has progressed. HCAP (healthcare-associated pneumonia)   On Vanco,Cefepime. Continue to follow. Patient non-toxic in appearance. Follow-Up:  Dr. Sol Ibanez to see patient in follow-up tomorrow. Please page our Hematology/Oncology On-Call Service if any questions.     Signed By:   Travis Musa MD

## 2021-08-15 NOTE — PROGRESS NOTES
TRANSFER - IN REPORT:    Verbal report received from Essie(name) on Scott Solares  being received from ED(unit) for routine progression of care      Report consisted of patients Situation, Background, Assessment and   Recommendations(SBAR). Information from the following report(s) SBAR was reviewed with the receiving nurse. Opportunity for questions and clarification was provided. Assessment completed upon patients arrival to unit and care assumed. Primary Nurse Sanjiv Munson and Candice Allred RN performed a dual skin assessment on this patient No impairment noted  Deshawn score is 22    End of Shift Note    Bedside shift change report given to Nel Norris (oncoming nurse) by Sanjiv Munson (offgoing nurse). Report included the following information SBAR    Shift worked:  night     Shift summary and any significant changes:     Pt stable with no pain. Concerns for physician to address:        Zone phone for oncoming shift:           Activity:  Activity Level: Up with Assistance  Number times ambulated in hallways past shift: 0  Number of times OOB to chair past shift: 1    Cardiac:   Cardiac Monitoring: Yes      Cardiac Rhythm: Sinus Rhythm    Access:   Current line(s): PIV     Genitourinary:   Urinary status: voiding    Respiratory:   O2 Device: None (Room air)  Chronic home O2 use?: NO  Incentive spirometer at bedside: NO     GI:  Last Bowel Movement Date: 08/12/21  Current diet:  ADULT DIET Regular  Passing flatus: YES  Tolerating current diet: YES       Pain Management:   Patient states pain is manageable on current regimen: YES    Skin:  Deshawn Score: 20  Interventions: turn team    Patient Safety:  Fall Score:  Total Score: 1  Interventions: bed/chair alarm       Length of Stay:  Expected LOS: - - -  Actual LOS: 1      Sanjiv Munson

## 2021-08-16 NOTE — PROGRESS NOTES
Hospitalist Progress Note    NAME: Charles Ards   :  1945   MRN:  [id number]       Assessment / Plan:  Healthcare associated pneumonia  Sepsis due to above  -CT shows evidence of bilateral patchy infiltrates. Rapid covid negative  C/w  empiric antibiotics with vancomycin and cefepime. She is on room air.  -Lactic acid is within normal limits     Pericardial effusion r/o mets from gastric adeno ca  -CT shows evidence of moderate pericardial effusion that is slightly bigger when compared to previous imaging. She is currently not in tamponade and hemodynamically stable. Cardiology consulted  -Evaluated by cardiology on 2021 and felt that there was no need for pericardiocentesis at that time since no malignant cells were detected in pleural fluid. 2D echo on  showed moderate pericardial effusion and large left pleural effusion  Patient is clinically not short of breath and is on room air, continue with Lasix to help with the pleural effusion. If she becomes hypoxic or short of breath will consider thoracocentesis    Mild hyperkalemia resolved  Mild hyponatremia  Chronic diastolic congestive heart failure  -Continue home Lasix.    -Hold home ACE inhibitor which is contributing to hyperkalemia  -Strict I's and O's, daily weights and low-salt diet     Diabetes mellitus type 2  Dyslipidemia  History of renal cell carcinoma s/p nephrectomy  Metastatic gastric adenocarcinoma with mets to liver  -hold home glipizide. c/w insulin sliding scale blood sugar checks  -Cont lipitor  -consult oncology     CKD stage III  -Creatinine is close to baseline of around 1.3-1.5. Repeat BMP in a.m.     Incidental thyroid nodule  -not significantly changed per report from .  Arrange out pt f/u with PCP.     Anxiety     Code Status: full   Surrogate Decision Maker:      DVT Prophylaxis: Lovenox  GI Prophylaxis: not indicated     Baseline: From home independent   Estimated discharge:   Barriers: Clniical improvement. Subjective:     Chief Complaint / Reason for Physician Visit  F/u for Pneumonia  No acute complaints  On room air, no shortness of breath, no chest pain    Review of Systems:  Symptom Y/N Comments  Symptom Y/N Comments   Fever/Chills n   Chest Pain n    Poor Appetite n   Edema n    Cough    Abdominal Pain     Sputum    Joint Pain     SOB/NEWTON    Pruritis/Rash     Nausea/vomit    Tolerating PT/OT     Diarrhea    Tolerating Diet     Constipation    Other       Could NOT obtain due to:      Objective:     VITALS:   Last 24hrs VS reviewed since prior progress note. Most recent are:  Patient Vitals for the past 24 hrs:   Temp Pulse Resp BP SpO2   08/16/21 0748 98.5 °F (36.9 °C) 99 21 127/64 93 %   08/16/21 0257 97.6 °F (36.4 °C) 94 18 131/63 95 %   08/15/21 2256 98.9 °F (37.2 °C) 100 22 122/65 94 %   08/15/21 2046 98.6 °F (37 °C) (!) 104 23 100/78 94 %   08/15/21 1504 98.5 °F (36.9 °C) (!) 104 19 112/75 93 %   08/15/21 1146 98.6 °F (37 °C) (!) 101 20 113/60 94 %   08/15/21 0911 98.4 °F (36.9 °C) (!) 104 22 (!) 120/56 95 %       Intake/Output Summary (Last 24 hours) at 8/16/2021 0845  Last data filed at 8/15/2021 1739  Gross per 24 hour   Intake 1210 ml   Output 500 ml   Net 710 ml        I had a face to face encounter and independently examined this patient on 8/16/2021, as outlined below:  PHYSICAL EXAM:  General: WD, WN. Alert, cooperative, no acute distress    EENT:  EOMI. Anicteric sclerae. MMM  Resp:  CTA bilaterally, no wheezing or rales. No accessory muscle use  CV:  Regular  rhythm,  No edema  GI:  Soft, Non distended, Non tender. +Bowel sounds  Neurologic:  Alert and oriented X 3, normal speech,   Psych:   Good insight. Not anxious nor agitated  Skin:  No rashes.   No jaundice    Reviewed most current lab test results and cultures  YES  Reviewed most current radiology test results   YES  Review and summation of old records today    NO  Reviewed patient's current orders and [facility] YES  PMH/SH reviewed - no change compared to H&P  ________________________________________________________________________  Care Plan discussed with:    Comments   Patient y    Family      RN y    Care Manager     Consultant                        Multidiciplinary team rounds were held today with , nursing, pharmacist and clinical coordinator. Patient's plan of care was discussed; medications were reviewed and discharge planning was addressed. ________________________________________________________________________  Total NON critical care TIME:  35   Minutes    Total CRITICAL CARE TIME Spent:   Minutes non procedure based      Comments   >50% of visit spent in counseling and coordination of care     ________________________________________________________________________  Viola Miller MD     Procedures: see electronic medical records for all procedures/Xrays and details which were not copied into this note but were reviewed prior to creation of Plan. LABS:  I reviewed today's most current labs and imaging studies.   Pertinent labs include:  Recent Labs     08/16/21  0001 08/15/21  0306 08/14/21  1211   WBC 12.0* 10.9 12.6*   HGB 9.3* 9.3* 11.5   HCT 30.7* 30.2* 37.5    320 468*     Recent Labs     08/16/21  0001 08/15/21  0306 08/14/21  1211   * 132* 129*   K 4.6 4.5 5.3*    98 95*   CO2 21 25 27   * 108* 159*   BUN 31* 33* 33*   CREA 1.41* 1.48* 1.56*   CA 7.7* 8.0* 8.4*   ALB 1.5*  --  2.1*   TBILI 0.5  --  0.7   ALT 18  --  22       Signed: Viola Miller MD

## 2021-08-16 NOTE — PROGRESS NOTES
End of Shift Note    Bedside shift change report given to [facility] (oncoming nurse) by Suzanne Almendarez (offgoing nurse). Report included the following information SBAR    Shift worked:  night     Shift summary and any significant changes:     Pt and  signed Advanced Directives today, nurses as witnesses. Original in chart, copy sent home with . Concerns for physician to address:        Zone phone for oncoming shift:           Activity:  Activity Level: Up with Assistance  Number times ambulated in hallways past shift: 0  Number of times OOB to chair past shift: 0    Cardiac:   Cardiac Monitoring: Yes      Cardiac Rhythm: Sinus Rhythm    Access:   Current line(s): PIV     Genitourinary:   Urinary status: voiding    Respiratory:   O2 Device: None (Room air)  Chronic home O2 use?: YES  Incentive spirometer at bedside: YES     GI:  Last Bowel Movement Date: 07/12/21  Current diet:  ADULT DIET Regular  Passing flatus: YES  Tolerating current diet: YES       Pain Management:   Patient states pain is manageable on current regimen: YES    Skin:  Deshawn Score: 20  Interventions: turn team    Patient Safety:  Fall Score:  Total Score: 1  Interventions: bed/chair alarm       Length of Stay:  Expected LOS: - - -  Actual LOS: 2      Suzanne Almendarez

## 2021-08-16 NOTE — PROGRESS NOTES
[address] [facility] [address], [address] [facility], [address] [facility], 200 [facility]  [phone]    PROGRESS NOTE   Date of Visit: 08/16/21  Reason for Visit:     Chief Complaint   Patient presents with    Shortness of Breath     pt was recently dx with stomach cancer w/mets to liver. She is having sob since last night. She says that she has had this before and has had to have fluid drained. Subjective:   Aristeo Diego is a 68 y.o. 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 Also, with a history of renal cancer. . Patient reports that she is dealing with shortness of breath. She tells me that she seemed to feel that something was off for the past few days.  corroborates that she has been able to maintain most activities of daily living but more recently has had some difficulties. She denies any headaches or acute vision changes. She denies any severe uncontrolled pain. She notes that she is feeling slightly better today. However, she also has been found to have pericardial effusion. She reportedly is set to start on 8/17/2021 Xeloda, oxaliplatin,pembrolizumab, and Herceptin. She reports adequate oral intake of food and hydration. Patient denies any bowel or bladder problems. Interval History: Patient resting comfortably. Breathing has improved since admission. She reports \"today is a good day\". Discussed delaying treatment until next week. Patient aware that she needs to mobilize and improve nutrition. She wishes to pursue treatment.        Past Medical History:   Diagnosis Date    Chronic kidney disease     Diabetes ([facility] [address]     Type II    History of kidney cancer 06/2020    stage 2, s/p nephrectomy, no further tx necessary    Hypercholesterolemia     Hypertension     Pleural effusion, left 6/23/2021    Psychiatric disorder     anxiety Past Surgical History:   Procedure Laterality Date    COLONOSCOPY N/A 12/10/2019    COLONOSCOPY performed by Stuart [facility]dy [facility] at [facility] ENDOSCOPY    HX CATARACT REMOVAL Bilateral 2015    HX CATARACT REMOVAL  01/09/2018    secondary cataracts removed    HX CHOLECYSTECTOMY  1993    HX DILATION AND CURETTAGE  2000    HX HYSTERECTOMY  2000    complete    HX NEPHRECTOMY Right 2020    stage 2 cancer, no further tx needed    IR INSERT TUNL CVC W PORT OVER 5 YEARS  7/26/2021      Social History     Tobacco Use    Smoking status: Never Smoker    Smokeless tobacco: Never Used   Substance Use Topics    Alcohol use: No     Alcohol/week: 0.0 standard drinks      Family History   Adopted: Yes   Problem Relation Age of Onset    Cancer Daughter         lung    Cancer Son         colon     Current Facility-Administered Medications   Medication Dose Route Frequency    Vancomycin Trough- Please draw prior to 1600 dose today  1 Each Other ONCE    acetaminophen (TYLENOL) tablet 650 mg  650 mg Oral Q6H PRN    ondansetron (ZOFRAN) injection 4 mg  4 mg IntraVENous Q4H PRN    amitriptyline (ELAVIL) tablet 10 mg  10 mg Oral QHS    furosemide (LASIX) tablet 40 mg  40 mg Oral DAILY    atorvastatin (LIPITOR) tablet 20 mg  20 mg Oral DAILY    sodium chloride (NS) flush 5-40 mL  5-40 mL IntraVENous Q8H    sodium chloride (NS) flush 5-40 mL  5-40 mL IntraVENous PRN    polyethylene glycol (MIRALAX) packet 17 g  17 g Oral DAILY PRN    enoxaparin (LOVENOX) injection 30 mg  30 mg SubCUTAneous DAILY    cefepime (MAXIPIME) 2 g in 0.9% sodium chloride (MBP/ADV) 100 mL MBP  2 g IntraVENous Q24H    vancomycin (VANCOCIN) 750 mg in 0.9% sodium chloride 250 mL (VIAL-MATE)  750 mg IntraVENous Q24H    insulin lispro (HUMALOG) injection   SubCUTAneous AC&HS    glucose chewable tablet 16 g  4 Tablet Oral PRN    dextrose (D50W) injection syrg 12.5-25 g  12.5-25 g IntraVENous PRN    glucagon (GLUCAGEN) injection 1 mg  1 mg IntraMUSCular PRN      No Known Allergies   Review of Systems provided by: patient  General: denies fever and reports fatigue  HEENT: denies epistaxis and denies trouble swallowing  Eyes: denies any vision loss and denies any eye pain  Cardio: denies any chest pain and denies any leg swelling  Resp: denies any hemoptysis, reports shortness of breath that is improved since admission, reports cough that is improving. Abdomen: denies any abdominal pain, denies any nausea, denies any vomiting and denies any diarrhea Patient denies any blood loss. , Patient denies any hematemesis. , Patient denies any hematochezia., Patient denies any melena. and Patient denies any rectal bleeding. Skin: denies any rash and denies any itching  MSK: denies any myalgias and denies any arthralgias  Neuro: denies any headache and denies any seizure history  Psych: denies depression and denies anxiety        Objective:     Visit Vitals  /70 (BP 1 Location: Right arm, BP Patient Position: At rest)   Pulse 94   Temp 98.5 °F (36.9 °C)   Resp 21   Ht 5' 5\" (1.651 m)   Wt 147 lb 0.8 oz (66.7 kg)   LMP  (LMP Unknown)   SpO2 95%   BMI 24.47 kg/m²     ECOG PS: 2- Ambulatory and capable of all selfcare but unable to carry out any work activities; up and about more than 50% of waking hours. Physical Exam  Constitutional:  No acute distress. Non-toxic appearance. Non-diaphoretic. Appears tired. HENT: Normocephalic and atraumatic head. Mouth/Throat: Oropharynx is clear. No oropharyngeal exudate. No oral mucositis. Eyes: No scleral icterus. Conjunctivae normal.   Cardiovascular: Heart sounds: Normal heart sounds. No friction rub. No gallop. No pitting/trace edema. Pulmonary: Pulmonary effort is normal. No respiratory distress. Breath sounds: No wheezing, rhonchi or rales. Abdominal: General: Bowel sounds are normal. Palpations: Abdomen is soft. Tenderness: There is no abdominal tenderness. No guarding. Skin: Skin is not jaundiced.  No rash. No petechiae. Musculoskeletal: No muscle pain on palpation. No temporal muscle wasting on inspection. Neurological: Alert and oriented to person, place, and time. Mental status is at baseline. Psychiatric: mood normal. normal speech rate and normal affect    Results:     Lab Results   Component Value Date/Time    WBC 12.0 (H) 08/16/2021 12:01 AM    HGB 9.3 (L) 08/16/2021 12:01 AM    HCT 30.7 (L) 08/16/2021 12:01 AM    PLATELET 543 86/22/5680 12:01 AM    MCV 87.2 08/16/2021 12:01 AM    ABS. NEUTROPHILS 10.5 (H) 08/14/2021 12:11 PM     Lab Results   Component Value Date/Time    Sodium 129 (L) 08/16/2021 12:01 AM    Potassium 4.6 08/16/2021 12:01 AM    Chloride 100 08/16/2021 12:01 AM    CO2 21 08/16/2021 12:01 AM    Glucose 113 (H) 08/16/2021 12:01 AM    BUN 31 (H) 08/16/2021 12:01 AM    Creatinine 1.41 (H) 08/16/2021 12:01 AM    GFR est AA 44 (L) 08/16/2021 12:01 AM    GFR est non-AA 36 (L) 08/16/2021 12:01 AM    Calcium 7.7 (L) 08/16/2021 12:01 AM    Sodium,  (L) 08/14/2021 03:39 PM    Potassium, POC 5.1 08/14/2021 03:39 PM    Chloride, POC 92 (L) 08/14/2021 03:39 PM    Glucose (POC) 124 (H) 08/16/2021 12:26 PM    Creatinine, POC 1.8 (H) 08/14/2021 03:39 PM    Calcium, ionized (POC) 1.12 08/14/2021 03:39 PM     Lab Results   Component Value Date/Time    Bilirubin, total 0.5 08/16/2021 12:01 AM    ALT (SGPT) 18 08/16/2021 12:01 AM    Alk. phosphatase 233 (H) 08/16/2021 12:01 AM    Protein, total 5.7 (L) 08/16/2021 12:01 AM    Albumin 1.5 (L) 08/16/2021 12:01 AM    Globulin 4.2 (H) 08/16/2021 12:01 AM       CT CHEST WO CONT    Result Date: 8/14/2021  INDICATION: History of lung cancer and pleural effusion requiring drainage. Evaluate for infiltrates or pericardial effusion. COMPARISON: CT ABD 6/23/2021. CT thorax 6/22/2021. CT thorax 6/10/2020. CONTRAST: None. TECHNIQUE:  5 mm axial images were obtained through the thorax. Coronal and sagittal reformats were generated.   CT dose reduction was achieved through use of a standardized protocol tailored for this examination and automatic exposure control for dose modulation. The absence of intravenous contrast reduces the sensitivity for evaluation of the mediastinum, harry, vasculature, and upper abdominal organs. FINDINGS: CHEST WALL: No mass or axillary lymphadenopathy. Right Port-A-Cath in place with catheter traversing expected course to the region of the atriocaval junction. THYROID: Heterogeneous thyroid enlargement redemonstrated with probable dominant nodule in the left gland measuring up to 2.9 x 3.7 cm, not significant change since 2020. MEDIASTINUM: No mass or lymphadenopathy. HARRY: No mass or lymphadenopathy. THORACIC AORTA: Atherosclerotic calcification without aneurysm. MAIN PULMONARY ARTERY: Normal in caliber. TRACHEA/BRONCHI: Patent. ESOPHAGUS: No wall thickening or dilatation. HEART: Moderate pleural effusion. Heart is normal in size. PLEURA: Left greater than right small pleural effusions. No pneumothorax. LUNGS: Patchy airspace infiltrates in the posterior left upper lobe and lingula. Atelectasis left lower lobe overlying effusion. No mass or nodule. INCIDENTALLY IMAGED UPPER ABDOMEN: Numerous hepatic metastases, increased in number and size from prior abdominal CT with largest lesions measuring 1.9 x 2.2 cm and 1.9 x 2.0 cm in the right lobe and 1.5 x 1.6 cm and left lobe. BONES: Degenerative spine change. No acute fracture or aggressive lesion. 1. Moderate pericardial effusion. 2. Bilateral patchy areas of pulmonary infiltrates concerning for pneumonia in appropriate clinical setting. 3. Small left greater than right pleural effusions. 4. Progressive hepatic metastases. XR CHEST PORT    Result Date: 6/22/2021  EXAM: XR CHEST PORT INDICATION: post thoracentesis  COMPARISON: 1001 hours FINDINGS: A portable AP radiograph of the chest was obtained at 1523 hours. The patient is on a cardiac monitor. There is no pneumothorax.  The left pleural effusion is much smaller. . The cardiac and mediastinal contours and pulmonary vascularity are normal.  The bones and soft tissues are grossly within normal limits. No pneumothorax following left-sided thoracentesis      I personally reviewed pertinent labs/results:   Lab Results   Component Value Date/Time    WBC 12.0 (H) 08/16/2021 12:01 AM    HGB 9.3 (L) 08/16/2021 12:01 AM    HCT 30.7 (L) 08/16/2021 12:01 AM    PLATELET 376 50/54/7329 12:01 AM    MCV 87.2 08/16/2021 12:01 AM     Lab Results   Component Value Date/Time    Sodium 129 (L) 08/16/2021 12:01 AM    Potassium 4.6 08/16/2021 12:01 AM    Chloride 100 08/16/2021 12:01 AM    CO2 21 08/16/2021 12:01 AM    Anion gap 8 08/16/2021 12:01 AM    Glucose 113 (H) 08/16/2021 12:01 AM    BUN 31 (H) 08/16/2021 12:01 AM    Creatinine 1.41 (H) 08/16/2021 12:01 AM    BUN/Creatinine ratio 22 (H) 08/16/2021 12:01 AM    GFR est AA 44 (L) 08/16/2021 12:01 AM    GFR est non-AA 36 (L) 08/16/2021 12:01 AM    Calcium 7.7 (L) 08/16/2021 12:01 AM    Bilirubin, total 0.5 08/16/2021 12:01 AM    Alk. phosphatase 233 (H) 08/16/2021 12:01 AM    Protein, total 5.7 (L) 08/16/2021 12:01 AM    Albumin 1.5 (L) 08/16/2021 12:01 AM    Globulin 4.2 (H) 08/16/2021 12:01 AM    A-G Ratio 0.4 (L) 08/16/2021 12:01 AM    ALT (SGPT) 18 08/16/2021 12:01 AM    AST (SGOT) 29 08/16/2021 12:01 AM       I personally reviewed pertinent referral notes:   Present reviewed hospitalist history and physical examination note. Patient being treated for healthcare associated pneumonia with empiric antibiotics and Vanco and cefepime. Also, cardiology consulted for pericardial effusion. Reportedly this has been pre-existing but has increased in size; primary team consulting cardiology. Assessment and Recommendations:     Metastasis from gastric cancer [facility])   Patient currently not a candidate for systemic therapy in the face of possible infection.   Her primary oncologist will see her tomorrow in follow-up.  Will delay treatment until next week to allow patient time to recover from acute events. High risk medication use   Discussed with patient that her candidacy for anti-HER-2 therapy may need to reconsider based on her pericardial effusion. She may need a repeat echocardiogram to assess ejection fraction and function. I will ultimately defer to Dr. Bimal Blair regarding patient's primary systemic antineoplastic therapy plan.  Echocardiogram today shows normal left ventricular function with an estimated LVEF of 60-65%. Therefore, plan to proceed with anti-HER-2 targeted therapy as scheduled. Normocytic anemia   Likely multifactorial.  Consider renal failure, infection. Consider vitamin/mineral deficiency work-up. Could be anemia of chronic disease.  Continue to monitor. Pericardial effusion   Cardiology being consulted. Unclear if related to acute illness. Unclear how quickly effusion has progressed. [facility] with no plan for pericardial tap. Patient is clinically improved.  Continue lasix. HCAP (healthcare-associated pneumonia)   On Vanco,Cefepime. Continue to follow. Patient non-toxic in appearance.  Continue antibiotics. Follow-Up:  · Delay treatment with CapOx, pembrolizumab, and trastuzumab until next week. Tentatively plan for treatment on 8/24/21 at 8:00am.   · Follow-up outpatient with start of treatment.     Signed By:   Monty Perez NP

## 2021-08-16 NOTE — PROGRESS NOTES
Spiritual Care Assessment/Progress Note  [facility]      NAME: Jovita Tena      MRN: [id number]  AGE: 68 y.o.  SEX: female  [place] Affiliation: Synagogue   Language: English     8/16/2021     Total Time (in minutes): 17     Spiritual Assessment begun in [facility] CARDIOPULMONARY CARE through conversation with:         [x]Patient        [] Family    [] Friend(s)        Reason for Consult: Palliative Care, Initial/Spiritual Assessment     Spiritual beliefs: (Please include comment if needed)     [x] Identifies with a anastasia tradition: Synagogue         [] Supported by a anastasia community:            [] Claims no spiritual orientation:           [] Seeking spiritual identity:                [] Adheres to an individual form of spirituality:           [] Not able to assess:                           Identified resources for coping:      [x] Prayer                               [] Music                  [] Guided Imagery     [x] Family/friends                 [] Pet visits     [] Devotional reading                         [] Unknown     [] Other:                                               Interventions offered during this visit: (See comments for more details)    Patient Interventions: Affirmation of emotions/emotional suffering, Affirmation of anastasia, Initial visit, Initial/Spiritual assessment, patient floor, Coping skills reviewed/reinforced, Catharsis/review of pertinent events in supportive environment, Life review/legacy, Prayer (assurance of)           Plan of Care:     [] Support spiritual and/or cultural needs    [] Support AMD and/or advance care planning process      [] Support grieving process   [] Coordinate Rites and/or Rituals    [] Coordination with community clergy   [] No spiritual needs identified at this time   [] Detailed Plan of Care below (See Comments)  [] Make referral to Music Therapy  [] Make referral to Pet Therapy     [] Make referral to Addiction services  [] Make referral to [facility]  [] Make referral to Spiritual Care Partner  [] No future visits requested        [x] Follow up upon further referrals     Comments:     Initial Palliative Care Assessment visit for Ms. Norman in 2211. Patient was alert, no family was present during the visit. Patient shared about her medical journey and life journey. She shared about her cancer, her pain, God's answer to her prayer, her loss of children by cancer however, she prominently expressed her trust and anastasia in God. She assessed her current status as \"at peace\".  provided attentive listening and affirming her anastasia during the visit. The visit was ended by her medical intervention. Advised of 24/7  availability.       1478[address] [facility] Aashish Velásquez., M.S., Th.M.  Spiritual Care Provider   Paging Service [phone]

## 2021-08-16 NOTE — CONSULTS
Palliative Medicine Consult  Andrea: [phone] ([phone])    Patient Name: Sushma Rose  YOB: 1945    Date of Initial Consult: 8/16/21  Reason for Consult: care, decisions  Requesting Provider: Juan Dash MD  Primary Care Physician: Edgar Sosa MD     SUMMARY:   Sushma Rose is a 68 y.o. female with a past history of renal cell carcinoma diagnosed 8/20/2020 s/p nephrectomy recent diagnosis of metastatic gastric adenocarcinoma with liver mets,pericardial effusion evaluated by cardiology on 6/23/2021 suggested no need for pericardio centesis no malignant cells detected. She was admitted on 8/14/2021 from home with a diagnosis of sepsis due to healthcare associated pneumonia. Other past medical history: Diabetes mellitus, dyslipidemia, CKD stage III. Chronic diastolic heart failure. Patient was seen by covering oncologist over the weekend and not a candidate for systemic therapy in the face of possible infection however awaiting recommendation by her primary oncologist Dr. Tonya Valverde. Cardiology following awaiting echocardiogram to rule out cardiac tamponade. Current medical issues leading to Palliative Medicine involvement include: Metastatic cancer with recurrent pericardial and pleural effusion,, sepsis, high risk of decline currently full code. Primary oncologist Dr. Martha Hanna. Social history: Lives with her , who is very supportive, they do not have any children. At baseline patient is independent in function prior to hospitalization. PALLIATIVE DIAGNOSES:   1. Shortness of breath  2. Weakness  3. Goals of care  4. Metastatic GI cancer with liver mets and pericardial effusion. PLAN:   1. Prior to visit I I reviewed chart, spoke to bedside RN. 2. Met with the patient who is currently not in any distress, introduced my role and realm of palliative service. 3. Patient has fair understanding of her medical issues.   4. She she tells me she has a good outlook and is hoping to get chemotherapy, we discussed currently chemotherapy is not an option because she is weak due to pneumonia/infection, however she is awaiting for her primary oncologist Dr. Howard Narayanan to visit for his recommendation. 5. Advanced directive: In place, she has appointed her  Mr. Ross Garcia primary medical POA. 6. I will follow up on further conversation regarding CODE STATUS . 7. Initial consult note routed to primary continuity provider and/or primary health care team members  8. Communicated plan of care with: Palliative IDT, [facility] Team     GOALS OF CARE / TREATMENT PREFERENCES:     GOALS OF CARE:  Patient/Health Care Proxy Stated Goals:  (Best possible recovery, and chemotherapy if suggested.)    TREATMENT PREFERENCES:   Code Status: Full Code    Advance Care Planning:  [x] The [facility] Interdisciplinary Team has updated the ACP Navigator with Health Care Decision Maker and Patient Capacity      Primary [facility]nest [facility] - [phone]  Advance Care Planning 8/15/2021   Confirm Advance Directive None   Patient Would Like to Complete Advance Directive -             Other Instructions: Other:    As far as possible, the palliative care team has discussed with patient / health care proxy about goals of care / treatment preferences for patient. HISTORY:     History obtained from: Chart, patient and bedside RN. CHIEF COMPLAINT: \" I feel good\"    HPI/SUBJECTIVE:    The patient is:   [] Verbal and participatory  Patient note she was admitted because she woke up with lot of pressure and shortness of breath on Saturday, without any fever cough, and decided to come to the ER for evaluation. .  She notes shortness of breath is better however, she still still needs to take deep breath and it is \"not enough\"    She feels weak and tired, denies any chest pain, fever, chills, cough. Her appetite is okay, bowels are moving. .  Clinical Pain Assessment (nonverbal scale for severity on nonverbal patients):   Clinical Pain Assessment  Severity: 0          Duration: for how long has pt been experiencing pain (e.g., 2 days, 1 month, years)  Frequency: how often pain is an issue (e.g., several times per day, once every few days, constant)     FUNCTIONAL ASSESSMENT:     Palliative Performance Scale (PPS):          PSYCHOSOCIAL/SPIRITUAL SCREENING:     Palliative IDT has assessed this patient for cultural preferences / practices and a referral made as appropriate to needs (Cultural Services, Patient Advocacy, Ethics, etc.)    Any spiritual / Zoroastrianism concerns:  [] Yes /  [x] No    Caregiver Burnout:  [] Yes /  [x] No /  [] No Caregiver Present      Anticipatory grief assessment:   [x] Normal  / [] Maladaptive       ESAS Anxiety: Anxiety: 0    ESAS Depression:          REVIEW OF SYSTEMS:     Positive and pertinent negative findings in ROS are noted above in HPI. The following systems were [x] reviewed / [] unable to be reviewed as noted in HPI  Other findings are noted below. Systems: constitutional, ears/nose/mouth/throat, respiratory, gastrointestinal, genitourinary, musculoskeletal, integumentary, neurologic, psychiatric, endocrine. Positive findings noted below. Modified ESAS Completed by: provider   Fatigue: 8 Drowsiness: 0     Pain: 0   Anxiety: 0 Nausea: 0   Anorexia: 6 Dyspnea: 1     Constipation: No              PHYSICAL EXAM:     From RN flowsheet:  Wt Readings from Last 3 Encounters:   08/16/21 147 lb 0.8 oz (66.7 kg)   08/02/21 154 lb (69.9 kg)   07/26/21 160 lb (72.6 kg)     Blood pressure (!) 129/57, pulse (!) 104, temperature 98.5 °F (36.9 °C), resp. rate 21, height 5' 5\" (1.651 m), weight 147 lb 0.8 oz (66.7 kg), SpO2 93 %.     Pain Scale 1: Numeric (0 - 10)  Pain Intensity 1: 3  Pain Onset 1: acute  Pain Location 1: Generalized  Pain Orientation 1: Anterior  Pain Description 1: Aching  Pain Intervention(s) 1: Medication (see MAR)  Last bowel movement, if known:     Constitutional: Alert oriented x3, pleasant, engages in conversation. Eyes: pupils equal, anicteric  ENMT: no nasal discharge, moist mucous membranes  Cardiovascular: regular rhythm, no edema  Respiratory: breathing not labored, symmetric, clear breath sounds. Gastrointestinal: soft non-tender, +bowel sounds, no masses. Musculoskeletal: no deformity, no tenderness to palpation  Skin: warm, dry  Neurologic: following commands, moving all extremities  Psychiatric: full affect, no hallucinations  Other:       HISTORY:     Active Problems:    Pericardial effusion (8/14/2021)      HCAP (healthcare-associated pneumonia) (8/14/2021)      Metastasis from gastric cancer ([facility] [address] (8/15/2021)      High risk medication use (8/15/2021)      Normocytic anemia (8/15/2021)      Past Medical History:   Diagnosis Date    Chronic kidney disease     Diabetes ([facility] [address]     Type II    History of kidney cancer 06/2020    stage 2, s/p nephrectomy, no further tx necessary    Hypercholesterolemia     Hypertension     Pleural effusion, left 6/23/2021    Psychiatric disorder     anxiety      Past Surgical History:   Procedure Laterality Date    COLONOSCOPY N/A 12/10/2019    COLONOSCOPY performed by Obed Montanez MD at [facility] ENDOSCOPY    HX CATARACT REMOVAL Bilateral 2015    HX CATARACT REMOVAL  01/09/2018    secondary cataracts removed    HX CHOLECYSTECTOMY  1993    HX DILATION AND CURETTAGE  2000    HX HYSTERECTOMY  2000    complete    HX NEPHRECTOMY Right 2020    stage 2 cancer, no further tx needed    IR INSERT TUNL CVC W PORT OVER 5 YEARS  7/26/2021      Family History   Adopted: Yes   Problem Relation Age of Onset    Cancer Daughter         lung    Cancer Son         colon      History reviewed, no pertinent family history.   Social History     Tobacco Use    Smoking status: Never Smoker    Smokeless tobacco: Never Used   Substance Use Topics    Alcohol use: No     Alcohol/week: 0.0 standard drinks     No Known Allergies   Current Facility-Administered Medications   Medication Dose Route Frequency    Vancomycin Trough- Please draw prior to 1600 dose today  1 Each Other ONCE    acetaminophen (TYLENOL) tablet 650 mg  650 mg Oral Q6H PRN    ondansetron (ZOFRAN) injection 4 mg  4 mg IntraVENous Q4H PRN    amitriptyline (ELAVIL) tablet 10 mg  10 mg Oral QHS    furosemide (LASIX) tablet 40 mg  40 mg Oral DAILY    atorvastatin (LIPITOR) tablet 20 mg  20 mg Oral DAILY    sodium chloride (NS) flush 5-40 mL  5-40 mL IntraVENous Q8H    sodium chloride (NS) flush 5-40 mL  5-40 mL IntraVENous PRN    polyethylene glycol (MIRALAX) packet 17 g  17 g Oral DAILY PRN    enoxaparin (LOVENOX) injection 30 mg  30 mg SubCUTAneous DAILY    cefepime (MAXIPIME) 2 g in 0.9% sodium chloride (MBP/ADV) 100 mL MBP  2 g IntraVENous Q24H    vancomycin (VANCOCIN) 750 mg in 0.9% sodium chloride 250 mL (VIAL-MATE)  750 mg IntraVENous Q24H    insulin lispro (HUMALOG) injection   SubCUTAneous AC&HS    glucose chewable tablet 16 g  4 Tablet Oral PRN    dextrose (D50W) injection syrg 12.5-25 g  12.5-25 g IntraVENous PRN    glucagon (GLUCAGEN) injection 1 mg  1 mg IntraMUSCular PRN          LAB AND IMAGING FINDINGS:     Lab Results   Component Value Date/Time    WBC 12.0 (H) 08/16/2021 12:01 AM    HGB 9.3 (L) 08/16/2021 12:01 AM    PLATELET 040 45/86/0258 12:01 AM     Lab Results   Component Value Date/Time    Sodium 129 (L) 08/16/2021 12:01 AM    Potassium 4.6 08/16/2021 12:01 AM    Chloride 100 08/16/2021 12:01 AM    CO2 21 08/16/2021 12:01 AM    BUN 31 (H) 08/16/2021 12:01 AM    Creatinine 1.41 (H) 08/16/2021 12:01 AM    Calcium 7.7 (L) 08/16/2021 12:01 AM    Magnesium 1.8 12/29/2020 12:25 PM    Phosphorus 2.6 08/12/2020 01:07 PM      Lab Results   Component Value Date/Time    Alk.  phosphatase 233 (H) 08/16/2021 12:01 AM    Protein, total 5.7 (L) 08/16/2021 12:01 AM    Albumin 1.5 (L) 08/16/2021 12:01 AM    Globulin 4.2 (H) 08/16/2021 12:01 AM     Lab Results   Component Value Date/Time    INR 1.0 08/12/2020 01:07 PM    Prothrombin time 10.0 08/12/2020 01:07 PM    aPTT 23.1 08/12/2020 01:07 PM      No results found for: IRON, FE, TIBC, IBCT, PSAT, FERR   No results found for: PH, PCO2, PO2  No components found for: [place]   Lab Results   Component Value Date/Time    CK - MB 2.4 08/14/2021 12:11 PM                Total time: 70 mins  Counseling / coordination time, spent as noted above: 45 mins  > 50% counseling / coordination?: yes     Prolonged service was provided for  []30 min   []75 min in face to face time in the presence of the patient, spent as noted above. Time Start:   Time End:   Note: this can only be billed with 54844 (initial) or 91242 (follow up). If multiple start / stop times, list each separately.

## 2021-08-16 NOTE — PROGRESS NOTES
Pharmacy Antimicrobial Kinetic Dosing    Indication for Antimicrobials: HAP    Current Regimen of Each Antimicrobial:  Cefepime 2 g IV every 24 hour (Start Date ; Day # 3)  Vancomycin 750 mg IV every 24 hours (Start Date ; Day # 3)    Previous Antimicrobial Therapy:  Zosyn x 1    Goal Level: AUC: 400-600 mg/hr/Liter/day    Date Dose & Interval Measured (mcg/mL) Extrapolated (mcg/mL)   21 at 1536 750 mg q 24h 12.8 15.2                 Date & time of next level:  @ 1600    Significant Positive Cultures:    Blood: NGTD x 2 days - Prelim    Conditions for Dosing Consideration: None    Labs:  Recent Labs     21  0001 08/15/21  0306 21  1211   CREA 1.41* 1.48* 1.56*   BUN 31* 33* 33*     Recent Labs     21  0001 08/15/21  0306 21  1211   WBC 12.0* 10.9 12.6*     Temp (24hrs), Av.4 °F (36.9 °C), Min:97.6 °F (36.4 °C), Max:98.9 °F (37.2 °C)        Creatinine Clearance (mL/min):   CrCl (Ideal Body Weight): 30.5   If actual weight < IBW: CrCl (Actual Body Weight) 35.7    Impression/Plan:   Renal function currently borderline for cefepime 2 g IV every 12 hours, will continue at this time. Vancomycin level came back at 12.8. Will keep current regimen per [facility]. Antimicrobial stop date TBD     Pharmacy will follow daily and adjust medications as appropriate for renal function and/or serum levels.     Thank you,  Lexi Zabala, PHARMD    Vancomycin Dosing Document    Documents located on pharmacy Teams site: Clinical Practice -> Antimicrobial Stewardship -> Antibiotics_Vancomycin     Aminoglycoside Dosing Document    Documents located on pharmacy Teams site: Clinical Practice -> Antimicrobial Stewardship -> Antibiotics_Aminoglycosides

## 2021-08-16 NOTE — PROGRESS NOTES
0700 Bedside shift change report given to ELMER Gee and Santosh Kemp RN (oncoming nurse) by Rick Diggs RN (offgoing nurse). Report included the following information SBAR, Kardex, Intake/Output, MAR, Recent Results and Cardiac Rhythm NSR.    0715 Pt resting quietly in bed at this time. VSS. Plan to have echo today. 0900 Dr. Ann Marie Jiménez at bedside. Plan to consult palliative care today. 1115 Pt off the floor for echo. 1230 Pt back in room after echocardiogram. VSS.     1900 End of Shift Note    Bedside shift change report given to Rick Diggs RN (oncoming nurse) by Era Belcher (offgoing nurse). Report included the following information SBAR, Kardex, Intake/Output, MAR, Recent Results and Cardiac Rhythm NSR    Shift worked:  [id number]     Shift summary and any significant changes:     Palliative care consulted, echo performed, plan to continue to diurese with lasix to assist with pleural effusion and pericardial effusion, plan to resume chemotherapy if pt is able to improve functional status     Concerns for physician to address:  N/A     Zone phone for oncoming shift:           Activity:  Activity Level: Up with Assistance  Number times ambulated in hallways past shift: 0  Number of times OOB to chair past shift: 0    Cardiac:   Cardiac Monitoring: Yes      Cardiac Rhythm: Sinus Rhythm    Access:   Current line(s): PIV     Genitourinary:   Urinary status: voiding and external catheter    Respiratory:   O2 Device: None (Room air)  Chronic home O2 use?: NO  Incentive spirometer at bedside: YES     GI:  Last Bowel Movement Date: 07/12/21  Current diet:  ADULT DIET Regular  Passing flatus: YES  Tolerating current diet: YES       Pain Management:   Patient states pain is manageable on current regimen: YES    Skin:  Deshawn Score: 19  Interventions: increase time out of bed and PT/OT consult    Patient Safety:  Fall Score:  Total Score: 1  Interventions: bed/chair alarm, assistive device (walker, cane, etc), pt to call before getting OOB and stay with me (per policy)       Length of Stay:  Expected LOS: 3d 2h  Actual LOS: [address]

## 2021-08-16 NOTE — PROGRESS NOTES
Problem: Patient Education: Go to Patient Education Activity  Goal: Patient/Family Education  Outcome: Progressing Towards Goal     Problem: Pneumonia: Day 1  Goal: Off Pathway (Use only if patient is Off Pathway)  Outcome: Progressing Towards Goal  Goal: Activity/Safety  Outcome: Progressing Towards Goal  Goal: Consults, if ordered  Outcome: Progressing Towards Goal  Goal: Diagnostic Test/Procedures  Outcome: Progressing Towards Goal  Goal: Nutrition/Diet  Outcome: Progressing Towards Goal  Goal: Medications  Outcome: Progressing Towards Goal  Goal: Respiratory  Outcome: Progressing Towards Goal  Goal: Treatments/Interventions/Procedures  Outcome: Progressing Towards Goal  Goal: Psychosocial  Outcome: Progressing Towards Goal  Goal: *Oxygen saturation within defined limits  Outcome: Progressing Towards Goal  Goal: *Influenza vaccine administered (October-March)  Outcome: Progressing Towards Goal  Goal: *Pneumoccocal vaccine administered  Outcome: Progressing Towards Goal  Goal: *Hemodynamically stable  Outcome: Progressing Towards Goal  Goal: *Demonstrates progressive activity  Outcome: Progressing Towards Goal  Goal: *Tolerating diet  Outcome: Progressing Towards Goal     Problem: Pneumonia: Day 2  Goal: Off Pathway (Use only if patient is Off Pathway)  Outcome: Progressing Towards Goal  Goal: Activity/Safety  Outcome: Progressing Towards Goal  Goal: Consults, if ordered  Outcome: Progressing Towards Goal  Goal: Diagnostic Test/Procedures  Outcome: Progressing Towards Goal  Goal: Nutrition/Diet  Outcome: Progressing Towards Goal  Goal: Discharge Planning  Outcome: Progressing Towards Goal  Goal: Medications  Outcome: Progressing Towards Goal  Goal: Respiratory  Outcome: Progressing Towards Goal  Goal: Treatments/Interventions/Procedures  Outcome: Progressing Towards Goal  Goal: Psychosocial  Outcome: Progressing Towards Goal  Goal: *Oxygen saturation within defined limits  Outcome: Progressing Towards Goal  Goal: *Hemodynamically stable  Outcome: Progressing Towards Goal  Goal: *Demonstrates progressive activity  Outcome: Progressing Towards Goal  Goal: *Tolerating diet  Outcome: Progressing Towards Goal  Goal: *Optimal pain control at patient's stated goal  Outcome: Progressing Towards Goal     Problem: Pneumonia: Day 3  Goal: Off Pathway (Use only if patient is Off Pathway)  Outcome: Progressing Towards Goal  Goal: Activity/Safety  Outcome: Progressing Towards Goal  Goal: Consults, if ordered  Outcome: Progressing Towards Goal  Goal: Diagnostic Test/Procedures  Outcome: Progressing Towards Goal  Goal: Nutrition/Diet  Outcome: Progressing Towards Goal  Goal: Discharge Planning  Outcome: Progressing Towards Goal  Goal: Medications  Outcome: Progressing Towards Goal  Goal: Respiratory  Outcome: Progressing Towards Goal  Goal: Treatments/Interventions/Procedures  Outcome: Progressing Towards Goal  Goal: Psychosocial  Outcome: Progressing Towards Goal  Goal: *Oxygen saturation within defined limits  Outcome: Progressing Towards Goal  Goal: *Hemodynamically stable  Outcome: Progressing Towards Goal  Goal: *Demonstrates progressive activity  Outcome: Progressing Towards Goal  Goal: *Tolerating diet  Outcome: Progressing Towards Goal  Goal: *Describes available resources and support systems  Outcome: Progressing Towards Goal  Goal: *Optimal pain control at patient's stated goal  Outcome: Progressing Towards Goal     Problem: Pneumonia: Day 4  Goal: Off Pathway (Use only if patient is Off Pathway)  Outcome: Progressing Towards Goal  Goal: Activity/Safety  Outcome: Progressing Towards Goal  Goal: Nutrition/Diet  Outcome: Progressing Towards Goal  Goal: Discharge Planning  Outcome: Progressing Towards Goal  Goal: Medications  Outcome: Progressing Towards Goal  Goal: Respiratory  Outcome: Progressing Towards Goal  Goal: Treatments/Interventions/Procedures  Outcome: Progressing Towards Goal  Goal: Psychosocial  Outcome: Progressing Towards Goal     Problem: Pneumonia: Discharge Outcomes  Goal: *Demonstrates progressive activity  Outcome: Progressing Towards Goal  Goal: *Describes follow-up/return visits to physicians  Outcome: Progressing Towards Goal  Goal: *Tolerating diet  Outcome: Progressing Towards Goal  Goal: *Verbalizes name, dosage, time, side effects, and number of days to continue medications  Outcome: Progressing Towards Goal  Goal: *Influenza immunization  Outcome: Progressing Towards Goal  Goal: *Pneumococcal immunization  Outcome: Progressing Towards Goal  Goal: *Respiratory status at baseline  Outcome: Progressing Towards Goal  Goal: *Vital signs within defined limits  Outcome: Progressing Towards Goal  Goal: *Describes available resources and support systems  Outcome: Progressing Towards Goal  Goal: *Optimal pain control at patient's stated goal  Outcome: Progressing Towards Goal     Problem: Falls - Risk of  Goal: *Absence of Falls  Description: Document Harlan Pizano Fall Risk and appropriate interventions in the flowsheet.   Outcome: Progressing Towards Goal  Note: Fall Risk Interventions:            Medication Interventions: Assess postural VS orthostatic hypotension, Bed/chair exit alarm                   Problem: Patient Education: Go to Patient Education Activity  Goal: Patient/Family Education  Outcome: Progressing Towards Goal

## 2021-08-16 NOTE — PROGRESS NOTES
Problem: Pneumonia: Day 3  Goal: Off Pathway (Use only if patient is Off Pathway)  Outcome: Progressing Towards Goal  Goal: Activity/Safety  Outcome: Progressing Towards Goal  Goal: Consults, if ordered  Outcome: Progressing Towards Goal  Goal: Diagnostic Test/Procedures  Outcome: Progressing Towards Goal  Goal: Nutrition/Diet  Outcome: Progressing Towards Goal  Goal: Discharge Planning  Outcome: Progressing Towards Goal  Goal: Medications  Outcome: Progressing Towards Goal  Goal: Respiratory  Outcome: Progressing Towards Goal  Goal: Treatments/Interventions/Procedures  Outcome: Progressing Towards Goal  Goal: Psychosocial  Outcome: Progressing Towards Goal  Goal: *Oxygen saturation within defined limits  Outcome: Progressing Towards Goal  Goal: *Hemodynamically stable  Outcome: Progressing Towards Goal  Goal: *Demonstrates progressive activity  Outcome: Progressing Towards Goal  Goal: *Tolerating diet  Outcome: Progressing Towards Goal  Goal: *Describes available resources and support systems  Outcome: Progressing Towards Goal  Goal: *Optimal pain control at patient's stated goal  Outcome: Progressing Towards Goal     Problem: Pneumonia: Discharge Outcomes  Goal: *Demonstrates progressive activity  Outcome: Progressing Towards Goal  Goal: *Describes follow-up/return visits to physicians  Outcome: Progressing Towards Goal  Goal: *Tolerating diet  Outcome: Progressing Towards Goal  Goal: *Verbalizes name, dosage, time, side effects, and number of days to continue medications  Outcome: Progressing Towards Goal  Goal: *Influenza immunization  Outcome: Progressing Towards Goal  Goal: *Pneumococcal immunization  Outcome: Progressing Towards Goal  Goal: *Respiratory status at baseline  Outcome: Progressing Towards Goal  Goal: *Vital signs within defined limits  Outcome: Progressing Towards Goal  Goal: *Describes available resources and support systems  Outcome: Progressing Towards Goal  Goal: *Optimal pain control at patient's stated goal  Outcome: Progressing Towards Goal     Problem: Falls - Risk of  Goal: *Absence of Falls  Description: Document Consuelo Colindres Fall Risk and appropriate interventions in the flowsheet.   Outcome: Progressing Towards Goal  Note: Fall Risk Interventions:            Medication Interventions: Bed/chair exit alarm, Patient to call before getting OOB, Teach patient to arise slowly

## 2021-08-16 NOTE — PROGRESS NOTES
08/15/21 2046   Vital Signs   Temp 98.6 °F (37 °C)   Temp Source Oral   Pulse (Heart Rate) (!) 104   Cardiac Rhythm Sinus Tach   Resp Rate 23   O2 Sat (%) 94 %   Level of Consciousness Alert (0)   /78   MAP (Monitor) 85   MAP (Calculated) 85   MEWS Score 4   Pain 1   Pain Scale 1 Numeric (0 - 10)   Pain Intensity 1 0   Patient Stated Pain Goal 0   Oxygen Therapy   Pulse via Oximetry 105 beats per minute   charge aware of mews, vitals stable for pt.

## 2021-08-17 NOTE — PROGRESS NOTES
Problem: Patient Education: Go to Patient Education Activity  Goal: Patient/Family Education  Outcome: Progressing Towards Goal     Problem: Pneumonia: Day 1  Goal: Off Pathway (Use only if patient is Off Pathway)  Outcome: Progressing Towards Goal  Goal: Activity/Safety  Outcome: Progressing Towards Goal  Goal: Consults, if ordered  Outcome: Progressing Towards Goal  Goal: Diagnostic Test/Procedures  Outcome: Progressing Towards Goal  Goal: Nutrition/Diet  Outcome: Progressing Towards Goal  Goal: Medications  Outcome: Progressing Towards Goal  Goal: Respiratory  Outcome: Progressing Towards Goal  Goal: Treatments/Interventions/Procedures  Outcome: Progressing Towards Goal  Goal: Psychosocial  Outcome: Progressing Towards Goal  Goal: *Oxygen saturation within defined limits  Outcome: Progressing Towards Goal  Goal: *Influenza vaccine administered (October-March)  Outcome: Progressing Towards Goal  Goal: *Pneumoccocal vaccine administered  Outcome: Progressing Towards Goal  Goal: *Hemodynamically stable  Outcome: Progressing Towards Goal  Goal: *Demonstrates progressive activity  Outcome: Progressing Towards Goal  Goal: *Tolerating diet  Outcome: Progressing Towards Goal     Problem: Pneumonia: Day 2  Goal: Off Pathway (Use only if patient is Off Pathway)  Outcome: Progressing Towards Goal  Goal: Activity/Safety  Outcome: Progressing Towards Goal  Goal: Consults, if ordered  Outcome: Progressing Towards Goal  Goal: Diagnostic Test/Procedures  Outcome: Progressing Towards Goal  Goal: Nutrition/Diet  Outcome: Progressing Towards Goal  Goal: Discharge Planning  Outcome: Progressing Towards Goal  Goal: Medications  Outcome: Progressing Towards Goal  Goal: Respiratory  Outcome: Progressing Towards Goal  Goal: Treatments/Interventions/Procedures  Outcome: Progressing Towards Goal  Goal: Psychosocial  Outcome: Progressing Towards Goal  Goal: *Oxygen saturation within defined limits  Outcome: Progressing Towards Goal  Goal: *Hemodynamically stable  Outcome: Progressing Towards Goal  Goal: *Demonstrates progressive activity  Outcome: Progressing Towards Goal  Goal: *Tolerating diet  Outcome: Progressing Towards Goal  Goal: *Optimal pain control at patient's stated goal  Outcome: Progressing Towards Goal     Problem: Pneumonia: Day 3  Goal: Off Pathway (Use only if patient is Off Pathway)  Outcome: Progressing Towards Goal  Goal: Activity/Safety  Outcome: Progressing Towards Goal  Goal: Consults, if ordered  Outcome: Progressing Towards Goal  Goal: Diagnostic Test/Procedures  Outcome: Progressing Towards Goal  Goal: Nutrition/Diet  Outcome: Progressing Towards Goal  Goal: Discharge Planning  Outcome: Progressing Towards Goal  Goal: Medications  Outcome: Progressing Towards Goal  Goal: Respiratory  Outcome: Progressing Towards Goal  Goal: Treatments/Interventions/Procedures  Outcome: Progressing Towards Goal  Goal: Psychosocial  Outcome: Progressing Towards Goal  Goal: *Oxygen saturation within defined limits  Outcome: Progressing Towards Goal  Goal: *Hemodynamically stable  Outcome: Progressing Towards Goal  Goal: *Demonstrates progressive activity  Outcome: Progressing Towards Goal  Goal: *Tolerating diet  Outcome: Progressing Towards Goal  Goal: *Describes available resources and support systems  Outcome: Progressing Towards Goal  Goal: *Optimal pain control at patient's stated goal  Outcome: Progressing Towards Goal     Problem: Pneumonia: Day 4  Goal: Off Pathway (Use only if patient is Off Pathway)  Outcome: Progressing Towards Goal  Goal: Activity/Safety  Outcome: Progressing Towards Goal  Goal: Nutrition/Diet  Outcome: Progressing Towards Goal  Goal: Discharge Planning  Outcome: Progressing Towards Goal  Goal: Medications  Outcome: Progressing Towards Goal  Goal: Respiratory  Outcome: Progressing Towards Goal  Goal: Treatments/Interventions/Procedures  Outcome: Progressing Towards Goal  Goal: Psychosocial  Outcome: Progressing Towards Goal     Problem: Pneumonia: Discharge Outcomes  Goal: *Demonstrates progressive activity  Outcome: Progressing Towards Goal  Goal: *Describes follow-up/return visits to physicians  Outcome: Progressing Towards Goal  Goal: *Tolerating diet  Outcome: Progressing Towards Goal  Goal: *Verbalizes name, dosage, time, side effects, and number of days to continue medications  Outcome: Progressing Towards Goal  Goal: *Influenza immunization  Outcome: Progressing Towards Goal  Goal: *Pneumococcal immunization  Outcome: Progressing Towards Goal  Goal: *Respiratory status at baseline  Outcome: Progressing Towards Goal  Goal: *Vital signs within defined limits  Outcome: Progressing Towards Goal  Goal: *Describes available resources and support systems  Outcome: Progressing Towards Goal  Goal: *Optimal pain control at patient's stated goal  Outcome: Progressing Towards Goal     Problem: Falls - Risk of  Goal: *Absence of Falls  Description: Document Ellie Real Fall Risk and appropriate interventions in the flowsheet.   Outcome: Progressing Towards Goal  Note: Fall Risk Interventions:            Medication Interventions: Assess postural VS orthostatic hypotension, Bed/chair exit alarm                   Problem: Patient Education: Go to Patient Education Activity  Goal: Patient/Family Education  Outcome: Progressing Towards Goal

## 2021-08-17 NOTE — TELEPHONE ENCOUNTER
Resent over xeloda to [facility]. This pharmacy has a limite on what can be e-scrbed so had to change to abbreviattions. PO for by mouth and BID for twice daily.

## 2021-08-17 NOTE — PROGRESS NOTES
Hospitalist Progress Note    NAME: Scott Solares   :  1945   MRN:  [id number]       Assessment / Plan:  Healthcare associated pneumonia  Sepsis due to above  Leukocytosis, persistent  -CT shows evidence of bilateral patchy infiltrates. Rapid covid negative  C/w  empiric antibiotics with vancomycin and cefepime. She is on room air.  -Lactic acid is within normal limits  White blood cell count up to 12,000, clinically doing better, no fever  Repeat chest x-ray showed persistent bilateral pleural effusion, which are small  We will continue with IV antibiotics, repeat CBC tomorrow. If WBC count coming down, plan for discharge     Pericardial effusion r/o mets from gastric adeno ca  -CT shows evidence of moderate pericardial effusion that is slightly bigger when compared to previous imaging. She is currently not in tamponade and hemodynamically stable. Cardiology consulted  -Evaluated by cardiology on 2021 and felt that there was no need for pericardiocentesis at that time since no malignant cells were detected in pleural fluid. 2D echo on  showed moderate pericardial effusion and large left pleural effusion  Patient is clinically not short of breath and is on room air, continue with Lasix to help with the pleural effusion. If she becomes hypoxic or short of breath will consider thoracocentesis  Chest x-ray small left pleural effusion unchanged from chest x-ray from   Discussed with cardiology, no plan for pericardiocentesis as patient does not have tamponade  Mild hyperkalemia resolved  Mild hyponatremia  Chronic diastolic congestive heart failure  -Continue home Lasix.    -Hold home ACE inhibitor which is contributing to hyperkalemia  -Strict I's and O's, daily weights and low-salt diet     Diabetes mellitus type 2  Dyslipidemia  History of renal cell carcinoma s/p nephrectomy  Metastatic gastric adenocarcinoma with mets to liver  -hold home glipizide.   c/w insulin sliding scale blood sugar checks  -Cont lipitor  -consult oncology     CKD stage III  -Creatinine is close to baseline of around 1.3-1.5. Repeat BMP in a.m.     Incidental thyroid nodule  -not significantly changed per report from 2020. Arrange out pt f/u with PCP.     Anxiety     Code Status: full   Surrogate Decision Maker:      DVT Prophylaxis: Lovenox  GI Prophylaxis: not indicated     Baseline: From home independent   Estimated discharge: 8/17  Barriers: Clniical improvement. Subjective:     Chief Complaint / Reason for Physician Visit  F/u for Pneumonia  No acute complaints    Review of Systems:  Symptom Y/N Comments  Symptom Y/N Comments   Fever/Chills n   Chest Pain n    Poor Appetite n   Edema n    Cough    Abdominal Pain     Sputum    Joint Pain     SOB/NEWTON    Pruritis/Rash     Nausea/vomit    Tolerating PT/OT     Diarrhea    Tolerating Diet     Constipation    Other       Could NOT obtain due to:      Objective:     VITALS:   Last 24hrs VS reviewed since prior progress note. Most recent are:  Patient Vitals for the past 24 hrs:   Temp Pulse Resp BP SpO2   08/17/21 0800 98.3 °F (36.8 °C) 97 18 129/60 95 %   08/17/21 0311 99.5 °F (37.5 °C) (!) 110 19 119/60 93 %   08/16/21 2323 99.3 °F (37.4 °C) (!) 104 18 136/61 93 %   08/1945 98.4 °F (36.9 °C) 99 19 123/61 94 %   08/16/21 1600  98      08/16/21 1457 98.1 °F (36.7 °C) 98 18 131/73 96 %   08/16/21 1233 98.5 °F (36.9 °C) 94 21 124/70 95 %   08/16/21 1219  (!) 107 20  96 %   08/16/21 1212    (!) 129/57    08/16/21 1200  94      08/16/21 0926  (!) 104  (!) 129/57        Intake/Output Summary (Last 24 hours) at 8/17/2021 0831  Last data filed at 8/16/2021 1923  Gross per 24 hour   Intake 850 ml   Output 700 ml   Net 150 ml        I had a face to face encounter and independently examined this patient on 8/17/2021, as outlined below:  PHYSICAL EXAM:  General: WD, WN. Alert, cooperative, no acute distress    EENT:  EOMI. Anicteric sclerae. MMM  Resp:  CTA bilaterally, no wheezing or rales. No accessory muscle use  CV:  Regular  rhythm,  No edema  GI:  Soft, Non distended, Non tender. +Bowel sounds  Neurologic:  Alert and oriented X 3, normal speech,   Psych:   Good insight. Not anxious nor agitated  Skin:  No rashes. No jaundice    Reviewed most current lab test results and cultures  YES  Reviewed most current radiology test results   YES  Review and summation of old records today    NO  Reviewed patient's current orders and MAR    YES  PMH/SH reviewed - no change compared to H&P  ________________________________________________________________________  Care Plan discussed with:    Comments   Patient y    Family      RN y    Care Manager     Consultant                        Multidiciplinary team rounds were held today with , nursing, pharmacist and clinical coordinator. Patient's plan of care was discussed; medications were reviewed and discharge planning was addressed. ________________________________________________________________________  Total NON critical care TIME:  35   Minutes    Total CRITICAL CARE TIME Spent:   Minutes non procedure based      Comments   >50% of visit spent in counseling and coordination of care     ________________________________________________________________________  Leigh Tong MD     Procedures: see electronic medical records for all procedures/Xrays and details which were not copied into this note but were reviewed prior to creation of Plan. LABS:  I reviewed today's most current labs and imaging studies.   Pertinent labs include:  Recent Labs     08/16/21  0001 08/15/21  0306 08/14/21  1211   WBC 12.0* 10.9 12.6*   HGB 9.3* 9.3* 11.5   HCT 30.7* 30.2* 37.5    320 468*     Recent Labs     08/17/21  0324 08/16/21  0001 08/15/21  0306 08/14/21  1211 08/14/21  1211   * 129* 132*   < > 129*   K 4.3 4.6 4.5   < > 5.3*   CL 99 100 98   < > 95*   CO2 23 21 25   < > 27   * 113* 108*   < > 159*   BUN 26* 31* 33*   < > 33*   CREA 1.11* 1.41* 1.48*   < > 1.56*   CA 7.8* 7.7* 8.0*   < > 8.4*   ALB  --  1.5*  --   --  2.1*   TBILI  --  0.5  --   --  0.7   ALT  --  18  --   --  22    < > = values in this interval not displayed.        Signed: Vitor Jose MD

## 2021-08-17 NOTE — PROGRESS NOTES
Problem: Pneumonia: Day 3  Goal: Off Pathway (Use only if patient is Off Pathway)  Outcome: Progressing Towards Goal  Goal: Activity/Safety  Outcome: Progressing Towards Goal  Goal: Consults, if ordered  Outcome: Progressing Towards Goal  Goal: Diagnostic Test/Procedures  Outcome: Progressing Towards Goal  Goal: Nutrition/Diet  Outcome: Progressing Towards Goal  Goal: Discharge Planning  Outcome: Progressing Towards Goal  Goal: Medications  Outcome: Progressing Towards Goal  Goal: Respiratory  Outcome: Progressing Towards Goal  Goal: Treatments/Interventions/Procedures  Outcome: Progressing Towards Goal  Goal: Psychosocial  Outcome: Progressing Towards Goal  Goal: *Oxygen saturation within defined limits  Outcome: Progressing Towards Goal  Goal: *Hemodynamically stable  Outcome: Progressing Towards Goal  Goal: *Demonstrates progressive activity  Outcome: Progressing Towards Goal  Goal: *Tolerating diet  Outcome: Progressing Towards Goal  Goal: *Describes available resources and support systems  Outcome: Progressing Towards Goal  Goal: *Optimal pain control at patient's stated goal  Outcome: Progressing Towards Goal     Problem: Pneumonia: Discharge Outcomes  Goal: *Demonstrates progressive activity  Outcome: Progressing Towards Goal  Goal: *Describes follow-up/return visits to physicians  Outcome: Progressing Towards Goal  Goal: *Tolerating diet  Outcome: Progressing Towards Goal  Goal: *Verbalizes name, dosage, time, side effects, and number of days to continue medications  Outcome: Progressing Towards Goal  Goal: *Influenza immunization  Outcome: Progressing Towards Goal  Goal: *Pneumococcal immunization  Outcome: Progressing Towards Goal  Goal: *Respiratory status at baseline  Outcome: Progressing Towards Goal  Goal: *Vital signs within defined limits  Outcome: Progressing Towards Goal  Goal: *Describes available resources and support systems  Outcome: Progressing Towards Goal  Goal: *Optimal pain control at patient's stated goal  Outcome: Progressing Towards Goal     Problem: Falls - Risk of  Goal: *Absence of Falls  Description: Document Candice Parker Fall Risk and appropriate interventions in the flowsheet.   Outcome: Progressing Towards Goal  Note: Fall Risk Interventions:            Medication Interventions: Bed/chair exit alarm, Patient to call before getting OOB, Teach patient to arise slowly

## 2021-08-17 NOTE — PROGRESS NOTES
Transition of Care Plan:    RUR: 30%  Disposition: Home with Follow-up Appointments  Follow up appointments: PCP; Oncology   DME needed: None  Transportation at Discharge:  Pt's  Amparo Knott will transport at d/c.   Leila Vieira or means to access home:   Access to the home. IM Medicare Letter: 2nd IM Letter needed  Is patient a BCPI-A Bundle:   N/A       If yes, was Bundle Letter given?:  N/A  Caregiver Contact: Ed Meeter- [phone]  Discharge Caregiver contacted prior to discharge? CM will  Notify caregiver at d/c.    Reason for Admission:   HCAP (Healthcare associated pneumonia)                    RUR Score:   30 Moderate risk at d/c. PCP: First and Last name:   Thalia Rebolledo MD     Name of Practice:    Are you a current patient: Yes/No: Yes   Approximate date of last visit: Week ago   Can you participate in a virtual visit if needed: No    Do you (patient/family) have any concerns for transition/discharge? None              Plan for utilizing home health: Pt will not be utilizing home health at d/c. Current Advanced Directive/Advance Care Plan:  Full Code; Pt has an ACP on file. Advance Care Planning     General Advance Care Planning (ACP) Conversation      Date of Conversation: 8/17/2021  Conducted with: Patient with Decision Making Capacity    Healthcare Decision Maker:     Primary Decision Maker (Active): Fritz Norman - Spouse - [phone]    Content/Action Overview: Has ACP document(s) on file - reflects the patient's care preferences  Reviewed DNR/DNI and patient elects Full Code (Attempt Resuscitation)  Length of Voluntary ACP Conversation in minutes:  16 minutes            CM met with pt at bedside to discuss d/c plan. Pt was alert and oriented. CM verified pt's demographics, insurance and PCP. Pt is a 67 y/o  female admitted to [address] [place] [address] on 8/14/2021 for HCAP (Healthcare associated pneumonia). Pt's PCP is Dr. Enrique Hancock.  Pt uses [facility] on [address] for Rx. Pt resides in an one level home with 0 JUAN C. Pt is independent with ADL's and IADL's. Pt does drive. No DME's. No HH, SNF or IPR in the past. Pt is FULL code status. Pt does have an ACP on file. Pt's  Mathew Francis will transport at d/c. Care Management Interventions  PCP Verified by CM: Yes (Pt's PCP is Dr. Keke Thomas.)  Palliative Care Criteria Met (RRAT>21 & CHF Dx)?: No  Mode of Transport at Discharge: Other (see comment) (Pt's  Mathew Francis will transport at d/c.)  Transition of Care Consult (CM Consult): Discharge Planning (Home with Follow-up Appointments)  Discharge Durable Medical Equipment: No (No DME's)  Physical Therapy Consult: No  Occupational Therapy Consult: No  Speech Therapy Consult: No  Current Support Network: Lives with Spouse, Own Home ( Pt resides in an one level home with 0 JUAN C.)  Confirm Follow Up Transport: Self (Pt does drive)  [address] Provided?: No  Discharge Location  Discharge Placement:  (Home with Follow-up Appointments)    CM will continue to follow patient for discharge planning needs and arrange for services as deemed necessary.     Ephraim Castro [address] [place] [address]  [phone]

## 2021-08-17 NOTE — PROGRESS NOTES
0700 Bedside shift change report given to Zora Reveles RN and Richard Lowery RN (oncoming nurse) by Chadd Koroma RN (offgoing nurse). Report included the following information SBAR, Kardex, Intake/Output, MAR, Recent Results and Cardiac Rhythm NSR.    0915 Spoke with Dr. Myra Byrd regarding plan of care. Plan to repeat CXR today to assess status of left pleural effusion. 1410 CBC drawn and sent to lab for processing. 1900 End of Shift Note    Bedside shift change report given to Melina Palmer RN (oncoming nurse) by Aleja Rogers (offgoing nurse). Report included the following information SBAR, Kardex, Intake/Output, MAR, Recent Results and Cardiac Rhythm NSR    Shift worked:  8267-2144     Shift summary and any significant changes:     Repeat CXR today, WBC slightly up from previous CBC     Concerns for physician to address:  PT/OT consult, discharge planning     Zone phone for oncoming shift:           Activity:  Activity Level: Up with Assistance  Number times ambulated in hallways past shift: 1  Number of times OOB to chair past shift: 1    Cardiac:   Cardiac Monitoring: Yes      Cardiac Rhythm: Sinus Tach    Access:   Current line(s): PIV     Genitourinary:   Urinary status: voiding and external catheter    Respiratory:   O2 Device: None (Room air)  Chronic home O2 use?: NO  Incentive spirometer at bedside: YES     GI:  Last Bowel Movement Date: 08/12/21  Current diet:  ADULT DIET Regular  ADULT ORAL NUTRITION SUPPLEMENT Breakfast, Lunch, Dinner, HS Snack; Diabetic Supplement  Passing flatus: YES  Tolerating current diet: YES       Pain Management:   Patient states pain is manageable on current regimen: YES    Skin:  Deshawn Score: 19  Interventions: increase time out of bed and PT/OT consult    Patient Safety:  Fall Score:  Total Score: 1  Interventions: bed/chair alarm, assistive device (walker, cane, etc) and pt to call before getting OOB       Length of Stay:  Expected LOS: 3d 2h  Actual LOS: [address] [place]in Montiel

## 2021-08-17 NOTE — ACP (ADVANCE CARE PLANNING)
Primary Decision Maker (Active): Fritz Norman - Spouse - [phone]    Secondary Decision Maker: Jose Morales - [phone]  Advance Care Planning 8/17/2021   Patient's Healthcare Decision Maker is: Named in scanned ACP document   Confirm Advance Directive Yes, on file   Patient Would Like to Complete Advance Directive -   Does the patient have other document types Do Not Resuscitate     Patient met earlier in the day with Dr Cuong Velarde and wanted to update her AMD, to reflect current health care wishes. This writer met with patient to discuss and update AMD. Patient's  was present earlier in the day for conversation regarding AMD with Dr Cuong Velarde. He had just left to go home. Patient is a pleasant, cognitively alert, friendly woman who willingly shared her healthcare wishes and reiterated the following information:    1. Patient names her  of 35 years, Herbert Self and her friend Jesus Chu as mPOAs, primary and secondary. 2. Patient does not want life prolonging measures at end of life, if she is imminently dying or is unaware of her surroundings, unable to interact with others. 3. Patient is not an organ donor. Patient also wishes to not have CPR at end of life, she wishes for DNR, DDNR signed today. This discussion was also with Dr Cuong Velarde and confirmed again by this writer at time of this visit. Original and copy of both documents given to patient. A copy was placed in hard chart for scanning.

## 2021-08-17 NOTE — PROGRESS NOTES
[address] [facility] [address], 210 [facility], 45 [facility], [address] [place] [address]  [phone]    PROGRESS NOTE   Date of Visit: 08/17/21  Reason for Visit:     Chief Complaint   Patient presents with    Shortness of Breath     pt was recently dx with stomach cancer w/mets to liver. She is having sob since last night. She says that she has had this before and has had to have fluid drained. Subjective:   Celestino Serrato is a 68 y.o. 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 Also, with a history of renal cancer. . Patient reports that she is dealing with shortness of breath. She tells me that she seemed to feel that something was off for the past few days.  corroborates that she has been able to maintain most activities of daily living but more recently has had some difficulties. She denies any headaches or acute vision changes. She denies any severe uncontrolled pain. She notes that she is feeling slightly better today. However, she also has been found to have pericardial effusion. She reportedly is set to start on 8/17/2021 Xeloda, oxaliplatin,pembrolizumab, and Herceptin. She reports adequate oral intake of food and hydration. Patient denies any bowel or bladder problems. Interval History: Patient resting comfortably with her  at the bedside. Breathing continues to improve. Patient has not been walking much. Reinforced that in order for her to receive treatment, she needs to increase mobilization and improve nutrition.        Past Medical History:   Diagnosis Date    Chronic kidney disease     Diabetes ([facility] [address]     Type II    History of kidney cancer 06/2020    stage 2, s/p nephrectomy, no further tx necessary    Hypercholesterolemia     Hypertension     Pleural effusion, left 6/23/2021    Psychiatric disorder     anxiety Past Surgical History:   Procedure Laterality Date    COLONOSCOPY N/A 12/10/2019    COLONOSCOPY performed by Jade Vizcarra [facility] at [facility] ENDOSCOPY    HX CATARACT REMOVAL Bilateral 2015    HX CATARACT REMOVAL  01/09/2018    secondary cataracts removed    HX CHOLECYSTECTOMY  1993    HX DILATION AND CURETTAGE  2000    HX HYSTERECTOMY  2000    complete    HX NEPHRECTOMY Right 2020    stage 2 cancer, no further tx needed    IR INSERT TUNL CVC W PORT OVER 5 YEARS  7/26/2021      Social History     Tobacco Use    Smoking status: Never Smoker    Smokeless tobacco: Never Used   Substance Use Topics    Alcohol use: No     Alcohol/week: 0.0 standard drinks      Family History   Adopted: Yes   Problem Relation Age of Onset    Cancer Daughter         lung    Cancer Son         colon     Current Facility-Administered Medications   Medication Dose Route Frequency    cefepime (MAXIPIME) 2 g in 0.9% sodium chloride (MBP/ADV) 100 mL MBP  2 g IntraVENous Q12H    [START ON 8/18/2021] senna-docusate (PERICOLACE) 8.6-50 mg per tablet 2 Tablet  2 Tablet Oral DAILY    acetaminophen (TYLENOL) tablet 650 mg  650 mg Oral Q6H PRN    ondansetron (ZOFRAN) injection 4 mg  4 mg IntraVENous Q4H PRN    amitriptyline (ELAVIL) tablet 10 mg  10 mg Oral QHS    furosemide (LASIX) tablet 40 mg  40 mg Oral DAILY    atorvastatin (LIPITOR) tablet 20 mg  20 mg Oral DAILY    sodium chloride (NS) flush 5-40 mL  5-40 mL IntraVENous Q8H    sodium chloride (NS) flush 5-40 mL  5-40 mL IntraVENous PRN    polyethylene glycol (MIRALAX) packet 17 g  17 g Oral DAILY PRN    enoxaparin (LOVENOX) injection 30 mg  30 mg SubCUTAneous DAILY    vancomycin (VANCOCIN) 750 mg in 0.9% sodium chloride 250 mL (VIAL-MATE)  750 mg IntraVENous Q24H    insulin lispro (HUMALOG) injection   SubCUTAneous AC&HS    glucose chewable tablet 16 g  4 Tablet Oral PRN    dextrose (D50W) injection syrg 12.5-25 g  12.5-25 g IntraVENous PRN    glucagon (GLUCAGEN) injection 1 mg  1 mg IntraMUSCular PRN      No Known Allergies   Review of Systems provided by: patient  General: denies fever and reports fatigue  HEENT: denies epistaxis and denies trouble swallowing  Eyes: denies any vision loss and denies any eye pain  Cardio: denies any chest pain and denies any leg swelling  Resp: denies any hemoptysis, reports shortness of breath that is improved since admission, reports cough that is improving. Abdomen: denies any abdominal pain, denies any nausea, denies any vomiting and denies any diarrhea Patient denies any blood loss. , Patient denies any hematemesis. , Patient denies any hematochezia., Patient denies any melena. and Patient denies any rectal bleeding. Skin: denies any rash and denies any itching  MSK: denies any myalgias and denies any arthralgias  Neuro: denies any headache and denies any seizure history  Psych: denies depression and denies anxiety        Objective:     Visit Vitals  /66 (BP 1 Location: Right upper arm, BP Patient Position: At rest)   Pulse (!) 103   Temp 98 °F (36.7 °C)   Resp 17   Ht 5' 5\" (1.651 m)   Wt 144 lb 3.2 oz (65.4 kg)   LMP  (LMP Unknown)   SpO2 94%   BMI 24.00 kg/m²     ECOG PS: 2- Ambulatory and capable of all selfcare but unable to carry out any work activities; up and about more than 50% of waking hours. Physical Exam  Constitutional:  No acute distress. Non-toxic appearance. Non-diaphoretic. Appears tired. HENT: Normocephalic and atraumatic head. Mouth/Throat: Oropharynx is clear. No oropharyngeal exudate. No oral mucositis. Eyes: No scleral icterus. Conjunctivae normal.   Cardiovascular: Heart sounds: Normal heart sounds. No friction rub. No gallop. No pitting/trace edema. Pulmonary: Pulmonary effort is normal. No respiratory distress. Breath sounds: No wheezing, rhonchi or rales. Abdominal: General: Bowel sounds are normal. Palpations: Abdomen is soft. Tenderness: There is no abdominal tenderness. No guarding.   Skin: Skin is not jaundiced. No rash. No petechiae. Musculoskeletal: No muscle pain on palpation. No temporal muscle wasting on inspection. Neurological: Alert and oriented to person, place, and time. Mental status is at baseline. Psychiatric: mood normal. normal speech rate and normal affect    Results:     Lab Results   Component Value Date/Time    WBC 12.6 (H) 08/17/2021 02:08 PM    HGB 9.4 (L) 08/17/2021 02:08 PM    HCT 30.6 (L) 08/17/2021 02:08 PM    PLATELET 064 (H) 15/99/8842 02:08 PM    MCV 86.4 08/17/2021 02:08 PM    ABS. NEUTROPHILS 10.0 (H) 08/17/2021 02:08 PM     Lab Results   Component Value Date/Time    Sodium 129 (L) 08/17/2021 03:24 AM    Potassium 4.3 08/17/2021 03:24 AM    Chloride 99 08/17/2021 03:24 AM    CO2 23 08/17/2021 03:24 AM    Glucose 114 (H) 08/17/2021 03:24 AM    BUN 26 (H) 08/17/2021 03:24 AM    Creatinine 1.11 (H) 08/17/2021 03:24 AM    GFR est AA 58 (L) 08/17/2021 03:24 AM    GFR est non-AA 48 (L) 08/17/2021 03:24 AM    Calcium 7.8 (L) 08/17/2021 03:24 AM    Sodium,  (L) 08/14/2021 03:39 PM    Potassium, POC 5.1 08/14/2021 03:39 PM    Chloride, POC 92 (L) 08/14/2021 03:39 PM    Glucose (POC) 114 08/17/2021 12:00 PM    Creatinine, POC 1.8 (H) 08/14/2021 03:39 PM    Calcium, ionized (POC) 1.12 08/14/2021 03:39 PM     Lab Results   Component Value Date/Time    Bilirubin, total 0.5 08/16/2021 12:01 AM    ALT (SGPT) 18 08/16/2021 12:01 AM    Alk. phosphatase 233 (H) 08/16/2021 12:01 AM    Protein, total 5.7 (L) 08/16/2021 12:01 AM    Albumin 1.5 (L) 08/16/2021 12:01 AM    Globulin 4.2 (H) 08/16/2021 12:01 AM       CT CHEST WO CONT    Result Date: 8/14/2021  INDICATION: History of lung cancer and pleural effusion requiring drainage. Evaluate for infiltrates or pericardial effusion. COMPARISON: CT ABD 6/23/2021. CT thorax 6/22/2021. CT thorax 6/10/2020. CONTRAST: None. TECHNIQUE:  5 mm axial images were obtained through the thorax. Coronal and sagittal reformats were generated.   CT dose reduction was achieved through use of a standardized protocol tailored for this examination and automatic exposure control for dose modulation. The absence of intravenous contrast reduces the sensitivity for evaluation of the mediastinum, harry, vasculature, and upper abdominal organs. FINDINGS: CHEST WALL: No mass or axillary lymphadenopathy. Right Port-A-Cath in place with catheter traversing expected course to the region of the atriocaval junction. THYROID: Heterogeneous thyroid enlargement redemonstrated with probable dominant nodule in the left gland measuring up to 2.9 x 3.7 cm, not significant change since 2020. MEDIASTINUM: No mass or lymphadenopathy. HARRY: No mass or lymphadenopathy. THORACIC AORTA: Atherosclerotic calcification without aneurysm. MAIN PULMONARY ARTERY: Normal in caliber. TRACHEA/BRONCHI: Patent. ESOPHAGUS: No wall thickening or dilatation. HEART: Moderate pleural effusion. Heart is normal in size. PLEURA: Left greater than right small pleural effusions. No pneumothorax. LUNGS: Patchy airspace infiltrates in the posterior left upper lobe and lingula. Atelectasis left lower lobe overlying effusion. No mass or nodule. INCIDENTALLY IMAGED UPPER ABDOMEN: Numerous hepatic metastases, increased in number and size from prior abdominal CT with largest lesions measuring 1.9 x 2.2 cm and 1.9 x 2.0 cm in the right lobe and 1.5 x 1.6 cm and left lobe. BONES: Degenerative spine change. No acute fracture or aggressive lesion. 1. Moderate pericardial effusion. 2. Bilateral patchy areas of pulmonary infiltrates concerning for pneumonia in appropriate clinical setting. 3. Small left greater than right pleural effusions. 4. Progressive hepatic metastases. XR CHEST PORT    Result Date: 6/22/2021  EXAM: XR CHEST PORT INDICATION: post thoracentesis  COMPARISON: 1001 hours FINDINGS: A portable AP radiograph of the chest was obtained at 1523 hours. The patient is on a cardiac monitor.  There is no pneumothorax. The left pleural effusion is much smaller. . The cardiac and mediastinal contours and pulmonary vascularity are normal.  The bones and soft tissues are grossly within normal limits. No pneumothorax following left-sided thoracentesis      I personally reviewed pertinent labs/results:   Lab Results   Component Value Date/Time    WBC 12.6 (H) 08/17/2021 02:08 PM    HGB 9.4 (L) 08/17/2021 02:08 PM    HCT 30.6 (L) 08/17/2021 02:08 PM    PLATELET 122 (H) 83/95/7897 02:08 PM    MCV 86.4 08/17/2021 02:08 PM     Lab Results   Component Value Date/Time    Sodium 129 (L) 08/17/2021 03:24 AM    Potassium 4.3 08/17/2021 03:24 AM    Chloride 99 08/17/2021 03:24 AM    CO2 23 08/17/2021 03:24 AM    Anion gap 7 08/17/2021 03:24 AM    Glucose 114 (H) 08/17/2021 03:24 AM    BUN 26 (H) 08/17/2021 03:24 AM    Creatinine 1.11 (H) 08/17/2021 03:24 AM    BUN/Creatinine ratio 23 (H) 08/17/2021 03:24 AM    GFR est AA 58 (L) 08/17/2021 03:24 AM    GFR est non-AA 48 (L) 08/17/2021 03:24 AM    Calcium 7.8 (L) 08/17/2021 03:24 AM    Bilirubin, total 0.5 08/16/2021 12:01 AM    Alk. phosphatase 233 (H) 08/16/2021 12:01 AM    Protein, total 5.7 (L) 08/16/2021 12:01 AM    Albumin 1.5 (L) 08/16/2021 12:01 AM    Globulin 4.2 (H) 08/16/2021 12:01 AM    A-G Ratio 0.4 (L) 08/16/2021 12:01 AM    ALT (SGPT) 18 08/16/2021 12:01 AM    AST (SGOT) 29 08/16/2021 12:01 AM       I personally reviewed pertinent referral notes:   Present reviewed hospitalist history and physical examination note. Patient being treated for healthcare associated pneumonia with empiric antibiotics and Vanco and cefepime. Also, cardiology consulted for pericardial effusion. Reportedly this has been pre-existing but has increased in size; primary team consulting cardiology. Assessment and Recommendations:     Metastasis from gastric cancer ([facility] [address]   Will delay treatment until next week to allow patient time to recover from acute events.    Discussed continued improvement in performance status for patient to be considered a good candidate for treatment.  PT/OT to work with patient.  Nutritional supplements ordered. High risk medication use   Echocardiogram today shows normal left ventricular function with an estimated LVEF of 60-65%. Therefore, plan to proceed with anti-HER-2 targeted therapy as scheduled. Normocytic anemia   Continue to monitor. Pericardial effusion   Cardiology being consulted. Unclear if related to acute illness. Unclear how quickly effusion has progressed. [facility] with no plan for pericardial tap. Patient is clinically improved.  Continue lasix. HCAP (healthcare-associated pneumonia)   On Vanco,Cefepime. Continue to follow. Patient non-toxic in appearance.  Continue antibiotics. Pleural effusion   - Small, left sided. Follow-Up:  · Delay treatment with CapOx, pembrolizumab, and trastuzumab until next week. Tentatively plan for treatment on 8/24/21 at 8:00am. Consider dose reduction of cytotoxic chemotherapy. · Patient requires PT/OT to have improvement in functional status. This would be required to become a candidate for chemotherapy. Currently planning for chemotherapy as an outpatient in about 1 week. · Agree with code status change to DNR, appreciate palliative care input. Signed By:   Shelvy Najjar, NP     I have personally seen and evaluated the patient in conjunction with Johnson Shah NP. I find the patient's history and physical exam are consistent with the NP's documentation. I agree with the above assessment and plan, which I have modified as needed. I have placed an addendum to the the above note, and edited it to become accurate. Patient needs functional improvement prior to consideration of starting chemotherapy.   Needs PT/OT

## 2021-08-17 NOTE — PROGRESS NOTES
Physician Progress Note      PATIENT:               Rad Carreon  CSN #:                  [id number]  :                       1945  ADMIT DATE:       2021 11:48 AM  DISCH DATE:  RESPONDING  PROVIDER #:        Abdulaziz Taylor MD          QUERY TEXT:    Patient admitted with Pneumonia. Noted documentation of sepsis in H&P 21. In order to support the diagnosis of sepsis, please include additional clinical indicators in your documentation. Or please document if the diagnosis of sepsis has been ruled out after further study. The medical record reflects the following:  Risk Factors: 67 y/o. female PMHx of chronic pericardial effusion, liver metastasis, congestive heart failure. on admit found to have tachycardia, elevated wbc of 12.6,    Clinical Indicators:  H&P Healthcare associated pneumonia Sepsis due to above  Labs  2021 12:11  WBC: 12.6 (H)    2021 15:39  Lactic Acid (POC): 1.04    Vitals   1049 T 98.3  RR 20 /60 97% RA  1900 T 99.3  RR 18 /59 96% RA  8/15 0333  RR 21    Medications  2021 1606 vancomycin (VANCOCIN) 1250 mg in  ml infusion  2021 1528 piperacillin-tazobactam (ZOSYN) 3.375 g in 0.9% sodium chloride (MBP/ADV) 100 mL MBP  2021 2256 cefepime (MAXIPIME) 2 g in 0.9% sodium chloride (MBP/ADV) 100 mL MBP  2021 1821 furosemide (LASIX) injection 40 mg    Treatment: admit, hospitalist consult, Labs,BMP lactic acid, ABX Vancomycin, vitals per unit routine, cardiac monitoring  Options provided:  -- Sepsis present as evidenced by, Please document evidence.   -- Sepsis was ruled out after study  -- Other - I will add my own diagnosis  -- Disagree - Not applicable / Not valid  -- Disagree - Clinically unable to determine / Unknown  -- Refer to Clinical Documentation Reviewer    PROVIDER RESPONSE TEXT:    Sepsis is present as evidenced by  RR 20 wbc 12.6    Query created by: Kianna Ricardo on 2021 1:45 PM      QUERY TEXT:    Pt admitted with Healthcare associated pneumonia. If possible, please document in the progress notes and discharge summary if you are evaluating and/or treating any of the following:    Note: CAP and HCAP indicate where the pneumonia was acquired, not a specific type. The medical record reflects the following:  Risk Factors: 67 y/o. female PMHx of chronic pericardial effusion, liver metastasis, congestive heart failure. on admit found to have tachycardia, elevated wbc of 12.6,    Clinical Indicators: 8/14 H&P Healthcare associated pneumonia Sepsis due to above  Labs  8/14/2021 12:11  WBC: 12.6 (H)    8/14/2021 15:39  Lactic Acid (POC): 1.04    Vitals  8/14 1049 T 98.3  RR 20 /60 97% RA  1900 T 99.3  RR 18 /59 96% RA  8/15 0333  RR 21    Medications  08/14/2021 1606 vancomycin (VANCOCIN) 1250 mg in  ml infusion  08/14/2021 1528 piperacillin-tazobactam (ZOSYN) 3.375 g in 0.9% sodium chloride (MBP/ADV) 100 mL MBP  08/14/2021 2256 cefepime (MAXIPIME) 2 g in 0.9% sodium chloride (MBP/ADV) 100 mL MBP  08/14/2021 1821 furosemide (LASIX) injection 40 mg    Treatment: admit, hospitalist consult, Labs,BMP lactic acid, ABX Vancomycin, vitals per unit routine, cardiac monitoring  Options provided:  -- Gram negative pneumonia  -- Gram positive pneumonia  -- MRSA pneumonia  -- Bacterial pneumonia  -- Viral pneumonia  -- Other - I will add my own diagnosis  -- Disagree - Not applicable / Not valid  -- Disagree - Clinically unable to determine / Unknown  -- Refer to Clinical Documentation Reviewer    PROVIDER RESPONSE TEXT:    This patient has bacterial pneumonia.     Query created by: Shahrzad Neves on 8/16/2021 1:52 PM      Electronically signed by:  Matty Jensen MD 8/17/2021 11:09 AM

## 2021-08-18 NOTE — DISCHARGE INSTRUCTIONS
Patient Discharge Instructions    Benji Robert / [id number] : 1945    Admitted 2021 Discharged: 2021         DISCHARGE DIAGNOSIS:     Healthcare associated pneumonia  Sepsis due to above  Leukocytosis, persistent  Pericardial effusion persistent   Mild hyperkalemia resolved  Mild hyponatremia  Chronic diastolic congestive heart failure  Diabetes mellitus type 2  Dyslipidemia  History of renal cell carcinoma s/p nephrectomy  Metastatic gastric adenocarcinoma with mets to liver  CKD stage III  Incidental thyroid nodule  Anxiety       Take Home Medications     {Medication reconciliation information is now added to the patient's AVS automatically when it is printed. There is no need to use this SmartLink in discharge instructions. Highlight this text and delete it to clear this message}      General drug facts      If you have a very bad allergy, wear an allergy ID at all times.  It is important that you take the medication exactly as they are prescribed.  Keep your medication in the bottles provided by the pharmacist.  Gary Gordillo a list of all your drugs (prescription, natural products, vitamins, OTC) with you. Give this list to your doctor.  Do not take other medications without consulting your doctor.   Do not share your drugs with others and do not take anyone else's drugs.  Keep all drugs out of the reach of children and pets.   Most drugs may be thrown away in household trash after mixing with coffee grounds or veronica litter and sealing in a plastic bag.   Keep a list Call your doctor for help with any side effects. If in the [place] you may also call the FDA at 4-046-FDA-2605     Talk with the doctor before starting any new drug, including OTC, natural products, or vitamins. What to do at Home    1. Recommended diet: Diabetic    2. Recommended activity: Activity as tolerated    3.  If you experience any of the following symptoms then please call your primary care physician or return to the emergency room if you cannot get hold of your doctor:    4. Wound Care: None    5. Lab work: Cbc and Bmp in 1 week     6. Follow up with your PCP and Dr Srinivas Brandt  as below for thyroid nodule     7. Bring these papers with you to your follow up appointments. The papers will help your doctors be sure to continue the care plan from the hospital.      If you have questions regarding the hospital related prescriptions or hospital related issues please call [facility] at [phone]. You can always direct your questions to your primary care doctor if you are unable to reach your hospital physician; your PCP works as an extension of your hospital doctor just like your hospital doctor is an extension of your PCP for your time at the hospital [facility], [facility])      Follow-up with:   PCP: Neelam Martinez MD  Follow-up Information     Follow up With Specialties Details Why Juan C Jose MD Endocrinology Go on 3/11/2022 at 9:50am. Office will call if an earlier appointment becomes available. Rob [address] [place] [address]      Neelam Martinez MD Hematology and Oncology, Internal Medicine, Hematology, Oncology Go on 8/24/2021 8:00am with the [facility] center; 8:30am with Dr. Michael Traylor [address] [address]  [address] [facility], [facility]   [address] [facility]  [address] [id number]      Eugenio Simmonds, MD  Go on 9/1/2021 Endocrinologist follow-up appointment at 11:20am [facility], [address] [facility] [address]  [facility], [address] [facility]  [phone]           Please call for your own appointment        Information obtained by :  I understand that if any problems occur once I am at home I am to contact my physician. I understand and acknowledge receipt of the instructions indicated above. Physician's or R.N.'s Signature                                                                  Date/Time                                                                                                                                              Patient or Representative Signature                                                          Date/Time

## 2021-08-18 NOTE — PROGRESS NOTES
Problem: Self Care Deficits Care Plan (Adult)  Goal: *Acute Goals and Plan of Care (Insert Text)  Description:   FUNCTIONAL STATUS PRIOR TO ADMISSION: Patient was independent without use of DME. She reports decline in activity lately due to illness/fatigue. HOME SUPPORT: The patient lived with her  who is able to provide assistance. Occupational Therapy Goals  Initiated 8/18/2021  1. Patient will perform grooming with modified independence standing >3 minutes within 7 day(s). 2.  Patient will perform lower body dressing with modified independence within 7 day(s). 3.  Patient will perform toilet transfers with modified independence within 7 day(s). 4.  Patient will perform all aspects of toileting with modified independence within 7 day(s). 5.  Patient will participate in upper extremity therapeutic exercise/activities with independence for 5 minutes within 7 day(s). 6.  Patient will utilize energy conservation techniques during functional activities with verbal cues within 7 day(s). 8/18/2021 1243 by Darrin Ferrara OT  Outcome: Progressing Towards Goal  OCCUPATIONAL THERAPY EVALUATION  Patient: Jovita Tena (75 y.o. female)  Date: 8/18/2021  Primary Diagnosis: HCAP (healthcare-associated pneumonia) [J18.9]  Pericardial effusion [I31.3]        Precautions:  Fall, DNR    ASSESSMENT  Based on the objective data described below, the patient presents with mildly decreased balance, endurance and strength following admission for HCAP and pericardial effusion. At baseline pt lives with her  and is I with ADLs and mobility, although reports decline in activity recently. She was received semisupine in bed, agreeable to participate. Transferred supine>sit independently, good sitting balance while donning socks with setup. She ambulated in room, performed toilet transfer and brief standing grooming ADL with SBA-CGA.  Demo'd instance of minor LOB that pt self-corrected, she reports feeling mildly unsteady due to decreased mobility over the last few days. HR up to 133 bpm with activity, recovered into 100s bpm with seated rest. Transferred to chair at end of session with needs met, VSS on RA. Pt states \"once she get moving again\" and back to daily routine she will progress to PLOF, and has good support from  at home. Current Level of Function Impacting Discharge (ADLs/self-care): SBA-CGA transfers, independent-CGA ADLs    Functional Outcome Measure: The patient scored 70/100 on the Barthel Index outcome measure. Other factors to consider for discharge: none additional     Patient will benefit from skilled therapy intervention to address the above noted impairments. PLAN :  Recommendations and Planned Interventions: self care training, functional mobility training, therapeutic exercise, balance training, therapeutic activities, endurance activities, patient education, home safety training, and family training/education    Frequency/Duration: Patient will be followed by occupational therapy 3 times a week to address goals. Recommendation for discharge: (in order for the patient to meet his/her long term goals)  HH therapy vs. None- pt declined HH therapy at this time    This discharge recommendation:  Has been made in collaboration with the attending provider and/or case management    IF patient discharges home will need the following DME: patient owns DME required for discharge       SUBJECTIVE:   Patient stated I think once I get home and moving again I will be fine.     OBJECTIVE DATA SUMMARY:   HISTORY:   Past Medical History:   Diagnosis Date    Chronic kidney disease     Diabetes ([facility] [address]     Type II    History of kidney cancer 06/2020    stage 2, s/p nephrectomy, no further tx necessary    Hypercholesterolemia     Hypertension     Pleural effusion, left 6/23/2021    Psychiatric disorder     anxiety     Past Surgical History:   Procedure Laterality Date    COLONOSCOPY N/A 12/10/2019    COLONOSCOPY performed by Fazal Salinas MD at [facility] ENDOSCOPY    HX CATARACT REMOVAL Bilateral 2015    HX CATARACT REMOVAL  01/09/2018    secondary cataracts removed    HX CHOLECYSTECTOMY  1993    HX DILATION AND CURETTAGE  2000    HX HYSTERECTOMY  2000    complete    HX NEPHRECTOMY Right 2020    stage 2 cancer, no further tx needed    IR INSERT TUNL CVC W PORT OVER 5 YEARS  7/26/2021       Expanded or extensive additional review of patient history:     Home Situation  Home Environment: Private residence  # Steps to Enter: 0  One/Two Story Residence: One story  Living Alone: No  Support Systems: Spouse/Significant Other/Partner  Patient Expects to be Discharged to[de-identified] [employer]  Current DME Used/Available at [facility] 30, straight (does not use at baseline;  purchasing shower chair)  Tub or Shower Type: Shower    Hand dominance: Right    EXAMINATION OF PERFORMANCE DEFICITS:  Cognitive/Behavioral Status:  Neurologic State: Alert  Orientation Level: Oriented X4  Cognition: Follows commands  Perception: Appears intact  Perseveration: No perseveration noted  Safety/Judgement: Awareness of environment; Fall prevention    Hearing: Auditory  Auditory Impairment: None    Vision/Perceptual:                   Acuity: Within Defined Limits         Range of Motion:  AROM: Within functional limits  PROM: Within functional limits       Strength:  Strength: Generally decreased, functional        Coordination:  Coordination: Within functional limits  Fine Motor Skills-Upper: Left Intact; Right Intact    Gross Motor Skills-Upper: Left Intact; Right Intact    Tone & Sensation:  Tone: Normal  Sensation: Intact          Balance:  Sitting: Intact  Standing: Impaired  Standing - Static: Good  Standing - Dynamic : Fair    Functional Mobility and Transfers for ADLs:  Bed Mobility:  Rolling: Independent  Supine to Sit: Independent  Scooting: Independent    Transfers:  Sit to Stand: Contact guard assistance (intermittent LOB in standing)  Stand to Sit: Contact guard assistance  Bed to Chair: Contact guard assistance  Toilet Transfer : Contact guard assistance    ADL Assessment:  Feeding: Independent    Oral Facial Hygiene/Grooming: Stand-by assistance (standing)    Bathing: Contact guard assistance; Adaptive equipment; Additional time    Upper Body Dressing: Modified independent    Lower Body Dressing: Setup;Stand-by assistance    Toileting: Supervision        ADL Intervention and task modifications:       Grooming  Position Performed: Standing  Washing Hands: Stand-by assistance      Lower Body Dressing Assistance  Socks: Set-up  Leg Crossed Method Used: Yes  Position Performed: Seated edge of bed    Toileting  Bladder Hygiene: Modified independent  Clothing Management: Stand-by assistance    Cognitive Retraining  Safety/Judgement: Awareness of environment; Fall prevention    Functional Measure:  Barthel Index:    Bathin  Bladder: 5  Bowels: 10  Groomin  Dressing: 10  Feeding: 10  Mobility: 10  Stairs: 5  Toilet Use: 5  Transfer (Bed to Chair and Back): 10  Total: 70/100        The Barthel ADL Index: Guidelines  1. The index should be used as a record of what a patient does, not as a record of what a patient could do. 2. The main aim is to establish degree of independence from any help, physical or verbal, however minor and for whatever reason. 3. The need for supervision renders the patient not independent. 4. A patient's performance should be established using the best available evidence. Asking the patient, friends/relatives and nurses are the usual sources, but direct observation and common sense are also important. However direct testing is not needed. 5. Usually the patient's performance over the preceding 24-48 hours is important, but occasionally longer periods will be relevant. 6. Middle categories imply that the patient supplies over 50 per cent of the effort.   7. Use of aids to be independent is allowed. Tara Torres., Barthel, D.W. ([id number]). Functional evaluation: the Barthel Index. [address] (14)2. AZUCENA Miranda, Lynn Humphrey., Salma Mcgee., Cammy, [address] (1999). Measuring the change indisability after inpatient rehabilitation; comparison of the responsiveness of the Barthel Index and Functional [place] Measure. Journal of Neurology, Neurosurgery, and Psychiatry, 66(4), 845-138. CARLENE Abdi, BRENNON Thornton, & Erick Tovar M.A. (2004.) Assessment of post-stroke quality of life in cost-effectiveness studies: The usefulness of the Barthel Index and the EuroQoL-5D. Quality of Life Research, 15, 812-20        Occupational Therapy Evaluation Charge Determination   History Examination Decision-Making   LOW Complexity : Brief history review  LOW Complexity : 1-3 performance deficits relating to physical, cognitive , or psychosocial skils that result in activity limitations and / or participation restrictions  LOW Complexity : No comorbidities that affect functional and no verbal or physical assistance needed to complete eval tasks       Based on the above components, the patient evaluation is determined to be of the following complexity level: LOW   Pain Rating:  Pt did not report any pain    Activity Tolerance:   Good and requires rest breaks    After treatment patient left in no apparent distress:    Sitting in chair and Call bell within reach    COMMUNICATION/EDUCATION:   The patients plan of care was discussed with: Physical therapist and Registered nurse. Home safety education was provided and the patient/caregiver indicated understanding., Patient/family have participated as able in goal setting and plan of care. , and Patient/family agree to work toward stated goals and plan of care. This patients plan of care is appropriate for delegation to [facility].     Thank you for this referral.  Desiree hZou OT  Time Calculation: 17 mins

## 2021-08-18 NOTE — PROGRESS NOTES
LVM for patient to see if she will accept VV with Dr. Sissy Good.   (Dr. Joanne Balderas did not have an available appt within 1 week)

## 2021-08-18 NOTE — PROGRESS NOTES
Problem: Pneumonia: Day 4  Goal: Activity/Safety  Outcome: Progressing Towards Goal  Goal: Nutrition/Diet  Outcome: Progressing Towards Goal  Goal: Medications  Outcome: Progressing Towards Goal  Goal: Respiratory  Outcome: Progressing Towards Goal  Goal: Treatments/Interventions/Procedures  Outcome: Progressing Towards Goal  Goal: Psychosocial  Outcome: Progressing Towards Goal     Problem: Falls - Risk of  Goal: *Absence of Falls  Description: Document Juanita Fall Risk and appropriate interventions in the flowsheet.   Outcome: Progressing Towards Goal  Note: Fall Risk Interventions:            Medication Interventions: Bed/chair exit alarm, Patient to call before getting OOB, Teach patient to arise slowly

## 2021-08-18 NOTE — PROGRESS NOTES
0830 Bedside shift change report given to ELMER Gee and All Higgins RN (oncoming nurse) by Abdullahi Smith RN (offgoing nurse). Report included the following information SBAR, Kardex, Intake/Output, MAR, Recent Results and Cardiac Rhythm NSR.    1000 IDR's peformed with Dr. Aleyda Mcghee and care team. Plan for PT/OT to see pt today and then potentially discharged once cleared. 1105 PT/OT at bedside working with pt.    [id number] Pt cleared to discharge home by PT/OT. Notified MD. Discharge order placed. Will work on discharging pt shortly. 1350 I have reviewed discharge instructions with the patient. Time was offered for questions and clarification. The patient verbalized understanding. IV and telemetry removed. Pt discharged with copy of AVS and all personal belongings. Pt discharged to front entrance of hospital via wheelchair with PCT.

## 2021-08-18 NOTE — PROGRESS NOTES
I notified patient's  Mr. Alysha May to let Barbaralei Plasencia know not to take ramipril moving forward due to risk of hyperkalemia. He verbalized understanding.

## 2021-08-18 NOTE — PROGRESS NOTES
0700: End of Shift Note    Bedside shift change report given to Debo Mccormick RN (oncoming nurse) by Juan J Chavez (offgoing nurse). Report included the following information SBAR, Kardex, Intake/Output, MAR, Recent Results and Cardiac Rhythm S- tach    Shift worked:  [id number]     Shift summary and any significant changes:     none     Concerns for physician to address:  WBC down, possible d/c? Zone phone for oncoming shift:          Activity:  Activity Level: Up with Assistance  Number times ambulated in hallways past shift: 0  Number of times OOB to chair past shift: 0    Cardiac:   Cardiac Monitoring: Yes      Cardiac Rhythm: Sinus Rhythm, Sinus Tach    Access:   Current line(s): PIV     Genitourinary:   Urinary status: voiding and external catheter    Respiratory:   O2 Device: None (Room air)  Chronic home O2 use?: NO  Incentive spirometer at bedside: NO     GI:  Last Bowel Movement Date: 08/12/21  Current diet:  ADULT DIET Regular  ADULT ORAL NUTRITION SUPPLEMENT Breakfast, Lunch, Dinner, HS Snack; Diabetic Supplement  Passing flatus: YES  Tolerating current diet: YES       Pain Management:   Patient states pain is manageable on current regimen: YES    Skin:  Deshawn Score: 19  Interventions: increase time out of bed, PT/OT consult and internal/external urinary devices    Patient Safety:  Fall Score:  Total Score: 1  Interventions: bed/chair alarm, gripper socks and pt to call before getting OOB       Length of Stay:  Expected LOS: 3d 2h  Actual LOS: [facility]

## 2021-08-18 NOTE — DISCHARGE SUMMARY
Hospitalist Discharge Summary     Patient ID:  Sunday Oddi  [id number]  56 y.o.  1945 8/14/2021    PCP on record: Danyelle Kolb MD    Admit date: 8/14/2021  Discharge date and time: 8/18/2021    DISCHARGE DIAGNOSIS:    Healthcare associated pneumonia  Sepsis due to above  Leukocytosis, persistent  Pericardial effusion persistent   Mild hyperkalemia resolved  Mild hyponatremia  Chronic diastolic congestive heart failure  Diabetes mellitus type 2  Dyslipidemia  History of renal cell carcinoma s/p nephrectomy  Metastatic gastric adenocarcinoma with mets to liver  CKD stage III  Incidental thyroid nodule  Anxiety     CONSULTATIONS:  IP CONSULT TO HOSPITALIST  IP CONSULT TO CARDIOLOGY  IP CONSULT TO ONCOLOGY  IP CONSULT TO PALLIATIVE CARE - PROVIDER    Excerpted HPI from H&P of Dianelys Cortes MD:  Johny Brunson is a 68 y.o.   female who presents with past medical history of chronic pericardial effusion, liver metastasis, congestive heart failure is coming the hospital chief complaints of chest pain. Patient reports pain is mostly in the substernal region, sharp, exacerbated by lying down, without any relieving factors. Does not report any associated shortness of breath, cough or phlegm. Does not report any associated fever or chills. Denies abdominal pain, nausea or vomiting. Denies focal weakness, tingling or numbness.     On arrival to ED, she was tachycardic, blood pressure was stable. On labs white blood cell count is 12.6. BMP shows a sodium of 129, potassium 5.3, creatinine 1.56 LFTs are normal.  Troponin is 0.05.  proBNP is 1859. CT chest shows evidence of moderate pericardial effusion with pneumonia and progressive hepatic metastasis and also thyroid nodules.       ______________________________________________________________________  DISCHARGE SUMMARY/HOSPITAL COURSE:  for full details see H&P, daily progress notes, labs, consult notes.      Hospital course problem wise:    Healthcare associated pneumonia  Sepsis due to above  Leukocytosis, persistent  -Patient was admitted to the hospital and underwent management as follows. CT shows evidence of bilateral patchy infiltrates.  Rapid covid negative. C/w  empiric antibiotics with vancomycin and cefepime.  She is on room air. Antibiotics were changed to doxycycline upon improvement. Lactic acid is within normal limits  White blood cell count up to 12,000, clinically doing better, no fever  Repeat chest x-ray showed persistent bilateral pleural effusion, which are small       Pericardial effusion r/o mets from gastric adeno ca  -CT shows evidence of moderate pericardial effusion that is slightly bigger when compared to previous imaging. Pushpa Cagle is currently not in tamponade and hemodynamically stable. Cardiology consulted  -Evaluated by cardiology on 6/23/2021 and felt that there was no need for pericardiocentesis at that time since no malignant cells were detected in pleural fluid. 2D echo on 08/16 showed moderate pericardial effusion and large left pleural effusion  Patient is clinically not short of breath and is on room air, continue with Lasix to help with the pleural effusion. If she becomes hypoxic or short of breath will consider thoracocentesis  Chest x-ray small left pleural effusion unchanged from chest x-ray from 08/14  Discussed with cardiology, no plan for pericardiocentesis as patient does not have tamponade. Cardiology recommended outpatient follow-up in their clinic. Mild hyperkalemia resolved  Mild hyponatremia  Chronic diastolic congestive heart failure  -Continue home Lasix.  Potassium has normalized. She was advised regarding low-salt diet. She was advised to have a BMP in 1 week.   She was told not to take ACE inhibitor.       Diabetes mellitus type 2  Dyslipidemia  History of renal cell carcinoma s/p nephrectomy  Metastatic gastric adenocarcinoma with mets to liver  -hold home glipizide.  c/w insulin sliding scale blood sugar checks  -Cont lipitor  -Lesion recommended outpatient follow-up in their clinic upon discharge for further consideration of treatment.     CKD stage III  -Creatinine is close to baseline of around 1.3-1.5.  Repeat BMP in a.m.     Incidental thyroid nodule  -not significantly changed per report from 2020. Arrange out pt f/u with PCP. Outpatient follow-up was arranged as below.     Anxiety    Patient seen and examined, vital signs are stable, lab work is at baseline was cleared by all consultant parties including oncology and also cardiology to be discharged for outpatient follow-up. Patient is symptom-free. She is close to her baseline.      _______________________________________________________________________  Patient seen and examined by me on discharge day. Pertinent Findings:  Gen:    Not in distress  Chest: Clear lungs  CVS:   Regular rhythm. No edema  Abd:  Soft, not distended, not tender  Neuro:  Alert, awake  _______________________________________________________________________  DISCHARGE MEDICATIONS:   Discharge Medication List as of 8/18/2021 12:48 PM      START taking these medications    Details   doxycycline (ADOXA) 100 mg tablet Take 1 Tablet by mouth two (2) times a day for 5 days. , Normal, Disp-10 Tablet, R-0         CONTINUE these medications which have NOT CHANGED    Details   !! capecitabine (XELODA) 150 mg tablet 2 Tablets See Admin Instructions with 1 other capecitabine prescription for 1,800 mg total. Take 1800mg PO BID for 14 days then 7 days off, Normal, Disp-56 Tablet, R-11BSA 1.79      !! capecitabine (XELODA) 500 mg tablet 3 Tablets See Admin Instructions with 1 other capecitabine prescription for 1,800 mg total. Take 1800mg PO BID for 14 days then 7 days off, Normal, Disp-84 Tablet, R-11BSA 1.79      oxyCODONE IR (ROXICODONE) 5 mg immediate release tablet Take 1 Tablet by mouth every six (6) hours as needed for Pain for up to 14 days.  Max Daily Amount: 20 mg., Normal, Disp-30 Tablet, R-0      lidocaine-prilocaine (EMLA) topical cream Apply  to affected area as needed for Pain., Normal, Disp-30 g, R-0      prochlorperazine (COMPAZINE) 10 mg tablet Take 0.5 Tablets by mouth every six (6) hours as needed for Nausea., Normal, Disp-60 Tablet, R-3      ondansetron (ZOFRAN ODT) 4 mg disintegrating tablet Take 1 Tablet by mouth every eight (8) hours as needed for Nausea or Vomiting., Normal, Disp-40 Tablet, R-1      furosemide (LASIX) 40 mg tablet Take 1 Tablet by mouth daily. , Normal, Disp-90 Tablet, R-3      azelastine (ASTELIN) 137 mcg (0.1 %) nasal spray PLACE 1 SPRAY IN BOTH NOSTRILS TWICE A DAY AS NEEDED FOR RHINITIS, Normal, Disp-1 Bottle, R-1      simvastatin (ZOCOR) 20 mg tablet TAKE 1 TABLET BY MOUTH EVERY NIGHT, Normal, Disp-90 Tablet, R-3      metFORMIN (GLUCOPHAGE) 500 mg tablet TAKE 1 TABLET BY MOUTH TWICE A DAY WITH FOOD, Normal, Disp-180 Tablet, R-3      glipiZIDE (GLUCOTROL) 5 mg tablet TAKE 1 TABLET BY MOUTH EVERY DAY, Normal, Disp-90 Tab, R-3      !! glucose blood VI test strips (blood glucose test) strip Pt tests daily. DX: E11.65, Please provide with one touch ultra test strips, Normal, Disp-100 Strip, R-11      amitriptyline (ELAVIL) 10 mg tablet Take 1 Tab by mouth nightly. Indications: sleep/anxiety, Normal, Disp-30 Tab,R-5      ramipriL (ALTACE) 10 mg capsule TAKE 2 CAPSULES BY MOUTH EVERY DAY, Normal, Disp-180 Cap,R-3      Blood-Glucose Meter misc Pt tests daily. Dx: E11.65, Normal, Disp-1 Each, R-0      !! glucose blood VI test strips (ACCU-CHEK MARY GRACE) strip Diagnosis E11.65. Pt is testing once daily. , Normal, Disp-100 Strip, R-1       !! - Potential duplicate medications found. Please discuss with provider. Her home ramipril is being discontinued. Patient Follow Up Instructions:     1. Recommended diet: Diabetic    2. Recommended activity: Activity as tolerated    3.  If you experience any of the following symptoms then please call your primary care physician or return to the emergency room if you cannot get hold of your doctor:    4. Wound Care: None    5. Lab work: Cbc and Bmp in 1 week     6. Follow up with your PCP and Dr Ladarius Mcfarland  as below for thyroid nodule     7. Please do not take ramipril. Follow-up Information     Follow up With Specialties Details [facility] MD Endocrinology Go on 3/11/2022 at 9:50am. Office will call if an earlier appointment becomes available.   (Can cancel if you see Dr. Ladarius Mcfarland)  [address]  MOB [address]      Yoana Peoples MD Hematology and Oncology, Internal Medicine, Hematology, Oncology Go on 8/24/2021 8:00am with the infusion center; 8:30am with Dr. Vikash Carlos [address] [place] [id number]  [address], [place], DO Internal Medicine Schedule an appointment as soon as possible for a visit in 1 week Office will call to schedule follow-up appointment [facility]  [address] [facility]      Cristel Petit MD  Go on 9/1/2021 Endocrinologist follow-up appointment at 11:20am [facility], [address] [facility] [address]  [facility], [address] [facility]  [phone]        ________________________________________________________________    Risk of deterioration: High    Condition at Discharge:  Stable  __________________________________________________________________    Disposition  Home with family, no needs    ____________________________________________________________________    Code Status: DNR/DNI  ___________________________________________________________________      Total time in minutes spent coordinating this discharge (includes going over instructions, follow-up, prescriptions, and preparing report for sign off to her PCP) :  35  minutes    Signed:  Daniella Colmenares MD

## 2021-08-18 NOTE — PROGRESS NOTES
Transition of Care Plan:     RUR: 31%  Disposition: Home with Follow-up Appointments  Follow up appointments: PCP; Oncology   DME needed: None  Transportation at Discharge:  Pt's  Bharti Rosario will transport at d/c.   Abad Junein or means to access home:   Access to the home. IM Medicare Letter: Given  Is patient a BCPI-A Bundle:   N/A                  If yes, was Bundle Letter given?:  N/A  Caregiver Contact: Thea Humphrey- [phone]  Discharge Caregiver contacted prior to discharge? Pt stated that CM did not have to call caregiver. 12:08pm-No further CM needs identified. CM notified pt's nurse of d/c.    12:00am- CM called Dr. Roxie Bloom office to schedule pt's follow-up appointment. Office will called to schedule follow-up appointment. AVS dated. 11:45am- CM called [facility] office to schedule pt an appointment. Appointment scheduled with Dr. Alcira Valverde on 9/1/2021 at 11:20am. AVS update. Clinical documentation needs to be faxed to [phone].    9:40am- CM met with pt at bedside to discuss d/c plan. CM inquired with pt about any needs. No new needs at this time. Medicare pt has received, reviewed, and signed 2nd  letter informing them of their right to appeal the discharge. Signed copy has been placed on pt bedside chart.    9:12am- CM called Dr. Noman Stevens office to schedule pt's follow-up appointment. Appointment already scheduled for 8/24/2021 at 8:30am. AVS updated. 8:45am- CM called Dr. Delores Moya office to schedule follow-up appointment for pt. Next available appointment with any MD is 3/11/2022 at 9:50am with Dr. Delores Moya. CM requested that pt be placed on the list of any earlier open appointments. Care Management Interventions  PCP Verified by CM: Yes  Palliative Care Criteria Met (RRAT>21 & CHF Dx)?: No  Mode of Transport at Discharge: Other (see comment) (Pt's  will transport at d/c. )  Transition of Care Consult (CM Consult):  Other (Home with Follow-up appointments)  Discharge Durable Medical Equipment: No  Physical Therapy Consult: Yes  Occupational Therapy Consult: Yes  Speech Therapy Consult: No  Current Support Network: Lives with Spouse, Own Home  Confirm Follow Up Transport: Self  [place] Resource Information Provided?: No  Discharge Location  Discharge Placement: Home (Home with Follow-up Appointments)      Ephraim Dubose [address] [place] [address] [facility]  [phone]

## 2021-08-18 NOTE — TELEPHONE ENCOUNTER
[facility] Navigator Encounter    8/18/21- Called [facility] to ensure both capecitabine prescriptions were received and per pharmacist, both rx's were received and are in process. Pharmacist asked what was the best number to reach patient and I did let them know that patient is currently in hospital so her , Quincy Falcon, should be the  for delivery set up and provided pharmacist his phone number.  Delivery is set up to arrive to patient tomorrow, 8/19/21 for $0.

## 2021-08-18 NOTE — PROGRESS NOTES
Problem: Mobility Impaired (Adult and Pediatric)  Goal: *Acute Goals and Plan of Care (Insert Text)  Description: FUNCTIONAL STATUS PRIOR TO ADMISSION: Patient was independent and active without use of DME.    HOME SUPPORT PRIOR TO ADMISSION: The patient lived with spouse but did not require assist.    Physical Therapy Goals  Initiated 8/18/2021  1. Patient will transfer from bed to chair and chair to bed with independence using the least restrictive device within 7 day(s). 2.  Patient will perform sit to stand with independence within 7 day(s). 3.  Patient will ambulate with independence for 150 feet with the least restrictive device within 7 day(s). Outcome: Not Met  PHYSICAL THERAPY EVALUATION  Patient: Jeni Coleman (89 y.o. female)  Date: 8/18/2021  Primary Diagnosis: HCAP (healthcare-associated pneumonia) [J18.9]  Pericardial effusion [I31.3]        Precautions: Fall; DNR       ASSESSMENT  Based on the objective data described below, the patient presents with generalized weakness, impaired standing balance, and slightly decreased activity tolerance/endurance all mildly limiting patients functional mobility. Patient tolerated ambulation, noted HR increase to 130's and patient with multiple minor LOB but able to self correct. Patient states that she is \"weak from not moving for four days\" and \"will be fine once she gets moving again. \" Patient has good support from her spouse at home. Discussed options for HH vs OP PT through PCP from home if she doesn't progress as expected. Encouraged her to increase her time up OOB and to ambulate with nursing while admitted to prevent further decline. Will continue to follow. Current Level of Function Impacting Discharge (mobility/balance): CGA    Functional Outcome Measure: The patient scored 70/100 on the Barthel Index outcome measure which is indicative of low dependency for functional mobility/ADL.       Other factors to consider for discharge: supportive spouse      Patient will benefit from skilled therapy intervention to address the above noted impairments. PLAN :  Recommendations and Planned Interventions: transfer training, gait training, therapeutic exercises, patient and family training/education, and therapeutic activities      Frequency/Duration: Patient will be followed by physical therapy:  3 times a week to address goals. Recommendation for discharge: (in order for the patient to meet his/her long term goals)  No skilled physical therapy/ follow up rehabilitation needs identified at this time. - Expect her to progress well with return to normal activity. Educated on options for HH vs OP PT through PCP if progression is not as expected. This discharge recommendation:  Has not yet been discussed the attending provider and/or case management    IF patient discharges home will need the following DME: none       SUBJECTIVE:   Patient stated I just need to get moving more.     OBJECTIVE DATA SUMMARY:   HISTORY:    Past Medical History:   Diagnosis Date    Chronic kidney disease     Diabetes ([facility] [address]     Type II    History of kidney cancer 06/2020    stage 2, s/p nephrectomy, no further tx necessary    Hypercholesterolemia     Hypertension     Pleural effusion, left 6/23/2021    Psychiatric disorder     anxiety     Past Surgical History:   Procedure Laterality Date    COLONOSCOPY N/A 12/10/2019    COLONOSCOPY performed by Apoorva Maciel MD at [facility]    HX CATARACT REMOVAL Bilateral 2015    HX CATARACT REMOVAL  01/09/2018    secondary cataracts removed    HX CHOLECYSTECTOMY  1993    HX DILATION AND CURETTAGE  2000    HX HYSTERECTOMY  2000    complete    HX NEPHRECTOMY Right 2020    stage 2 cancer, no further tx needed    IR INSERT TUNL CVC W PORT OVER 5 YEARS  7/26/2021       Personal factors and/or comorbidities impacting plan of care:     Home Situation  Home Environment: Private residence  # Steps to Enter: 0  One/Two Story Residence: One story  Living Alone: No  Support Systems: Spouse/Significant Other/Partner  Patient Expects to be Discharged to[de-identified] House  Current DME Used/Available at Home: Cane, straight (does not use at baseline;  purchasing shower chair)  Tub or Shower Type: Shower    EXAMINATION/PRESENTATION/DECISION MAKING:   Critical Behavior:   Alert and oriented   Hearing: Auditory  Auditory Impairment: None  Range Of Motion:  AROM: Within functional limits                       Strength:    Strength: Generally decreased, functional                    Tone & Sensation:   Tone: Normal              Sensation: Intact               Coordination:  Coordination: Within functional limits  Functional Mobility:  Bed Mobility:  Rolling: Independent  Supine to Sit: Independent     Scooting: Independent  Transfers:  Sit to Stand: Contact guard assistance (intermittent LOB in standing)  Stand to Sit: Contact guard assistance        Bed to Chair: Contact guard assistance     Balance:   Sitting: Intact  Standing: Impaired  Standing - Static: Good  Standing - Dynamic : Fair  Ambulation/Gait Training:  Distance (ft): 100 Feet (ft)  Assistive Device: Gait belt  Ambulation - Level of Assistance: Contact guard assistance        Gait Abnormalities: Decreased step clearance; Path deviations (multiple minor LOB)        Base of Support: Narrowed     Speed/Mick: Slow  Step Length: Left shortened;Right shortened            Slow. Multiple minor LOB but able to self correct/recover. Decreased mick. Intermittent path deviations     Functional Measure:  Barthel Index:    Bathin  Bladder: 5  Bowels: 10  Groomin  Dressing: 10  Feeding: 10  Mobility: 10  Stairs: 5  Toilet Use: 5  Transfer (Bed to Chair and Back): 10  Total: 70/100     The Barthel ADL Index: Guidelines  1. The index should be used as a record of what a patient does, not as a record of what a patient could do.   2. The main aim is to establish degree of independence from any help, physical or verbal, however minor and for whatever reason. 3. The need for supervision renders the patient not independent. 4. A patient's performance should be established using the best available evidence. Asking the patient, friends/relatives and nurses are the usual sources, but direct observation and common sense are also important. However direct testing is not needed. 5. Usually the patient's performance over the preceding 24-48 hours is important, but occasionally longer periods will be relevant. 6. Middle categories imply that the patient supplies over 50 per cent of the effort. 7. Use of aids to be independent is allowed. Tete Garcia., Barthel, D.W. (1328). Functional evaluation: the Barthel Index. [facility] (14)2. Alejandro Figueroa joe AZUCENA Mcghee, Kathaleen Boas., [facility] Ulloa., [place], [address] (1999). Measuring the change indisability after inpatient rehabilitation; comparison of the responsiveness of the Barthel Index and Functional [place] Measure. Journal of Neurology, Neurosurgery, and Psychiatry, 66(4), 368-184. Gina Cortez, N.J.A, BRENNON Thornton, & Karolyn Dash, MNoraA. (2004.) Assessment of post-stroke quality of life in cost-effectiveness studies: The usefulness of the Barthel Index and the EuroQoL-5D.  Quality of Life Research, 15, 247-34        Physical Therapy Evaluation Charge Determination   History Examination Presentation Decision-Making   MEDIUM  Complexity : 1-2 comorbidities / personal factors will impact the outcome/ POC  LOW Complexity : 1-2 Standardized tests and measures addressing body structure, function, activity limitation and / or participation in recreation  LOW Complexity : Stable, uncomplicated  Other outcome measures Barthel Index  MEDIUM      Based on the above components, the patient evaluation is determined to be of the following complexity level: LOW     Pain Rating:  No c/o pain     Activity Tolerance:   Good; mild tachycardia with activity After treatment patient left in no apparent distress:   Sitting in chair and Call bell within reach    COMMUNICATION/EDUCATION:   The patients plan of care was discussed with: Occupational therapist and Registered nurse. Fall prevention education was provided and the patient/caregiver indicated understanding., Patient/family have participated as able in goal setting and plan of care. and Patient/family agree to work toward stated goals and plan of care.     Thank you for this referral.  Ramone Kruse, PT, DPT   Time Calculation: 16 mins

## 2021-08-19 NOTE — PROGRESS NOTES
Care Transitions Initial Call    Call within 2 business days of discharge: Yes     Patient: Jeni Coleman Patient : 1945 MRN: [id number]    Last Discharge [address]       Complaint Diagnosis Description Type Department Provider    21 Shortness of Breath HCAP (healthcare-associated pneumonia) . .. ED to Hosp-Admission (Discharged) (ADMIT) Jakob Tsai MD; Deidre Aly, ... Was this an external facility discharge? No     Challenges to be reviewed by the provider   Additional needs identified to be addressed with provider: yes    Recommeded BMP in 1 week--per patient she has labs drawn at oncology office on 2021     Per Dr Murray Valle note:  I have placed an addendum to the the above note, and edited it to become accurate. Patient needs functional improvement prior to consideration of starting chemotherapy. Needs PT/OT    Home Health not ordered at discharge---this CTN contacted IP CM at [address] to have Home health ordered as directed by Dr Murray Valle    Per hospitalist: (cardiac effusion)  Discussed with cardiology, no plan for pericardiocentesis as patient does not have tamponade. Cardiology recommended outpatient follow-up in their clinic. Patient has OP Palliative appt on 2021 at 11:30 with Dr Soledad Gaxiola    Patient completed a DDNR this admission, however she wants all treatments until she is unable to tolerate/condition worsens       Method of communication with provider : chart routing    Discussed COVID-19 related testing which was available at this time. Test results were negative. Patient informed of results, if available? yes     Advance Care Planning:   Does patient have an Advance Directive:  yes; reviewed and current     Inpatient Readmission Risk score: Unplanned Readmit Risk Score: 28    Was this a readmission?  no     Patients top risk factors for readmission: functional physical ability and medical condition-Patient with gastric cancer w/mets, has chronic pericardial effusion   Interventions to address risk factors: Scheduled appointment with PCP-see goals, Scheduled appointment with Specialist-see gaols, Obtained and reviewed discharge summary and/or continuity of care documents, Education of patient/family/caregiver/guardian to support self-management-PNA management, Establishment or re-establishment of referrals-Home health orders need to be placed by IP CM team and Assistance in accessing community resources-[facility]    Care Transition Nurse (CTN) contacted the patient by telephone to perform post hospital discharge assessment. Verified name and  with patient as identifiers. Provided introduction to self, and explanation of the CTN role. CTN reviewed discharge instructions, medical action plan and red flags with patient who verbalized understanding. Were discharge instructions available to patient? yes. Reviewed appropriate site of care based on symptoms and resources available to patient including: PCP and Specialist. Patient given an opportunity to ask questions and does not have any further questions or concerns at this time. The patient agrees to contact the PCP office for questions related to their healthcare. Medication reconciliation was performed with patient, who verbalizes understanding of administration of home medications. Advised obtaining a 90-day supply of all daily and as-needed medications. Referral to Pharm D needed: no     Home Health/Outpatient orders at discharge: PT/OT recommended by oncologist--was not ordered at discharge. CTN called IP CM to ask for orders  [address]: pending  Date of initial visit: pending    Durable Medical Equipment ordered at discharge: None    Covid Risk Education    Educated patient about risk for severe COVID-19 due to risk factors according to CDC guidelines. CTN reviewed discharge instructions, medical action plan and red flag symptoms with the patient who verbalized understanding. Discussed COVID vaccination status: yes. Education provided on COVID-19 vaccination as appropriate. Discussed exposure protocols and quarantine with CDC Guidelines. Patient was given an opportunity to verbalize any questions and concerns and agrees to contact CTN or health care provider for questions related to their healthcare. Discussed follow-up appointments. If no appointment was previously scheduled, appointment scheduling offered: yes. Is follow up appointment scheduled within 7 days of discharge? yes. [facility] follow up appointment(s):   Future Appointments   Date Time Provider Sindi Parekh   8/20/2021  3:15 PM Janae Hamilton MD [facility] BS AMB   8/24/2021  8:00 AM WALTON LONG3 [address] REG   8/24/2021  8:30 AM Kristine Mclean NP [facility] BS AMB   9/7/2021 11:30 AM Demetra Holliday MD [facility] BS AMB   9/14/2021  9:00 AM WALTON LONG4 [place] [address] REG   10/5/2021  8:00 AM [address] [place] [address],[address] REG   12/20/2021 11:30 AM Rani Castro III, DO [facility] BS AMB   3/11/2022  9:50 AM Uri Kuo MD RDE [facility] [address] BS AMB     [facility] follow up appointment(s): Dr Saida Wahl, endocrine for follow up thyroid 9/7 at 11:30    Plan for follow-up call in 1-2 days based on severity of symptoms and risk factors. Plan for next call: referrals-home health and follow up on home oral chemo   CTN provided contact information for future needs. Goals      Prevent complications post hospitalization. 08/19/21  CTN spoke to patient today to review discharge instructions/red flags to prevent complications. Appts:    PCP--Dr Gomez---per d/c notes, office will call patient to schedule appt    Dr Noman Stevens, oncology---8/24 at 8:00 pt aware and will attend    Dr Srinivas Brandt, endocrine---9/1 at 11:20 for thyroid management, pt aware of appt    Dr Eric Camarena, palliative---9/7 at 11:30 pt aware of appt    Per Dr Noman Stevens [facility] hospital note--[facility] , no HH ordered.  CTN called IP CM Galeana Kiss and Drena Meals. They will order the [facility] and contact patient.  Patient reports she is doing well other than feeling fatigued. She denies any CP, SOB, fever today.  CTN encouraged her to deep breath and cough, ambulate as much as she can to help with PNA recovery.  Patient reports her  will  abx today--the pharmacy she goes to did not have ready yesterday.  Patient states she was supposed to receive an oral medication for treating her cancer today, had not received yet. CTN called Dr Sanchez All office , left message on nurse VM asking them to follow up and contact patient with tracking number if available. CTN relayed this information to patient. [address] information provided    CTN will follow up tomorrow on [facility] orders, follow up on home oral medication--mkrw    UPDATE:  CTN received call from [address] [facility] at [facility] states that pt declined services while there so patient needs to get her [facility] orders sent by PCP if she has changed her mind. CTN sent message to Dr Oseas Su, who will see patient tomorrow to request [facility]. CTN called patient to tell her that she needs to ask PCP for Home health at [facility] if MD does not bring this up. Pt verbalizes understanding. ---mkrw

## 2021-08-20 PROBLEM — E11.22 TYPE 2 DIABETES MELLITUS WITH CHRONIC KIDNEY DISEASE (HCC): Status: ACTIVE | Noted: 2021-01-01

## 2021-08-20 NOTE — PROGRESS NOTES
Katja Garcia is a 68 y.o. female here for follow up for met gastric carcinoma. Treatment today:  Cycle 1 Day 1 CAPOX + Pembrolizumab + Trastuzumab    1. Have you been to the ER, urgent care clinic since your last visit? Hospitalized since your last visit? 8/14/21 - 8/18/21 Pneumonia/sepsis    2. Have you seen or consulted any other health care providers outside of the [address] [place] [address] since your last visit? Include any pap smears or colon screening. no    Pt states she is doing well. No complaints.

## 2021-08-20 NOTE — PROGRESS NOTES
Jovita Tena is a 68 y.o. female who presents for hospital follow up. Admitted 8/14-8/18 with pneumonia, sepsis, pericardial effusion. The patient has chronic CHF, diabetes, metastatic gastric cancer with liver mets, CKD. She presents to the hospital with chest pain worse when supine. By CT scan she was found to have moderate pericardial effusion with pneumonia and progressive hepatic metastasis with thyroid nodules. During hospitalization she was seen by cardiology and no pericardiocentesis recommended     Review of medications at discharge. Was given doxycycline for 5 days. Reports minimal SOB, no cough. No fever. No chest pain. No dizziness. No palpitations. Eating well. Normal BM. Normal urination. Sees Dr Alli Silva, oncology, to start chemo next week. This is an established visit conducted via telemedicine with video. The patient has been instructed that this meets HIPAA criteria and acknowledges and agrees to this method of visitation. Pursuant to the emergency declaration under the [facility], [facility] waiver authority and the [employer] Act, this Virtual Visit was conducted, with patient's consent, to reduce the patient's risk of exposure to COVID-19 and provide continuity of care for an established patient. Services were provided through a video synchronous discussion virtually to substitute for in-person clinic visit.         Past Medical History:   Diagnosis Date    Chronic kidney disease     Diabetes ([facility] [address]     Type II    History of kidney cancer 06/2020    stage 2, s/p nephrectomy, no further tx necessary    Hypercholesterolemia     Hypertension     Pleural effusion, left 6/23/2021    Psychiatric disorder     anxiety       Family History   Adopted: Yes   Problem Relation Age of Onset    Cancer Daughter         lung    Cancer Son         colon       Social History     Socioeconomic History    Marital status:      Spouse name: Not on file    Number of children: Not on file    Years of education: Not on file    Highest education level: Not on file   Occupational History    Not on file   Tobacco Use    Smoking status: Never Smoker    Smokeless tobacco: Never Used   Substance and Sexual Activity    Alcohol use: No     Alcohol/week: 0.0 standard drinks    Drug use: No    Sexual activity: Yes     Partners: Male     Birth control/protection: None   Other Topics Concern    Not on file   Social History Narrative    2 children  from cancer. Social Determinants of Health     Financial Resource Strain:     Difficulty of Paying Living Expenses:    Food Insecurity:     Worried About Running Out of Food in the Last Year:     [address] in the Last Year:    Transportation Needs:     Lack of Transportation (Medical):  Lack of Transportation (Non-Medical):    Physical Activity:     Days of Exercise per Week:     Minutes of Exercise per Session:    Stress:     Feeling of Stress :    Social Connections:     Frequency of Communication with Friends and Family:     Frequency of Social Gatherings with Friends and Family:     Attends Hindu Services:     Active Member of Clubs or Organizations:     Attends Club or Organization Meetings:     Marital Status:    Intimate Partner Violence:     Fear of Current or Ex-Partner:     Emotionally Abused:     Physically Abused:     Sexually Abused:        Current Outpatient Medications on File Prior to Visit   Medication Sig Dispense Refill    doxycycline (ADOXA) 100 mg tablet Take 1 Tablet by mouth two (2) times a day for 5 days. 10 Tablet 0    oxyCODONE IR (ROXICODONE) 5 mg immediate release tablet Take 1 Tablet by mouth every six (6) hours as needed for Pain for up to 14 days. Max Daily Amount: 20 mg. 30 Tablet 0    lidocaine-prilocaine (EMLA) topical cream Apply  to affected area as needed for Pain.  30 g 0    prochlorperazine (COMPAZINE) 10 mg tablet Take 0.5 Tablets by mouth every six (6) hours as needed for Nausea. 60 Tablet 3    ondansetron (ZOFRAN ODT) 4 mg disintegrating tablet Take 1 Tablet by mouth every eight (8) hours as needed for Nausea or Vomiting. 40 Tablet 1    furosemide (LASIX) 40 mg tablet Take 1 Tablet by mouth daily. 90 Tablet 3    simvastatin (ZOCOR) 20 mg tablet TAKE 1 TABLET BY MOUTH EVERY NIGHT 90 Tablet 3    metFORMIN (GLUCOPHAGE) 500 mg tablet TAKE 1 TABLET BY MOUTH TWICE A DAY WITH FOOD 180 Tablet 3    glipiZIDE (GLUCOTROL) 5 mg tablet TAKE 1 TABLET BY MOUTH EVERY DAY 90 Tab 3    glucose blood VI test strips (blood glucose test) strip Pt tests daily. DX: E11.65, Please provide with one touch ultra test strips 100 Strip 11    amitriptyline (ELAVIL) 10 mg tablet Take 1 Tab by mouth nightly. Indications: sleep/anxiety 30 Tab 5    Blood-Glucose Meter misc Pt tests daily. Dx: E11.65 1 Each 0    glucose blood VI test strips (ACCU-CHEK MARY GRACE) strip Diagnosis E11.65. Pt is testing once daily. 100 Strip 1    capecitabine (XELODA) 150 mg tablet 2 Tablets See Admin Instructions with 1 other capecitabine prescription for 1,800 mg total. Take 1800mg PO BID for 14 days then 7 days off (Patient not taking: Reported on 8/20/2021  ) 56 Tablet 11    capecitabine (XELODA) 500 mg tablet 3 Tablets See Admin Instructions with 1 other capecitabine prescription for 1,800 mg total. Take 1800mg PO BID for 14 days then 7 days off (Patient not taking: Reported on 8/20/2021  ) 84 Tablet 11    azelastine (ASTELIN) 137 mcg (0.1 %) nasal spray PLACE 1 SPRAY IN BOTH NOSTRILS TWICE A DAY AS NEEDED FOR RHINITIS (Patient not taking: Reported on 8/20/2021) 1 Bottle 1     No current facility-administered medications on file prior to visit. Review of Systems  Pertinent items are noted in HPI.     Objective:     Gen: well appearing female  HEENT: normal conjunctiva, no audible congestion, patient does not see oral erythema, has MMM  Neck: patient does not feel enlarged or tender LAD or masses  Resp: normal respiratory effort, no audible wheezing. CV: patient does not feel palpitations or heart irregularity  Abd: patient does not feel abdominal tenderness or mass, patient does not notice distension  Extrem: patient does not see swelling in ankles or joints. Neuro: Alert and oriented, able to answer questions without difficulty, able to move all extremities and walk normally        Assessment/Plan:       ICD-10-CM ICD-9-CM    1. Pericardial effusion  I31.3 423.9    2. Metastasis from gastric cancer (HCC)  C79.9 199.1     C16.9 151.9    3. Type 2 diabetes mellitus with diabetic chronic kidney disease, unspecified CKD stage, unspecified whether long term insulin use (HCC)  E11.22 250.40      585. 9    Patient feels well and is to follow-up with oncology next week. This was a telemedicine visit with video.         Denny Bolivar MD

## 2021-08-21 NOTE — TELEPHONE ENCOUNTER
I spoke with patient on 8/20 by virtual visit. She did not mention desire to have home health physical therapy, however  stated that this is something the oncologist wanted her to have. Could you please contact patient and see if that is something she is willing to participate in and then I can order home health physical therapy if she would like. We did not discuss this at her virtual visit, as she was doing very well.

## 2021-08-23 NOTE — TELEPHONE ENCOUNTER
Patient called in with concern for current fatigue, poor functional status and expected toleration of oxaliplatin chemotherapy. We discussed the role of chemotherapy in the palliative setting for management of her metastatic gastric adenocarcinoma. I reviewed the toxicity profile of targeted treatments including anti-HER-2 therapy as well as discussed the side effect profile of immunotherapy in this setting. We also discussed Xeloda and its side effect profile again. For now, the patient wishes to try treatment with Xeloda plus Keytruda plus Herceptin with hopes of palliative management of her metastatic disease. We will hold off on oxaliplatin with cycle 1 with consideration of introduction of low-dose of oxaliplatin in future cycles depending on patient functional status. Plan for start of treatment tomorrow.

## 2021-08-23 NOTE — PROGRESS NOTES
[facility] at [address] [facility] [address]  W: [phone] F: [id number] [facility] NOTE    Patient: Patrice Donohue  : 1945  MRN: [id number]    Reason for Visit:     Patrice Donohue is a 68 y.o. female with metastatic gastric adenocarcinoma who is being seen in follow-up for the start of treatment. Diagnosis:     1) Metastatic Gastric Adenocarcinoma with metastasis to Liver. HER2+  2) Renal Cell Carcinoma, Clear cell type  2020 s/p Nephrectomy     Date of Diagnosis: 2021    Current Treatment:     2021: Started palliative treatment with Capecitabine, Herceptin, and Pembrolizumab - today is Cycle 1 Day 1 (Holding oxaliplatin d/t toxicity profile)     Treatment History:     1) Liver biopsy completed 2021, positive for adenocarcinoma  2) Staging PET scan completed 21 -showed is in the liver compatible with metastatic disease, as well as hypermetabolic portacaval lymph nodes. No other clear sites of disease  3) EGD completed 21 -showed a partially obstructing cratered gastric ulcer in the prepyloric region of the stomach. A second large ulcerated partially circumferential mass with no bleeding was found in the prepyloric region of the stomach as well, highly suspicious for gastric malignancy. Colonoscopy shows diverticulosis only     History of Present Illness:     Very pleasant 68 y.o. female with past medical history notable for hypertension, DM2, hyperlipidemia and a recent history of early stage renal cell carcinoma, s/p nephrectomy in 2020, who initially presented to the ED with complaints of shortness of breath, with subsequent imaging revealing large left sided pleural effusion in 2021. The patient had thoracentesis on 21 with about 800 cc drained. Cytology was negative.   She also has radiographic evidence of pericardial effusion and questionable pericardial implant but without signs of tamponade.      Staging imaging was notable for multiple subcentimeter liver lesions concerning for metastases. Chest CT did not show clear evidence of primary malignancy though there was a possible pericardial implant noted on CT chest.     Clinically, the patient was doing fairly well with symptoms of progressive cough, shortness of breath as well as development of lower extremity edema.     She was discharged from the hospital and was planned for outpatient liver biopsy. Since hospital discharge, patient had biopsy of liver lesion that returned positive for adenocarcinoma with unclear primary site. Immunohistochemistry suggests that the primary may be from pancreatobiliary, cholangiocarcinoma, or upper GI. No corresponding lesion seen on available CT abdomen pelvis.     Interval History:      The patient is here today with her  to start palliative treatment. Treatment was delayed d/t recent hospitalization. She presented to the ED with increased SOB and was found to have a pericardial effusion. She is on lasix and breathing has improved. She still notes mild dyspnea with exertion. She is concerned about the toxicity of chemotherapy and the impact on her quality of life. After discussion, it was decided to hold off on oxaliplatin for now.  She     Positive ROS findings include: dyspnea with exertion, fatigue, decreased appetite    ECOG Performance Status: 0-1    ALLERGIES:  No Known Allergies    MEDICATIONS:  Current Outpatient Medications on File Prior to Visit   Medication Sig Dispense Refill    capecitabine (XELODA) 150 mg tablet 2 Tablets See Admin Instructions with 1 other capecitabine prescription for 1,800 mg total. Take 1800mg PO BID for 14 days then 7 days off 56 Tablet 11    capecitabine (XELODA) 500 mg tablet 3 Tablets See Admin Instructions with 1 other capecitabine prescription for 1,800 mg total. Take 1800mg PO BID for 14 days then 7 days off 84 Tablet 11    oxyCODONE IR (ROXICODONE) 5 mg immediate release tablet Take 1 Tablet by mouth every six (6) hours as needed for Pain for up to 14 days. Max Daily Amount: 20 mg. 30 Tablet 0    lidocaine-prilocaine (EMLA) topical cream Apply  to affected area as needed for Pain. 30 g 0    prochlorperazine (COMPAZINE) 10 mg tablet Take 0.5 Tablets by mouth every six (6) hours as needed for Nausea. 60 Tablet 3    ondansetron (ZOFRAN ODT) 4 mg disintegrating tablet Take 1 Tablet by mouth every eight (8) hours as needed for Nausea or Vomiting. 40 Tablet 1    furosemide (LASIX) 40 mg tablet Take 1 Tablet by mouth daily. 90 Tablet 3    simvastatin (ZOCOR) 20 mg tablet TAKE 1 TABLET BY MOUTH EVERY NIGHT 90 Tablet 3    metFORMIN (GLUCOPHAGE) 500 mg tablet TAKE 1 TABLET BY MOUTH TWICE A DAY WITH FOOD 180 Tablet 3    glipiZIDE (GLUCOTROL) 5 mg tablet TAKE 1 TABLET BY MOUTH EVERY DAY 90 Tab 3    glucose blood VI test strips (blood glucose test) strip Pt tests daily. DX: E11.65, Please provide with one touch ultra test strips 100 Strip 11    amitriptyline (ELAVIL) 10 mg tablet Take 1 Tab by mouth nightly. Indications: sleep/anxiety 30 Tab 5    Blood-Glucose Meter misc Pt tests daily. Dx: E11.65 1 Each 0    glucose blood VI test strips (ACCU-CHEK MARY GRACE) strip Diagnosis E11.65. Pt is testing once daily. 100 Strip 1    [] doxycycline (ADOXA) 100 mg tablet Take 1 Tablet by mouth two (2) times a day for 5 days.  10 Tablet 0    azelastine (ASTELIN) 137 mcg (0.1 %) nasal spray PLACE 1 SPRAY IN BOTH NOSTRILS TWICE A DAY AS NEEDED FOR RHINITIS (Patient not taking: Reported on 2021) 1 Bottle 1     Current Facility-Administered Medications on File Prior to Visit   Medication Dose Route Frequency Provider Last Rate Last Admin    sodium chloride (NS) flush 10 mL  10 mL IntraVENous PRN Yoana Peoples MD   10 mL at 21 0830    0.9% sodium chloride injection 10 mL  10 mL IntraVENous PRN Alysa Lisa MD   10 mL at 08/24/21 0830    heparin (porcine) pf 300-500 Units  300-500 Units InterCATHeter PRN Sandra Lorenzana MD        0.9% sodium chloride infusion  25 mL/hr IntraVENous CONTINUOUS Sandra Lorenzana MD 25 mL/hr at 08/24/21 1054 25 mL/hr at 08/24/21 1054    [COMPLETED] pembrolizumab (KEYTRUDA) 200 mg in 0.9% sodium chloride 100 mL, overfill volume 10 mL IVPB  200 mg IntraVENous ONCE Sandra Lorenzana MD   IV Completed at 08/24/21 1125    trastuzumab-qyyp (TRAZIMERA) 559 mg in 0.9% sodium chloride 250 mL, overfill volume 25 mL IVPB  8 mg/kg (Treatment Plan Recorded) IntraVENous ONCE Sandra Lorenzana MD           PMH:  Past Medical History:   Diagnosis Date    Chronic kidney disease     Diabetes ([facility] [address]     Type II    History of kidney cancer 06/2020    stage 2, s/p nephrectomy, no further tx necessary    Hypercholesterolemia     Hypertension     Pleural effusion, left 6/23/2021    Psychiatric disorder     anxiety       Past Surgical History:   Procedure Laterality Date    COLONOSCOPY N/A 12/10/2019    COLONOSCOPY performed by Tracy Cagle [facility] at [facility] ENDOSCOPY    HX CATARACT REMOVAL Bilateral 2015    HX CATARACT REMOVAL  01/09/2018    secondary cataracts removed    HX CHOLECYSTECTOMY  1993    HX DILATION AND CURETTAGE  2000    HX HYSTERECTOMY  2000    complete    HX NEPHRECTOMY Right 2020    stage 2 cancer, no further tx needed    IR INSERT TUNL CVC W PORT OVER 5 YEARS  7/26/2021       Patient Active Problem List   Diagnosis Code    Diabetes mellitus ([facility] [address] E11.9    Hypertension I10    Diverticulosis K57.90    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 E11.22         SOCIAL AND FAMILY HISTORY:     Social Connections:     Frequency of Communication with Friends and Family:     Frequency of Social Gatherings with Friends and Family:     Attends Orthodox Services:     Active Member of Clubs or Organizations:     Attends Club or Organization Meetings:     Marital Status:        Family History   Adopted: Yes   Problem Relation Age of Onset    Cancer Daughter         lung    Cancer Son         colon         REVIEW OF SYSTEMS:   Review of Systems   Constitutional: Positive for malaise/fatigue and weight loss. Respiratory: Positive for shortness of breath (with exertion). Negative for cough and hemoptysis. Cardiovascular: Negative for chest pain, palpitations and leg swelling. Gastrointestinal: Negative for abdominal pain, blood in stool, constipation, diarrhea and nausea. Neurological: Negative. Objective:     Visit Vitals  /73   Pulse (!) 114   Temp 97.4 °F (36.3 °C)   Resp 16   Ht 5' 5\" (1.651 m)   Wt 144 lb (65.3 kg)   SpO2 98%   BMI 23.96 kg/m²       - Pain: Current pain and management of pain reviewed. No changes to pain plan. Pain score today: Pain Scale: 0 - No pain/10    Physical Exam  Constitutional:       Appearance: Normal appearance. She is normal weight. Cardiovascular:      Rate and Rhythm: Normal rate and regular rhythm. Heart sounds: Normal heart sounds. Pulmonary:      Effort: Pulmonary effort is normal.      Breath sounds: Normal breath sounds. Abdominal:      General: Abdomen is flat. Palpations: Abdomen is soft. Musculoskeletal:         General: Normal range of motion. Skin:     General: Skin is warm and dry. Neurological:      Mental Status: She is alert. Mental status is at baseline.    Psychiatric:         Mood and Affect: Mood normal.      Comments: Appears depressed         Results:     Lab Results   Component Value Date/Time    WBC 14.6 (H) 08/24/2021 08:29 AM HGB 9.2 (L) 08/24/2021 08:29 AM    HCT 30.7 (L) 08/24/2021 08:29 AM    PLATELET 090 (H) 80/90/6478 08:29 AM    MCV 87.0 08/24/2021 08:29 AM     Lab Results   Component Value Date/Time    Sodium 130 (L) 08/24/2021 08:29 AM    Potassium 4.7 08/24/2021 08:29 AM    Chloride 98 08/24/2021 08:29 AM    CO2 23 08/24/2021 08:29 AM    Anion gap 9 08/24/2021 08:29 AM    Glucose 164 (H) 08/24/2021 08:29 AM    BUN 26 (H) 08/24/2021 08:29 AM    Creatinine 1.04 (H) 08/24/2021 08:29 AM    BUN/Creatinine ratio 25 (H) 08/24/2021 08:29 AM    GFR est AA >60 08/24/2021 08:29 AM    GFR est non-AA 52 (L) 08/24/2021 08:29 AM    Calcium 8.3 (L) 08/24/2021 08:29 AM    Bilirubin, total 0.6 08/24/2021 08:29 AM    Alk. phosphatase 315 (H) 08/24/2021 08:29 AM    Protein, total 6.1 (L) 08/24/2021 08:29 AM    Albumin 1.8 (L) 08/24/2021 08:29 AM    Globulin 4.3 (H) 08/24/2021 08:29 AM    A-G Ratio 0.4 (L) 08/24/2021 08:29 AM    ALT (SGPT) 22 08/24/2021 08:29 AM    AST (SGOT) 36 08/24/2021 08:29 AM         Assessment:     1) Metastatic gastric adenocarcinoma with metastasis to the Liver, HER2+  - [employer] next generation sequencing shows high TMB as well as HER-2 overexpression.  - Discussed treatment options for metastatic gastric adenocarcinoma that is HER-2 positive. Specifically, we reviewed the preliminary results from the keynote 811 study that compared pembrolizumab plus trastuzumab plus chemotherapy versus trastuzumab plus chemotherapy alone. We discussed that the overall response rate was significantly higher in the pembrolizumab at 74% compared to 52% prompting accelerated approval from the FDA just 2 months ago on May 5, 2021. I reviewed the side effect profile from these medications and discussed that while overall survival and progression free survival data is not yet available from this trial, there are indications that PFS should correlate with the known improvement in overall response rates.   - Furthermore, we discussed chemotherapy options including FOLFOX versus CapeOx. After discussion, the patient wishes to pursue with CapeOx plus Herceptin plus Keytruda. - Detailed instructions and side effects profiles for CAPEOX plus Herceptin plus Keytruda given to patient. Informational packet regarding chemotherapy was also given to the patient. Consent was obtained to start chemotherapy. - Hold oxaliplatin for now. Patient is concerned about expected side effects of oxaliplatin chemotherapy and impact on her quality of life. Depending on patient's functional status, may consider adding low dose oxaliplatin in future cycles.      - Palliative treatment with Capecitabine (1 g/m2 BID for 2 weeks on then 1 week off) plus Herceptin plus Keytruda started on 8/24/2021. Holding oxaliplatin for now. Reviewed medications and potential side effects with patient and her .    - Capecitabine + Herceptin + Keytruda - Cycle 1 Day 1  - Labs reviewed. Okay for treatment today. - Adverse events: none     2) History of early stage Renal Cell Carcinoma,  - Current liver findings are unrelated to [address]. 3) Nutritional status  - Weight is down since diagnosis. Registered dietician, Emma Sharma, has reached out to patient. Reinforced importance of nutrition and minimizing weight changes during treatment. Encouraged to increase protein intake. - Able to tolerate foods and liquid without difficulty. 4) Emotional Well Being  Having some difficulty coping with the disease. Seen by Palliative Medicine during recent hospitalization and outpatient appointment scheduled on 9/7/2021. , Cosme Soler, following patient. Very supportive  and friends.       Plan:     > Start palliative treatment with Capecitabine, Herceptin, and Keytruda - Cycle 1 Day 1 today  > Consider adding Oxaliplatin with subsequent cycles  > Appointment with Palliative Medicine on 9/7/2021  > Follow-up in 3 weeks for Cycle 2 of Capecitabine, Herceptin, and Keytruda      Signed By: Erasmo Romo MD     August 24, 2021    I have personally seen and evaluated the patient in conjunction with Thomas Castillo NP. I find the patient's history and physical exam are consistent with the NP's documentation. I agree with the above assessment and plan, which I have modified as needed. Briefly this is a 43-year-old female with a frail functional status. She has metastatic gastric adenocarcinoma that is HER-2 positive. Patient was recently hospitalized for failure to thrive. Plan to hold off on oxaliplatin for cycle 1 and continue cycle 1 with capecitabine Herceptin and Keytruda. Encounter for cytotoxic chemotherapy today, labs reviewed, okay to start with chemotherapy/immunotherapy.

## 2021-08-24 NOTE — PROGRESS NOTES
[facility] - Chemotherapy Progress Note    0825- Pt admit to [facility] for C1D1 in stable condition. Assessment completed. Patient denied having any symptoms of COVID-19, i.e. SOB, coughing, fever, or generally not feeling well. Also denies having been exposed to COVID-19 recently or having had any recent contact with family/friend that has a pending COVID test.     R chest port accessed with positive blood return. Labs drawn per order and sent. Line flushed, clamped, Curos Cap applied to end clave. Pt over to MD office for appt. Orders Placed This Encounter    HEP B SURFACE AG     Standing Status:   Standing     Number of Occurrences:   1    HEPATITIS B VIRUS CORE AB, IGG/IGM     Standing Status:   Standing     Number of Occurrences:   1    CBC WITH AUTOMATED DIFF     Standing Status:   Standing     Number of Occurrences:   1    METABOLIC PANEL, COMPREHENSIVE     Standing Status:   Standing     Number of Occurrences:   1    TSH 3RD GENERATION     Standing Status:   Standing     Number of Occurrences:   1    HEPATITIS PANEL, ACUTE     Standing Status:   Standing     Number of Occurrences:   1    VERIFY LABS     Please make sure the following labs have been completed prior to releasing orders for treatment.   Add order(s) if labs are not already ordered: 1) CBC w/ diff, 2) CMP, 3) Hepatitis B testing at baseline: hepatitis B surface antigen (HBsAg), hepatitis B core antibody (anti-HBc) total immunoglobulin or IgG, and antibody to hepatitis B surface antigen (anti-HBs) 4) TSH     Standing Status:   Standing     Number of Occurrences:   1    NURSING-MISCELLANEOUS: ok to [place] [id number] Carole Mace to treat today for C1D1 per Moe Salinas NP. thanks  Sindi Chong to treat today for C1D1 per Moe Salinas NP. thanks     Standing Status:   Standing     Number of Occurrences:   1     Order Specific Question:   Description of Order:     Answer:   ok to treat per Moe Salinas NP    TREATMENT CONDITIONS     Hold treatment and notify provider if 1) ANC is less than 1000 /mm3 2) Platelets less than 47,766 / mm3 3) SCr greater than 1.5 mg/dL; CrCl less than or equal to 50 ml/min 4) AST / ALT greater than 3x ULN 5) Total Bilirubin greater than 1.5x ULN 6) Hyperglycemia greater than 250 mg/dL 7) Grade 2 diarrhea (4-6 increase in bowel movements from baseline) and / or 8) Abnormal TSH (baseline and every 6 weeks) to obtain additional order for thyroid function monitoring (such as Free T4 and Total T3)    Notify provider if the following are reported: 1) Colitis (diarrhea or severe abdominal pain) 2) Hepatitis (jaundice, severe nausea / vomiting, bruising, or bleeding) 3) Pneumonitis ( worsening cough, chest pain, or shortness of breath)     Please Ensure Office Has Given Okay To Treat Prior to Releasing Chemo/Immunotherapy     Standing Status:   Standing     Number of Occurrences:   1    REGIMEN NOTES/COMMUNICATIONS     Do NOT need Hepatitis B testing results for treatment. Standing Status:   Standing     Number of Occurrences:   1    HISTORICAL MEDICATION     capecitabine (XELODA) 850 mg/m2 by mouth twice a day starting the evening of day 1 through the morning of day 15. See prescription for current dose.      Standing Status:   Standing     Number of Occurrences:   1    sodium chloride (NS) flush 10 mL    0.9% sodium chloride injection 10 mL    heparin (porcine) pf 300-500 Units    0.9% sodium chloride infusion    pembrolizumab (KEYTRUDA) 200 mg in 0.9% sodium chloride 100 mL, overfill volume 10 mL IVPB    trastuzumab-qyyp (TRAZIMERA) 559 mg in 0.9% sodium chloride 250 mL, overfill volume 25 mL IVPB       Chemotherapy Flowsheet 8/24/2021   Cycle C1D1   Date 8/24/2021   Drug / Regimen Keytruda+Trazimera   Pre Meds given   Notes given        Patient Vitals for the past 12 hrs:   Temp Pulse Resp BP SpO2   08/24/21 1309  (!) 103 16 121/71    08/24/21 0827 97.4 °F (36.3 °C) (!) 114 16 131/73 98 %        Recent Results (from the past 12 hour(s))   CBC WITH AUTOMATED DIFF    Collection Time: 08/24/21  8:29 AM   Result Value Ref Range    WBC 14.6 (H) 3.6 - 11.0 K/uL    RBC 3.53 (L) 3.80 - 5.20 M/uL    HGB 9.2 (L) 11.5 - 16.0 g/dL    HCT 30.7 (L) 35.0 - 47.0 %    MCV 87.0 80.0 - 99.0 FL    MCH 26.1 26.0 - 34.0 PG    MCHC 30.0 30.0 - 36.5 g/dL    RDW 17.2 (H) 11.5 - 14.5 %    PLATELET 080 (H) 224 - 400 K/uL    MPV 8.9 8.9 - 12.9 FL    NRBC 0.0 0  WBC    ABSOLUTE NRBC 0.00 0.00 - 0.01 K/uL    NEUTROPHILS 79 (H) 32 - 75 %    LYMPHOCYTES 11 (L) 12 - 49 %    MONOCYTES 7 5 - 13 %    EOSINOPHILS 1 0 - 7 %    BASOPHILS 1 0 - 1 %    IMMATURE GRANULOCYTES 1 (H) 0.0 - 0.5 %    ABS. NEUTROPHILS 11.7 (H) 1.8 - 8.0 K/UL    ABS. LYMPHOCYTES 1.6 0.8 - 3.5 K/UL    ABS. MONOCYTES 1.0 0.0 - 1.0 K/UL    ABS. EOSINOPHILS 0.1 0.0 - 0.4 K/UL    ABS. BASOPHILS 0.1 0.0 - 0.1 K/UL    ABS. IMM. GRANS. 0.1 (H) 0.00 - 0.04 K/UL    DF SMEAR SCANNED      PLATELET COMMENTS Increased Platelets      RBC COMMENTS ANISOCYTOSIS  1+       METABOLIC PANEL, COMPREHENSIVE    Collection Time: 08/24/21  8:29 AM   Result Value Ref Range    Sodium 130 (L) 136 - 145 mmol/L    Potassium 4.7 3.5 - 5.1 mmol/L    Chloride 98 97 - 108 mmol/L    CO2 23 21 - 32 mmol/L    Anion gap 9 5 - 15 mmol/L    Glucose 164 (H) 65 - 100 mg/dL    BUN 26 (H) 6 - 20 MG/DL    Creatinine 1.04 (H) 0.55 - 1.02 MG/DL    BUN/Creatinine ratio 25 (H) 12 - 20      GFR est AA >60 >60 ml/min/1.73m2    GFR est non-AA 52 (L) >60 ml/min/1.73m2    Calcium 8.3 (L) 8.5 - 10.1 MG/DL    Bilirubin, total 0.6 0.2 - 1.0 MG/DL    ALT (SGPT) 22 12 - 78 U/L    AST (SGOT) 36 15 - 37 U/L    Alk.  phosphatase 315 (H) 45 - 117 U/L    Protein, total 6.1 (L) 6.4 - 8.2 g/dL    Albumin 1.8 (L) 3.5 - 5.0 g/dL    Globulin 4.3 (H) 2.0 - 4.0 g/dL    A-G Ratio 0.4 (L) 1.1 - 2.2     TSH 3RD GENERATION    Collection Time: 08/24/21  8:29 AM   Result Value Ref Range    TSH 3.01 0.36 - 3.74 uIU/mL      Medications:  Medications Administered 0.9% sodium chloride infusion     Admin Date  08/24/2021 Action  New Bag Dose  25 mL/hr Rate  25 mL/hr Route  IntraVENous Administered By  Rawleigh Lesch, RN          0.9% sodium chloride injection 10 mL     Admin Date  08/24/2021 Action  Given Dose  10 mL Route  IntraVENous Administered By  Rawleigh Lesch, RN           Admin Date  08/24/2021 Action  Given Dose  10 mL Route  IntraVENous Administered By  Rawleigh Lesch, RN          heparin (porcine) pf 300-500 Units     Admin Date  08/24/2021 Action  Given Dose  500 Units Route  InterCATHeter Administered By  Rawleigh Lesch, RN          pembrolizumab [facility]) 200 mg in 0.9% sodium chloride 100 mL, overfill volume 10 mL IVPB     Admin Date  08/24/2021 Action  New Bag Dose  200 mg Rate  236 mL/hr Route  IntraVENous Administered By  Rawleigh Lesch, RN          sodium chloride (NS) flush 10 mL     Admin Date  08/24/2021 Action  Given Dose  10 mL Route  IntraVENous Administered By  Rawleigh Lesch, RN          trastuzumab-qyyp (TRAZIMERA) 559 mg in 0.9% sodium chloride 250 mL, overfill volume 25 mL IVPB     Admin Date  08/24/2021 Action  New Bag Dose  559 mg Rate  201.1 mL/hr Route  IntraVENous Administered By  Rawleigh Lesch, RN               Pt tolerated treatment well. Port maintained positive blood return throughout treatment, flushed with positive blood return at conclusion, and de-accessed. Pt provided with education on possible side effects of medication along with discharge instructions. Pt verbalized understanding. 1310- D/c home in no distress.  Pt aware of next [facility] appointment scheduled for:    Future Appointments   Date Time Provider Sindi Parekh   9/7/2021 11:30 AM Edouard Gonzales MD [facility] BS AMB   9/14/2021  9:00 AM WALTON LONG4 [place] [address] REG   10/5/2021  8:00 AM WALTON LONG2 [place] [address] REG   12/20/2021 11:30 AM Bozena Wallis [facility] BS AMB   3/11/2022  9:50 AM Noreen Pollard, Carmelina Tipton MD RDE [facility] [address] BS AMB

## 2021-08-24 NOTE — DISCHARGE INSTRUCTIONS

## 2021-08-24 NOTE — PROGRESS NOTES
[id number] Naheed Mace  Social Work Navigator Encounter     Patient Name:  Marko Huynh    Medical History: dx metastatic gastric     Advance Directives:    Narrative: SW printed DMV with NP Carmen Sim and NP signed and provided to pt / pt spouse.      Barriers to Care:     Plan:    Referral:

## 2021-08-25 NOTE — CASE COMMUNICATION
PT admission visit canceled as per patients Request. Per patient she is scared of the current Covid Situation going around and wants no outsiders coming home including the [facility]. Per patient  she is currently on chemotherapy and her immunity is low to deal with the current covid situation.  Patient was not conviced even after the education from the PT

## 2021-08-26 NOTE — PROGRESS NOTES
Goals      Prevent complications post hospitalization. 08/19/21  CTN spoke to patient today to review discharge instructions/red flags to prevent complications. Appts:    PCP--Dr Gomez---per d/c notes, office will call patient to schedule appt    Dr Murray Valle, oncology---8/24 at 8:00 pt aware and will attend    Dr Mark Soriano, endocrine---9/1 at 11:20 for thyroid management, pt aware of appt    Dr Soledad Gaxiola, palliative---9/7 at 11:30 pt aware of appt    Per Dr Murray Valle [facility] hospital note--he wants patient to have [facility] PT/OT , no HH ordered. CTN called IP CM Lori Rey and Jose Antonio Ramos. They will order the [facility] and contact patient.  Patient reports she is doing well other than feeling fatigued. She denies any CP, SOB, fever today.  CTN encouraged her to deep breath and cough, ambulate as much as she can to help with PNA recovery.  Patient reports her  will  abx today--the pharmacy she goes to did not have ready yesterday.  Patient states she was supposed to receive an oral medication for treating her cancer today, had not received yet. CTN called Dr Janet Hager office , left message on nurse VM asking them to follow up and contact patient with tracking number if available. CTN relayed this information to patient. [address] information provided    CTN will follow up tomorrow on [facility] orders, follow up on home oral medication--r    UPDATE:  CTN received call from [address] [facility] at [facility] states that pt declined services while there so patient needs to get her [facility] orders sent by PCP if she has changed her mind. CTN sent message to Dr Jolene Desir, who will see patient tomorrow to request [facility]. CTN called patient to tell her that she needs to ask PCP for Home health at [facility] if MD does not bring this up. Pt verbalizes understanding. ---mkrw    08/20/21  CTN called patient to follow up on her chemo pills and delivery status. Pt states she received them last night.  She will have her spouse  the second delivery at the [place] tomorrow. Lilia Chino ---chacorta    08/26/21  CTN spoke to patient for updates on her condition. She reports She is doing OK, has had one round of chemo on 8/24/2021 for renal cell carcinoma. She reports she is not having any side effects other that an episode of mild diarrhea. She states she has medication for this. CTN instructed her to stay hydrated and to call MD if her diarrhea gets ahead of her meds and unable to control. Pt verbalizes understanding. Patient report that she declined [facility] PT when she spoke to the therapist. She states he informed her that they are seeing active Covid patient so she declined services at this time. She states she is up walking in the home and trying to stay as active as she can. CTN asked pt if she was considering getting the Covid booster. She will send message to Dr Dalia Kumari to ask about this and will follow his advice. Per chart review, pt attended [facility] PCP appt on 8/20. She has a palliative appt on 9/7/2021. Pt has no needs at this time.  CTN will follow up next week---chacorta

## 2021-09-02 NOTE — TELEPHONE ENCOUNTER
Called patient to advise/confirm upcoming appt with Dr. Soledad Gaxiola on 9/7/2021      at 11:30am with arrival time 11:15am.  No answer so left voicemail. Also advised to please bring in your Drivers License and Insurance Card with you to appointment as well as any medication in the original container with you to appt.

## 2021-09-03 NOTE — TELEPHONE ENCOUNTER
Patient sent a my chart message stating :  Marcos Fox would like to cancel the following appointments:     Alee Curiel MD in [address] [place] [address] ([phone]), 9/7/2021 11:30 AM     Comments: Will reschedule.   Not comfortable being out and about with my immune system compromised

## 2021-09-10 NOTE — PROGRESS NOTES
Katja Garcia is a 68 y.o. female here for follow up for met gastric carcinoma. Treatment today:  Cycle 2 Day 1 CAPOX + PEMBROLIZUMAB + TRASTUZUMAB    1. Have you been to the ER, urgent care clinic since your last visit? Hospitalized since your last visit? no    2. Have you seen or consulted any other health care providers outside of the [address] [place] [address] since your last visit? Include any pap smears or colon screening. no    Pt states she is doing well. No concerns brought up. Pt has lost 6 lbs since last visit.

## 2021-09-14 NOTE — PROGRESS NOTES
[facility] Progress Note    Date: 2021    Name: Jovita Tena    MRN: [id number]         : 1945    Ms. Opal Sampson arrived ambulatory at 1100 and in no distress for C2D1 Keytruda+Trazimera+Oxalipatin. Assessment was completed, no acute issues at this time, no new complaints voiced. Covid Screening      1. Do you have any symptoms of COVID-19? SOB, coughing, fever, or generally not feeling well ? no  2. Have you been exposed to COVID-19 recently? no  3. Have you had any recent contact with family/friend that has a pending COVID test? no    Venous Access Device Upper chest (subclavicular area, right (Active)   Central Line Being Utilized Yes 21 1100   Criteria for Appropriate Use Irritant/vesicant medication 21 1100   Site Assessment Clean, dry, & intact 21 1100   Date of Last Dressing Change 21 1100   Dressing Status New 21 1100   Dressing Type Transparent 21 1100   Action Taken Blood drawn 21 1100   Date Accessed (Medial Site) 21 1100   Access Time (Medial Site) 1115 21 1100   Access Needle Size (Site #1) 20 G 21 1100   Access Needle Length (Medial Site) 0.75 inches 21 1100   Positive Blood Return (Medial Site) Yes 21 1100   Action Taken (Medial Site) Blood drawn;Flushed 21 1100   Alcohol Cap Used Yes 21 1100     + blood return noted, labs drawn and sent. Proceeded to appt with Dr. Ms. Sravanthi Agrawal vitals were reviewed. Patient Vitals for the past 12 hrs:   Temp Pulse Resp BP SpO2   21 1648  (!) 103 16 113/70    21 1105 97.8 °F (36.6 °C) (!) 113 18 116/71 100 %       Lab results were obtained and reviewed.   Recent Results (from the past 12 hour(s))   CBC WITH AUTOMATED DIFF    Collection Time: 21 11:08 AM   Result Value Ref Range    WBC 9.4 3.6 - 11.0 K/uL    RBC 3.42 (L) 3.80 - 5.20 M/uL    HGB 9.6 (L) 11.5 - 16.0 g/dL    HCT 31.2 (L) 35.0 - 47.0 %    MCV 91.2 80.0 - 99.0 FL    MCH 28.1 26.0 - 34.0 PG    MCHC 30.8 30.0 - 36.5 g/dL    RDW 19.9 (H) 11.5 - 14.5 %    PLATELET 348 336 - 192 K/uL    MPV 8.9 8.9 - 12.9 FL    NRBC 0.0 0  WBC    ABSOLUTE NRBC 0.00 0.00 - 0.01 K/uL    NEUTROPHILS 66 32 - 75 %    LYMPHOCYTES 17 12 - 49 %    MONOCYTES 11 5 - 13 %    EOSINOPHILS 4 0 - 7 %    BASOPHILS 1 0 - 1 %    IMMATURE GRANULOCYTES 1 (H) 0.0 - 0.5 %    ABS. NEUTROPHILS 6.3 1.8 - 8.0 K/UL    ABS. LYMPHOCYTES 1.6 0.8 - 3.5 K/UL    ABS. MONOCYTES 1.0 0.0 - 1.0 K/UL    ABS. EOSINOPHILS 0.4 0.0 - 0.4 K/UL    ABS. BASOPHILS 0.1 0.0 - 0.1 K/UL    ABS. IMM. GRANS. 0.1 (H) 0.00 - 0.04 K/UL    DF AUTOMATED     METABOLIC PANEL, COMPREHENSIVE    Collection Time: 09/14/21 11:08 AM   Result Value Ref Range    Sodium 134 (L) 136 - 145 mmol/L    Potassium 4.6 3.5 - 5.1 mmol/L    Chloride 101 97 - 108 mmol/L    CO2 24 21 - 32 mmol/L    Anion gap 9 5 - 15 mmol/L    Glucose 159 (H) 65 - 100 mg/dL    BUN 30 (H) 6 - 20 MG/DL    Creatinine 1.19 (H) 0.55 - 1.02 MG/DL    BUN/Creatinine ratio 25 (H) 12 - 20      GFR est AA 53 (L) >60 ml/min/1.73m2    GFR est non-AA 44 (L) >60 ml/min/1.73m2    Calcium 7.6 (L) 8.5 - 10.1 MG/DL    Bilirubin, total 0.4 0.2 - 1.0 MG/DL    ALT (SGPT) 30 12 - 78 U/L    AST (SGOT) 54 (H) 15 - 37 U/L    Alk. phosphatase 208 (H) 45 - 117 U/L    Protein, total 5.1 (L) 6.4 - 8.2 g/dL    Albumin 1.8 (L) 3.5 - 5.0 g/dL    Globulin 3.3 2.0 - 4.0 g/dL    A-G Ratio 0.5 (L) 1.1 - 2.2     TSH 3RD GENERATION    Collection Time: 09/14/21 11:08 AM   Result Value Ref Range    TSH 2.82 0.36 - 3.74 uIU/mL     Patient labs within parameters for treatment. Pre-medications  were administered as ordered and chemotherapy was initiated.      Medication:  Medications Administered     0.9% sodium chloride infusion     Admin Date  09/14/2021 Action  New Bag Dose  25 mL/hr Rate  25 mL/hr Route  IntraVENous Administered By  Sobia Pérez A          0.9% sodium chloride injection 10 mL     Admin Date  09/14/2021 Action  Given Dose  10 mL Route  IntraVENous Administered By  Juan Bridegroom A          dexamethasone (DECADRON) 12 mg in 0.9% sodium chloride 50 mL IVPB     Admin Date  09/14/2021 Action  New Bag Dose  12 mg Route  IntraVENous Administered By  Juan Bridegroom A          dextrose 5% infusion     Admin Date  09/14/2021 Action  New Bag Dose  25 mL/hr Rate  25 mL/hr Route  IntraVENous Administered By  Juan Bridegroom A          heparin (porcine) pf 300-500 Units     Admin Date  09/14/2021 Action  Given Dose  500 Units Route  InterCATHeter Administered By  Juan Bridegroom A          oxaliplatin (ELOXATIN) 152 mg in dextrose 5% 250 mL, overfill volume 25 mL chemo infusion     Admin Date  09/14/2021 Action  New Bag Dose  152 mg Rate  152.7 mL/hr Route  IntraVENous Administered By  [facility] Bridegroom A          palonosetron HCl (ALOXI) injection 0.25 mg     Admin Date  09/14/2021 Action  Given Dose  0.25 mg Route  IntraVENous [facility]          pembrolizumab (KEYTRUDA) 200 mg in 0.9% sodium chloride 100 mL, overfill volume 10 mL IVPB     Admin Date  09/14/2021 Action  New Bag Dose  200 mg Rate  236 mL/hr Route  IntraVENous Administered [facility]  [facility]          sodium chloride (NS) flush 10 mL     Admin Date  09/14/2021 Action  Given Dose  10 mL Route  IntraVENous Administered By  Florina Cost Date  09/14/2021 Action  Given Dose  10 mL Route  IntraVENous Administered By  [facility] Bridegroom A          trastuzumab-qyyp (TRAZIMERA) 420 mg in 0.9% sodium chloride 250 mL, overfill volume 25 mL IVPB     Admin Date  09/14/2021 Action  New Bag Dose  420 mg Rate  590 mL/hr Route  IntraVENous Administered By  [facility] Bridroom A              Patient port flushed; heparinized; and de-accessed per protocol. Ms. Lynn Connelly tolerated treatment well and was discharged from [place] [address] in stable condition at [phone].  She is aware of when to return for her next appointment.     Future Appointments   Date Time Provider Sindi Parekh   10/5/2021 11:00 AM [address] REG   10/5/2021 11:15 AM Yuki Koch NP [facility] BS AMB   10/26/2021 11:00 AM [address] REG   10/26/2021 11:15 AM Yuki Koch NP [facility] BS AMB   11/16/2021 11:00 AM WALTON BRIGETTE [facility] REG   12/20/2021 11:30 AM Anuel Steward DO [facility] BS AMB   3/11/2022  9:50 AM Martha Brennan MD RDE [facility] [address] BS AMB       Lea Patel  September 14, 2021

## 2021-09-14 NOTE — PROGRESS NOTES
Cancer [facility] at [address] [facility] [address]  W: [phone] F: [phone] [facility] NOTE    Patient: Jose Nina  : 1945  MRN: [id number]    Reason for Visit:     Jose Nina is a 68 y.o. female with metastatic gastric adenocarcinoma who is being seen in follow-up for chemotherapy. Diagnosis:     1) Metastatic Gastric Adenocarcinoma with metastasis to Liver. HER2+  2) Renal Cell Carcinoma, Clear cell type  2020 s/p Nephrectomy     Date of Diagnosis: 2021    Current Treatment:     2021: Started palliative treatment with Capecitabine, Herceptin, and Pembrolizumab - today is Cycle 2 Day 1 (Holding oxaliplatin d/t toxicity profile)     Treatment History:     1) Liver biopsy completed 2021, positive for adenocarcinoma  2) Staging PET scan completed 21 -showed is in the liver compatible with metastatic disease, as well as hypermetabolic portacaval lymph nodes. No other clear sites of disease  3) EGD completed 21 -showed a partially obstructing cratered gastric ulcer in the prepyloric region of the stomach. A second large ulcerated partially circumferential mass with no bleeding was found in the prepyloric region of the stomach as well, highly suspicious for gastric malignancy. Colonoscopy shows diverticulosis only     History of Present Illness:     Very pleasant 68 y.o. female with past medical history notable for hypertension, DM2, hyperlipidemia and a recent history of early stage renal cell carcinoma, s/p nephrectomy in 2020, who initially presented to the ED with complaints of shortness of breath, with subsequent imaging revealing large left sided pleural effusion in 2021. The patient had thoracentesis on 21 with about 800 cc drained. Cytology was negative.   She also has radiographic evidence of pericardial effusion and questionable pericardial implant but without signs of tamponade.      Staging imaging was notable for multiple subcentimeter liver lesions concerning for metastases. Chest CT did not show clear evidence of primary malignancy though there was a possible pericardial implant noted on CT chest.     Clinically, the patient was doing fairly well with symptoms of progressive cough, shortness of breath as well as development of lower extremity edema.     She was discharged from the hospital and was planned for outpatient liver biopsy. Since hospital discharge, patient had biopsy of liver lesion that returned positive for adenocarcinoma with unclear primary site. Immunohistochemistry suggests that the primary may be from pancreatobiliary, cholangiocarcinoma, or upper GI. No corresponding lesion seen on available CT abdomen pelvis.     The patient is receiving palliative treatment with Capecitabine, Herceptin, and Pembrolizumab. Oxaliplatin has been held d/t patient's concern of toxicity and the impact on her quality of life. Interval History:  Patient is here today with her . She tolerated her first cycle of treatment well. She notes fatigue and generalized weakness but is still able to perform ADLs independently. She notes reduced appetite, early satiety, and weight loss. She has been trying to eat smaller frequent meals. BMs are normal. Denies nausea. Positive ROS findings include: dyspnea with exertion, fatigue, decreased appetite, weight loss    ECOG Performance Status: 0-1    ALLERGIES:  No Known Allergies    MEDICATIONS:  Current Outpatient Medications on File Prior to Visit   Medication Sig Dispense Refill    azelastine (ASTELIN) 137 mcg (0.1 %) nasal spray PLACE 1 SPRAY IN BOTH NOSTRILS TWICE A DAY AS NEEDED FOR RHINITIS 1 Each 1    dronabinoL (MARINOL) 2.5 mg capsule Take 1 Capsule by mouth two (2) times a day.  Max Daily Amount: 5 mg. 60 Capsule 0    lidocaine-prilocaine (EMLA) topical cream Apply to affected area as needed for Pain. 30 g 0    prochlorperazine (COMPAZINE) 10 mg tablet Take 0.5 Tablets by mouth every six (6) hours as needed for Nausea. 60 Tablet 3    ondansetron (ZOFRAN ODT) 4 mg disintegrating tablet Take 1 Tablet by mouth every eight (8) hours as needed for Nausea or Vomiting. 40 Tablet 1    furosemide (LASIX) 40 mg tablet Take 1 Tablet by mouth daily. 90 Tablet 3    simvastatin (ZOCOR) 20 mg tablet TAKE 1 TABLET BY MOUTH EVERY NIGHT 90 Tablet 3    metFORMIN (GLUCOPHAGE) 500 mg tablet TAKE 1 TABLET BY MOUTH TWICE A DAY WITH FOOD 180 Tablet 3    glipiZIDE (GLUCOTROL) 5 mg tablet TAKE 1 TABLET BY MOUTH EVERY DAY 90 Tab 3    glucose blood VI test strips (blood glucose test) strip Pt tests daily. DX: E11.65, Please provide with one touch ultra test strips 100 Strip 11    amitriptyline (ELAVIL) 10 mg tablet Take 1 Tab by mouth nightly. Indications: sleep/anxiety 30 Tab 5    Blood-Glucose Meter misc Pt tests daily. Dx: E11.65 1 Each 0    glucose blood VI test strips (ACCU-CHEK MARY GRACE) strip Diagnosis E11.65. Pt is testing once daily.  100 Strip 1     Current Facility-Administered Medications on File Prior to Visit   Medication Dose Route Frequency Provider Last Rate Last Admin    sodium chloride (NS) flush 10 mL  10 mL IntraVENous PRN Lawrence Katz MD   10 mL at 09/14/21 1107    0.9% sodium chloride injection 10 mL  10 mL IntraVENous PRN Lawrence Katz MD   10 mL at 09/14/21 1107    heparin (porcine) pf 300-500 Units  300-500 Units InterCATHeter PRN Lawrence Katz MD           PMH:  Past Medical History:   Diagnosis Date    Chronic kidney disease     Diabetes ([facility] [address]     Type II    History of kidney cancer 06/2020    stage 2, s/p nephrectomy, no further tx necessary    Hypercholesterolemia     Hypertension     Pleural effusion, left 6/23/2021    Psychiatric disorder     anxiety       Past Surgical History:   Procedure Laterality Date    COLONOSCOPY N/A 12/10/2019 COLONOSCOPY performed by Gael Girard MD at hospitals ENDOSCOPY    HX CATARACT REMOVAL Bilateral 2015    HX CATARACT REMOVAL  01/09/2018    secondary cataracts removed    HX CHOLECYSTECTOMY  1993    HX DILATION AND CURETTAGE  2000    HX HYSTERECTOMY  2000    complete    HX NEPHRECTOMY Right 2020    stage 2 cancer, no further tx needed    IR INSERT TUNL CVC W PORT OVER 5 YEARS  7/26/2021       Patient Active Problem List   Diagnosis Code    Diabetes mellitus ([facility] [address] E11.9    Hypertension I10    Diverticulosis K57.90    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, F51.05    Hyperlipidemia associated with type 2 diabetes mellitus (HCC) E11.69, E78.5    Right renal mass N28.89    Pericardial effusion I31.3    HCAP (healthcare-associated pneumonia) J18.9    Metastasis from gastric cancer (HCC) C79.9, C16.9    High risk medication use Z79.899    Normocytic anemia D64.9    Type 2 diabetes mellitus with chronic kidney disease (HCC) E11.22         SOCIAL AND FAMILY HISTORY:     Social Connections:     Frequency of Communication with Friends and Family:     Frequency of Social Gatherings with Friends and Family:     Attends Scientology Services:     Active Member of Clubs or Organizations:     Attends Club or Organization Meetings:     Marital Status:        Family History   Adopted: Yes   Problem Relation Age of Onset    Cancer Daughter         lung    Cancer Son         colon         REVIEW OF SYSTEMS:   Review of Systems   Constitutional: Positive for malaise/fatigue and weight loss. Respiratory: Positive for shortness of breath (with exertion). Negative for cough and hemoptysis. Cardiovascular: Negative for chest pain, palpitations and leg swelling. Gastrointestinal: Negative for abdominal pain, blood in stool, constipation, diarrhea and nausea. Neurological: Negative.           Objective:     Visit Vitals  /71   Pulse (!) 113   Temp 97.8 °F (36.6 °C)   Resp 18   Ht 5' 5\" (1.651 m)   Wt 138 lb (62.6 kg)   SpO2 100%   BMI 22.96 kg/m²       - Pain: Current pain and management of pain reviewed. No changes to pain plan. Pain score today: Pain Scale: 0 - No pain/10      Physical Exam  Constitutional:       Appearance: Normal appearance. She is normal weight. Cardiovascular:      Rate and Rhythm: Normal rate and regular rhythm. Heart sounds: Normal heart sounds. Pulmonary:      Effort: Pulmonary effort is normal.      Breath sounds: Normal breath sounds. Abdominal:      General: Abdomen is flat. Palpations: Abdomen is soft. Musculoskeletal:         General: Normal range of motion. Skin:     General: Skin is warm and dry. Neurological:      Mental Status: She is alert. Mental status is at baseline. Psychiatric:         Mood and Affect: Mood normal.      Comments: Appears depressed         Results:     Lab Results   Component Value Date/Time    WBC 9.4 09/14/2021 11:08 AM    HGB 9.6 (L) 09/14/2021 11:08 AM    HCT 31.2 (L) 09/14/2021 11:08 AM    PLATELET 278 76/84/8086 11:08 AM    MCV 91.2 09/14/2021 11:08 AM     Lab Results   Component Value Date/Time    Sodium 134 (L) 09/14/2021 11:08 AM    Potassium 4.6 09/14/2021 11:08 AM    Chloride 101 09/14/2021 11:08 AM    CO2 24 09/14/2021 11:08 AM    Anion gap 9 09/14/2021 11:08 AM    Glucose 159 (H) 09/14/2021 11:08 AM    BUN 30 (H) 09/14/2021 11:08 AM    Creatinine 1.19 (H) 09/14/2021 11:08 AM    BUN/Creatinine ratio 25 (H) 09/14/2021 11:08 AM    GFR est AA 53 (L) 09/14/2021 11:08 AM    GFR est non-AA 44 (L) 09/14/2021 11:08 AM    Calcium 7.6 (L) 09/14/2021 11:08 AM    Bilirubin, total 0.4 09/14/2021 11:08 AM    Alk.  phosphatase 208 (H) 09/14/2021 11:08 AM    Protein, total 5.1 (L) 09/14/2021 11:08 AM    Albumin 1.8 (L) 09/14/2021 11:08 AM    Globulin 3.3 09/14/2021 11:08 AM    A-G Ratio 0.5 (L) 09/14/2021 11:08 AM    ALT (SGPT) 30 09/14/2021 11:08 AM    AST (SGOT) 54 (H) 09/14/2021 11:08 AM         Assessment:     1) Metastatic gastric adenocarcinoma with metastasis to the liver, HER2+  - Tempus next generation sequencing shows high TMB as well as HER-2 overexpression.  - Discussed treatment options for metastatic gastric adenocarcinoma that is HER-2 positive. Specifically, we reviewed the preliminary results from the keynote 811 study that compared pembrolizumab plus trastuzumab plus chemotherapy versus trastuzumab plus chemotherapy alone. We discussed that the overall response rate was significantly higher in the pembrolizumab at 74% compared to 52% prompting accelerated approval from the FDA just 2 months ago on May 5, 2021. I reviewed the side effect profile from these medications and discussed that while overall survival and progression free survival data is not yet available from this trial, there are indications that PFS should correlate with the known improvement in overall response rates. - Furthermore, we discussed chemotherapy options including FOLFOX versus CapeOx. After discussion, the patient wishes to pursue with CapeOx plus Herceptin plus Keytruda. - Detailed instructions and side effects profiles for CAPEOX plus Herceptin plus Keytruda given to patient. Informational packet regarding chemotherapy was also given to the patient. Consent was obtained to start chemotherapy. - Hold oxaliplatin for now. Patient is concerned about expected side effects of oxaliplatin chemotherapy and impact on her quality of life. Depending on patient's functional status, may consider adding low dose oxaliplatin in future cycles.      - Palliative treatment with Capecitabine (1 g/m2 BID for 2 weeks on then 1 week off) plus Herceptin plus Keytruda started on 8/24/2021. Holding oxaliplatin for now. - Capecitabine + Herceptin + Keytruda - Cycle 2 Day 1  - Labs reviewed. Okay for treatment today. - Adverse events: Gr 1/2 fatigue, Gr 1/2 anorexia    Plan to repeat scans after 3 cycles of treatment.      2) History of early stage Renal Cell Carcinoma,  - Current liver findings are unrelated to [address]. 3) Nutritional status  - Weight is down since diagnosis. Registered dietician, Carmela Ramey, has reached out to patient. Reinforced importance of nutrition and minimizing weight changes during treatment. Encouraged to increase protein intake. - Eating small frequent meals. 4) Emotional Well Being  - Having some difficulty coping with the disease. Patient cancelled her scheduled appointment with Palliative Medicine d/t concerns of COVID exposure. - , Иван Lambert, following patient. - Very supportive  and friends. Plan:     > Continue palliative treatment with Capecitabine, Herceptin, and Keytruda - Cycle 2 Day 1 today  > Consider adding Oxaliplatin with subsequent cycles  > Follow-up in 3 weeks for Cycle 3 of Capecitabine, Herceptin, and Keytruda      Signed By: Lenin Fischer MD     September 14, 2021    I have personally seen and evaluated the patient in conjunction with Niharika Tucker NP. I find the patient's history and physical exam are consistent with the NP's documentation. I agree with the above assessment and plan, which I have modified as needed. Patient doing well, continue with cytotoxic chemotherapy.

## 2021-09-14 NOTE — TELEPHONE ENCOUNTER
----- Message from Lori Edwards RN sent at 9/3/2021  1:39 PM EDT -----  Regarding: RE: Emotional Support for pt and pt's spouse  Just MAINE, patient had an appt with Dr. Peggy Vinson on 9/7/21. She called this morning and spoke with our PSR and cancelled the appt saying she did not want to go out and possibly be exposed to [place]. If patient is seen once in the office,  we could possibly do follow up visits virtually. ----- Message -----  From: Virgen Yao RN  Sent: 8/6/2021   2:56 PM EDT  To: Lori Edwards RN, Alfredito Artis RN, #  Subject: Emotional Support for pt and pt's spouse         Good evening Yessy Cervantes and Karime Marcus,    I put in a referral for the above pt. The pt's spouse is requesting palliative support. Can you please set up an appt w/ Salud ASAP.     Thank you,  Niles Astudillo RN

## 2021-09-22 NOTE — PROGRESS NOTES
Patient has graduated from the Transitions of Care Coordination  program on 9/22/2021. Patient/family has the ability to self-manage at this time Care management goals have been completed. Patient was not referred to the [facility] team for further management. Goals Addressed                 This Visit's Progress     COMPLETED: Prevent complications post hospitalization. 08/19/21  CTN spoke to patient today to review discharge instructions/red flags to prevent complications. Appts:    PCP--Dr Gomez---per d/c notes, office will call patient to schedule appt    Dr Lia Layne, oncology---8/24 at 8:00 pt aware and will attend    Dr Kerwin Urena, endocrine---9/1 at 11:20 for thyroid management, pt aware of appt    Dr Kellee Posada, palliative---9/7 at 11:30 pt aware of appt    Per Dr Lia Layne last hospital note--he wants patient to have [facility] PT/OT , no HH ordered. CTN called IP CM Pablo Shah and Natalio Suarez. They will order the [facility] and contact patient.  Patient reports she is doing well other than feeling fatigued. She denies any CP, SOB, fever today.  CTN encouraged her to deep breath and cough, ambulate as much as she can to help with PNA recovery.  Patient reports her  will  abx today--the pharmacy she goes to did not have ready yesterday.  Patient states she was supposed to receive an oral medication for treating her cancer today, had not received yet. CTN called Dr Vicente Rodrigez office , left message on nurse VM asking them to follow up and contact patient with tracking number if available. CTN relayed this information to patient. [address] information provided    CTN will follow up tomorrow on [facility] orders, follow up on home oral medication--mkrw    UPDATE:  CTN received call from [address] [place] [address],[address] at [facility] states that pt declined services while there so patient needs to get her [facility] orders sent by PCP if she has changed her mind.  CTN sent message to Dr Brigitte Ortega, who will see patient tomorrow to request [facility]. CTN called patient to tell her that she needs to ask PCP for Home health at appt if MD does not bring this up. Pt verbalizes understanding. ---mkrw    08/20/21  CTN called patient to follow up on her chemo pills and delivery status. Pt states she received them last night. She will have her spouse  the second delivery at the [facility] tomorrow. Chu Quintanilla ---mkrw    08/26/21  CTN spoke to patient for updates on her condition. She reports She is doing OK, has had one round of chemo on 8/24/2021 for renal cell carcinoma. She reports she is not having any side effects other that an episode of mild diarrhea. She states she has medication for this. CTN instructed her to stay hydrated and to call MD if her diarrhea gets ahead of her meds and unable to control. Pt verbalizes understanding. Patient report that she declined [facility] PT when she spoke to the therapist. She states he informed her that they are seeing active Covid patient so she declined services at this time. She states she is up walking in the home and trying to stay as active as she can. CTN asked pt if she was considering getting the Covid booster. She will send message to Dr Horacio Chapman to ask about this and will follow his advice. We reviewed Covid precautions again to ensure pt is doing everything she can to keep safe. CTN also reviewed importance of eating and drinking to keep herself \"built up\" for next round of treatment. Patient states her spouse is very good about running to the store to get what she has a taste for. Per chart review, pt attended [facility] PCP appt on 8/20. She has a palliative appt on 9/7/2021. Pt has no needs at this time. CTN will follow up next week---mkrw    09/22/21  CTN unable to reach patient on phone, LM on . Chart reviewed---pt cancelled palliative doctor appt due to concerns with COVID19. Oncology office is working with her to assist patient with possible VV.  Per chart notes, patient is also getting support from dietician and SW through oncology office. No readmissions evidenced during 30 day COLLEEN--mkrw              Patient has Care Transition Nurse's contact information for any further questions, concerns, or needs.   Patients upcoming visits:    Future Appointments   Date Time Provider Sindi Parekh   10/5/2021 11:00 AM [place]   10/5/2021 11:15 AM Shreyas Hager NP ONC BS AMB   10/26/2021 11:00 AM [address] REG   10/26/2021 11:15 AM Shreyas Hager NP ONC BS AMB   11/16/2021 11:00 AM WALTON LONG5 [place] [facility] REG   12/20/2021 11:30 AM Montana Gray III, DO [facility] BS AMB   3/11/2022  9:50 AM Ryan Bruno MD RDE JUAN C [address] BS AMB

## 2021-09-27 NOTE — TELEPHONE ENCOUNTER
Patient's  called and wants to know \"should he call Hospice and if so, when? \". Please call patient's .

## 2021-09-29 NOTE — TELEPHONE ENCOUNTER
Patient calling to see if O2 would help her breathe better and if this covered under Medicare. Please call to discuss.

## 2021-09-30 NOTE — PROGRESS NOTES
[facility] Visit Note    [address] [facility] on appointment. Pt arrived at [facility] via  for IV hydration. Pt reports weakness and SOB. Port accessed per protocol with positive blood return after position changes. CK, NP at bedside. Patient denied having any symptoms of COVID-19, i.e. SOB, coughing, fever, or generally not feeling well. Also denies having been exposed to COVID-19 recently or having had any recent contact with family/friend that has a pending COVID test.     Patient Vitals for the past 12 hrs:   Temp Pulse BP SpO2   09/30/21 1648 96.9 °F (36.1 °C) 98 120/69 97 %   09/30/21 1524 97.3 °F (36.3 °C) (!) 111 122/78 97 %     Recent Results (from the past 12 hour(s))   METABOLIC PANEL, COMPREHENSIVE    Collection Time: 09/30/21  3:44 PM   Result Value Ref Range    Sodium 128 (L) 136 - 145 mmol/L    Potassium 4.4 3.5 - 5.1 mmol/L    Chloride 94 (L) 97 - 108 mmol/L    CO2 24 21 - 32 mmol/L    Anion gap 10 5 - 15 mmol/L    Glucose 146 (H) 65 - 100 mg/dL    BUN 46 (H) 6 - 20 MG/DL    Creatinine 1.06 (H) 0.55 - 1.02 MG/DL    BUN/Creatinine ratio 43 (H) 12 - 20      GFR est AA >60 >60 ml/min/1.73m2    GFR est non-AA 50 (L) >60 ml/min/1.73m2    Calcium 7.4 (L) 8.5 - 10.1 MG/DL    Bilirubin, total 0.5 0.2 - 1.0 MG/DL    ALT (SGPT) 60 12 - 78 U/L    AST (SGOT) 105 (H) 15 - 37 U/L    Alk.  phosphatase 404 (H) 45 - 117 U/L    Protein, total 4.8 (L) 6.4 - 8.2 g/dL    Albumin 1.4 (L) 3.5 - 5.0 g/dL    Globulin 3.4 2.0 - 4.0 g/dL    A-G Ratio 0.4 (L) 1.1 - 2.2     CBC WITH AUTOMATED DIFF    Collection Time: 09/30/21  3:44 PM   Result Value Ref Range    WBC 15.1 (H) 3.6 - 11.0 K/uL    RBC 3.74 (L) 3.80 - 5.20 M/uL    HGB 10.6 (L) 11.5 - 16.0 g/dL    HCT 33.2 (L) 35.0 - 47.0 %    MCV 88.8 80.0 - 99.0 FL    MCH 28.3 26.0 - 34.0 PG    MCHC 31.9 30.0 - 36.5 g/dL    RDW 21.7 (H) 11.5 - 14.5 %    PLATELET 811 (H) 872 - 400 K/uL    MPV 9.0 8.9 - 12.9 FL    NRBC 0.0 0  WBC    ABSOLUTE NRBC 0.00 0.00 - 0.01 K/uL    NEUTROPHILS 79 (H) 32 - 75 %    LYMPHOCYTES 13 12 - 49 %    MONOCYTES 7 5 - 13 %    EOSINOPHILS 0 0 - 7 %    BASOPHILS 0 0 - 1 %    IMMATURE GRANULOCYTES 1 (H) 0.0 - 0.5 %    ABS. NEUTROPHILS 11.8 (H) 1.8 - 8.0 K/UL    ABS. LYMPHOCYTES 2.0 0.8 - 3.5 K/UL    ABS. MONOCYTES 1.1 (H) 0.0 - 1.0 K/UL    ABS. EOSINOPHILS 0.0 0.0 - 0.4 K/UL    ABS. BASOPHILS 0.0 0.0 - 0.1 K/UL    ABS. IMM. GRANS. 0.2 (H) 0.00 - 0.04 K/UL    DF SMEAR SCANNED      RBC COMMENTS ANISOCYTOSIS  1+        RBC COMMENTS MACROCYTOSIS  PRESENT           Medications received:  Medications Administered     heparin (porcine) pf 300-500 Units     Admin Date  09/30/2021 Action  Given Dose  500 Units Route  InterCATHeter Administered By  Eulalio Calderon RN          sodium chloride (NS) flush 10 mL     Admin Date  09/30/2021 Action  Given Dose  10 mL Route  IntraVENous Administered By  Eulalio Calderon RN          sodium chloride 0.9 % bolus infusion 1,000 mL     Admin Date  09/30/2021 Action  New Bag Dose  1,000 mL Rate  1,000 mL/hr Route  IntraVENous Administered By  Eulalio Calderon RN                0665 Tolerated treatment well, no adverse reaction noted. Pt reports feeling a little better, a little stronger. CK,NP in to see patient. Port flushed and de-accessed. D/Cd from [facility] via wc accompanied by spouse.   Next appt 10/5

## 2021-09-30 NOTE — TELEPHONE ENCOUNTER
Return call placed to pt. HIPAA verified by two patient identifiers. Pt reports she can take short breaths and deep breaths; no pain. NP notified. NP will bring pt in for fluids, labs and O2 evaluation today. PT advised. Okay per pt.  Pt has a [facility] appt today at 3pm. Pt made aware

## 2021-10-04 NOTE — PROGRESS NOTES
Kip Shaffer is a 68 y.o. female here for follow up for met gastric carcinoma. Treatment today:  Cycle 3 Day 1 CAPOX + Pembroluzimab + Trastuzumab    1. Have you been to the ER, urgent care clinic since your last visit? Hospitalized since your last visit? no    2. Have you seen or consulted any other health care providers outside of the [address] [place] [address] since your last visit? Include any pap smears or colon screening. 5 Lesly Sultana was out yesterday and discovered 3 wounds on her bottom per . Pt states she is doing ok. She did not bring up any concerns.

## 2021-10-05 NOTE — PROGRESS NOTES
Pt arrived to [facility] ambulatory in no acute distress at 1110 for Oxaliplatin, Keytruda, and Trazimera C3 D1. Assessment unremarkable except heart rate is elevated. R chest port accessed without issue and positive blood return noted. Labs obtained, CBC with diff, CMP, TSH. Dr. Kari Stevens office messaged regarding patient blood work results. New orders placed by provider office. Patient Vitals for the past 12 hrs:   Temp Pulse Resp BP SpO2   10/05/21 1816 97.8 °F (36.6 °C) (!) 108  108/72    10/05/21 1117 97.8 °F (36.6 °C) (!) 120 16 129/78 95 %     Recent Results (from the past 12 hour(s))   CBC WITH AUTOMATED DIFF    Collection Time: 10/05/21 11:25 AM   Result Value Ref Range    WBC 13.3 (H) 3.6 - 11.0 K/uL    RBC 3.63 (L) 3.80 - 5.20 M/uL    HGB 10.4 (L) 11.5 - 16.0 g/dL    HCT 31.8 (L) 35.0 - 47.0 %    MCV 87.6 80.0 - 99.0 FL    MCH 28.7 26.0 - 34.0 PG    MCHC 32.7 30.0 - 36.5 g/dL    RDW 22.9 (H) 11.5 - 14.5 %    PLATELET 017 767 - 483 K/uL    MPV 9.1 8.9 - 12.9 FL    NRBC 0.0 0  WBC    ABSOLUTE NRBC 0.00 0.00 - 0.01 K/uL    NEUTROPHILS 77 (H) 32 - 75 %    LYMPHOCYTES 14 12 - 49 %    MONOCYTES 6 5 - 13 %    EOSINOPHILS 1 0 - 7 %    BASOPHILS 1 0 - 1 %    IMMATURE GRANULOCYTES 1 (H) 0.0 - 0.5 %    ABS. NEUTROPHILS 10.3 (H) 1.8 - 8.0 K/UL    ABS. LYMPHOCYTES 1.9 0.8 - 3.5 K/UL    ABS. MONOCYTES 0.8 0.0 - 1.0 K/UL    ABS. EOSINOPHILS 0.1 0.0 - 0.4 K/UL    ABS. BASOPHILS 0.1 0.0 - 0.1 K/UL    ABS. IMM.  GRANS. 0.1 (H) 0.00 - 0.04 K/UL    DF SMEAR SCANNED      RBC COMMENTS ANISOCYTOSIS  1+       TSH 3RD GENERATION    Collection Time: 10/05/21 11:25 AM   Result Value Ref Range    TSH 3.81 (H) 0.36 - 5.18 uIU/mL   METABOLIC PANEL, COMPREHENSIVE    Collection Time: 10/05/21 11:25 AM   Result Value Ref Range    Sodium 129 (L) 136 - 145 mmol/L    Potassium 4.7 3.5 - 5.1 mmol/L    Chloride 94 (L) 97 - 108 mmol/L    CO2 27 21 - 32 mmol/L    Anion gap 8 5 - 15 mmol/L    Glucose 156 (H) 65 - 100 mg/dL    BUN 33 (H) 6 - 20 MG/DL    Creatinine 0.98 0.55 - 1.02 MG/DL    BUN/Creatinine ratio 34 (H) 12 - 20      GFR est AA >60 >60 ml/min/1.73m2    GFR est non-AA 55 (L) >60 ml/min/1.73m2    Calcium 7.6 (L) 8.5 - 10.1 MG/DL    Bilirubin, total 0.6 0.2 - 1.0 MG/DL    ALT (SGPT) 72 12 - 78 U/L    AST (SGOT) 137 (H) 15 - 37 U/L    Alk. phosphatase 476 (H) 45 - 117 U/L    Protein, total 4.9 (L) 6.4 - 8.2 g/dL    Albumin 1.3 (L) 3.5 - 5.0 g/dL    Globulin 3.6 2.0 - 4.0 g/dL    A-G Ratio 0.4 (L) 1.1 - 2.2           Patient denied having any symptoms of COVID-19, i.e. SOB, coughing, fever, or generally not feeling well. Also denies having been exposed to COVID-19 recently or having had any recent contact with family/friend that has a pending COVID test.     The following medications administered:  Medications Administered     0.9% sodium chloride infusion     Admin Date  10/05/2021 Action  New Bag Dose  25 mL/hr Rate  25 mL/hr Route  IntraVENous Administered By  Ethan Allen RN          dexamethasone (DECADRON) 12 mg in 0.9% sodium chloride 50 mL IVPB     Admin Date  10/05/2021 Action  New Bag Dose  12 mg Route  IntraVENous Administered By  Ethan Allen RN          palonosetron HCl (ALOXI) injection 0.25 mg     Admin Date  10/05/2021 Action  Given Dose  0.25 mg Route  IntraVENous Administered By  Ethan Allen RN          pembrolizumab (KEYTRUDA) 200 mg in 0.9% sodium chloride 100 mL, overfill volume 10 mL IVPB     Admin Date  10/05/2021 Action  New Bag Dose  200 mg Rate  236 mL/hr Route  IntraVENous Administered By  Ethan Allen RN                Pt tolerated treatment well. IV flushed per policy and removed, 2x2 and tape placed. Pt discharged ambulatory in no acute distress at 1830 accompanied by . Next appointment  10/26/2021 @1100.   Future Appointments   Date Time Provider Sindi Parekh   10/26/2021 11:00 AM [address] REG   10/26/2021 11:15 AM Jenny Sage, NP [facility] BS AMB   11/16/2021 11:00 AM [address] [facility]   12/20/2021 11:30 AM Bruno Conroy DO [facility] BS AMB   3/11/2022  9:50 AM Cayetano Herndon MD RDE JUAN C [address] BS AMB

## 2021-10-05 NOTE — PROGRESS NOTES
[facility] [place] at [address] [facility] [address]  W: [phone] F: [phone] [facility] NOTE    Patient: Sulaiman Lerma  : 1945  MRN: [id number]    Reason for Visit:     Sulaiman Lerma is a 68 y.o. female with metastatic gastric adenocarcinoma who is being seen in follow-up for chemotherapy. Diagnosis:     1) Metastatic Gastric Adenocarcinoma with metastasis to Liver. HER2+  2) Renal Cell Carcinoma, Clear cell type  2020 s/p Nephrectomy     Date of Diagnosis: 2021    Current Treatment:     2021: Started palliative treatment with Capecitabine, Herceptin, and Pembrolizumab - today is Cycle 3 Day 1 (Holding oxaliplatin d/t toxicity profile)     Treatment History:     1) Liver biopsy completed 2021, positive for adenocarcinoma  2) Staging PET scan completed 21 -showed is in the liver compatible with metastatic disease, as well as hypermetabolic portacaval lymph nodes. No other clear sites of disease  3) EGD completed 21 -showed a partially obstructing cratered gastric ulcer in the prepyloric region of the stomach. A second large ulcerated partially circumferential mass with no bleeding was found in the prepyloric region of the stomach as well, highly suspicious for gastric malignancy. Colonoscopy shows diverticulosis only     History of Present Illness:     Very pleasant 68 y.o. female with past medical history notable for hypertension, DM2, hyperlipidemia and a recent history of early stage renal cell carcinoma, s/p nephrectomy in 2020, who initially presented to the ED with complaints of shortness of breath, with subsequent imaging revealing large left sided pleural effusion in 2021. The patient had thoracentesis on 21 with about 800 cc drained. Cytology was negative.  She also has radiographic evidence of pericardial effusion and questionable pericardial implant but without signs of tamponade.      Staging imaging was notable for multiple subcentimeter liver lesions concerning for metastases. Chest CT did not show clear evidence of primary malignancy though there was a possible pericardial implant noted on CT chest.     Clinically, the patient was doing fairly well with symptoms of progressive cough, shortness of breath as well as development of lower extremity edema.     She was discharged from the hospital and was planned for outpatient liver biopsy. Since hospital discharge, patient had biopsy of liver lesion that returned positive for adenocarcinoma with unclear primary site. Immunohistochemistry suggests that the primary may be from pancreatobiliary, cholangiocarcinoma, or upper GI. No corresponding lesion seen on available CT abdomen pelvis.     The patient is receiving palliative treatment with Capecitabine, Herceptin, and Pembrolizumab. Oxaliplatin has been held for cycle 1. Dose reduction for cycles 2 and 3. Interval History:  Patient is here today with her . She reports increased fatigued and weakness. She ambulates in the house with assistance and is in a wheelchair today. Home health noted several pressure ulcers on her bottom. She reports taste changes and poor appetite. She is drinking two protein shakes daily and unable to eat solid food. Home PT was out to assess the patient yesterday. Discussed transitioning to comfort care but patient wishes to continue treatment.     Positive ROS findings include: dyspnea with exertion, weakness, fatigue, decreased appetite, weight loss, bilateral lower extremity edema    ECOG Performance Status: 3-4    ALLERGIES:  No Known Allergies    MEDICATIONS:  Current Outpatient Medications on File Prior to Visit   Medication Sig Dispense Refill    azelastine (ASTELIN) 137 mcg (0.1 %) nasal spray PLACE 1 SPRAY IN BOTH NOSTRILS TWICE A DAY AS NEEDED FOR RHINITIS 1 Each 1    dronabinoL (MARINOL) 2.5 mg capsule Take 1 Capsule by mouth two (2) times a day. Max Daily Amount: 5 mg. 60 Capsule 0    lidocaine-prilocaine (EMLA) topical cream Apply  to affected area as needed for Pain. 30 g 0    prochlorperazine (COMPAZINE) 10 mg tablet Take 0.5 Tablets by mouth every six (6) hours as needed for Nausea. 60 Tablet 3    ondansetron (ZOFRAN ODT) 4 mg disintegrating tablet Take 1 Tablet by mouth every eight (8) hours as needed for Nausea or Vomiting. 40 Tablet 1    furosemide (LASIX) 40 mg tablet Take 1 Tablet by mouth daily. 90 Tablet 3    simvastatin (ZOCOR) 20 mg tablet TAKE 1 TABLET BY MOUTH EVERY NIGHT 90 Tablet 3    metFORMIN (GLUCOPHAGE) 500 mg tablet TAKE 1 TABLET BY MOUTH TWICE A DAY WITH FOOD 180 Tablet 3    glipiZIDE (GLUCOTROL) 5 mg tablet TAKE 1 TABLET BY MOUTH EVERY DAY 90 Tab 3    glucose blood VI test strips (blood glucose test) strip Pt tests daily. DX: E11.65, Please provide with one touch ultra test strips 100 Strip 11    amitriptyline (ELAVIL) 10 mg tablet Take 1 Tab by mouth nightly. Indications: sleep/anxiety 30 Tab 5    Blood-Glucose Meter misc Pt tests daily. Dx: E11.65 1 Each 0    glucose blood VI test strips (ACCU-CHEK MARY GRACE) strip Diagnosis E11.65. Pt is testing once daily.  100 Strip 1     Current Facility-Administered Medications on File Prior to Visit   Medication Dose Route Frequency Provider Last Rate Last Admin    sodium chloride (NS) flush 10 mL  10 mL IntraVENous PRN Kandi Good MD        0.9% sodium chloride injection 10 mL  10 mL IntraVENous PRN Kandi Good MD        heparin (porcine) pf 300-500 Units  300-500 Units InterCATHeter PRN Kandi Good MD        0.9% sodium chloride infusion  25 mL/hr IntraVENous CONTINUOUS Kandi Good MD        dextrose 5% infusion  25 mL/hr IntraVENous CONTINUOUS Alysa Varela MD        palonosetron HCl (ALOXI) injection 0.25 mg  0.25 mg IntraVENous ONCE Kandi Good MD        dexamethasone (DECADRON) 12 mg in 0.9% sodium chloride 50 mL IVPB  12 mg IntraVENous ONCE Annette Hull MD        pembrolizumab (KEYTRUDA) 200 mg in 0.9% sodium chloride 100 mL, overfill volume 10 mL IVPB  200 mg IntraVENous ONCE Annette Hull MD        oxaliplatin (ELOXATIN) 152 mg in dextrose 5% 250 mL, overfill volume 25 mL chemo infusion  85.02 mg/m2 (Treatment Plan Recorded) IntraVENous Kwesi Quick MD           PMH:  Past Medical History:   Diagnosis Date    Chronic kidney disease     Diabetes ([facility] [address]     Type II    History of kidney cancer 06/2020    stage 2, s/p nephrectomy, no further tx necessary    Hypercholesterolemia     Hypertension     Pleural effusion, left 6/23/2021    Psychiatric disorder     anxiety       Past Surgical History:   Procedure Laterality Date    COLONOSCOPY N/A 12/10/2019    COLONOSCOPY performed by Precious Goltz, MD at \[facility]\"" ENDOSCOPY    HX CATARACT REMOVAL Bilateral 2015    HX CATARACT REMOVAL  01/09/2018    secondary cataracts removed    HX CHOLECYSTECTOMY  1993    HX DILATION AND CURETTAGE  2000    HX HYSTERECTOMY  2000    complete    HX NEPHRECTOMY Right 2020    stage 2 cancer, no further tx needed    IR INSERT TUNL CVC W PORT OVER 5 YEARS  7/26/2021       Patient Active Problem List   Diagnosis Code    Diabetes mellitus ([facility] [address] [facility] E11.9    Hypertension I10    Diverticulosis K57.90    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, F51.05    Hyperlipidemia associated with type 2 diabetes mellitus (HCC) E11.69, E78.5    Right renal mass N28.89    Pericardial effusion I31.3    HCAP (healthcare-associated pneumonia) J18.9    Metastasis from gastric cancer (HCC) C79.9, C16.9    High risk medication use Z79.899    Normocytic anemia D64.9    Type 2 diabetes mellitus with chronic kidney disease ([facility] [address] E11.22         SOCIAL AND FAMILY HISTORY:     Social Connections:     Frequency of Communication with Friends and Family:     Frequency of Social Gatherings with Friends and Family:     Attends Yazdanism Services:     Active Member of Clubs or Organizations:     Attends Club or Organization Meetings:     Marital Status:        Family History   Adopted: Yes   Problem Relation Age of Onset    Cancer Daughter         lung    Cancer Son         colon         REVIEW OF SYSTEMS:   Review of Systems   Constitutional: Positive for malaise/fatigue and weight loss. Respiratory: Positive for shortness of breath (with exertion). Negative for cough and hemoptysis. Cardiovascular: Positive for leg swelling. Negative for chest pain and palpitations. Gastrointestinal: Negative for abdominal pain, blood in stool, constipation, diarrhea and nausea. Neurological: Positive for weakness. Objective:     Visit Vitals  /78   Pulse (!) 120   Temp 97.8 °F (36.6 °C)   Resp 16   Ht 5' 5\" (1.651 m)   Wt 138 lb 11.2 oz (62.9 kg)   SpO2 95%   BMI 23.08 kg/m²     Pain Scale: 0 - No pain/10      Physical Exam  Constitutional:       Appearance: She is normal weight. Comments: Frail appearing   Cardiovascular:      Rate and Rhythm: Normal rate and regular rhythm. Heart sounds: Normal heart sounds. Pulmonary:      Effort: Pulmonary effort is normal.      Breath sounds: Normal breath sounds. Abdominal:      General: Abdomen is flat. Palpations: Abdomen is soft. Musculoskeletal:         General: Normal range of motion. Right lower leg: Edema present. Left lower leg: Edema present. Skin:     General: Skin is warm and dry. Neurological:      Mental Status: She is alert. Mental status is at baseline.    Psychiatric:         Behavior: Behavior normal.      Comments: Appears depressed         Results:     Lab Results   Component Value Date/Time    WBC 13.3 (H) 10/05/2021 11:25 AM    HGB 10.4 (L) 10/05/2021 11:25 AM    HCT 31.8 (L) 10/05/2021 11:25 AM    PLATELET 482 93/91/6878 11:25 AM    MCV 87.6 10/05/2021 11:25 AM     Lab Results   Component Value Date/Time    Sodium 129 (L) 10/05/2021 11:25 AM Potassium 4.7 10/05/2021 11:25 AM    Chloride 94 (L) 10/05/2021 11:25 AM    CO2 27 10/05/2021 11:25 AM    Anion gap 8 10/05/2021 11:25 AM    Glucose 156 (H) 10/05/2021 11:25 AM    BUN 33 (H) 10/05/2021 11:25 AM    Creatinine 0.98 10/05/2021 11:25 AM    BUN/Creatinine ratio 34 (H) 10/05/2021 11:25 AM    GFR est AA >60 10/05/2021 11:25 AM    GFR est non-AA 55 (L) 10/05/2021 11:25 AM    Calcium 7.6 (L) 10/05/2021 11:25 AM    Bilirubin, total 0.6 10/05/2021 11:25 AM    Alk. phosphatase 476 (H) 10/05/2021 11:25 AM    Protein, total 4.9 (L) 10/05/2021 11:25 AM    Albumin 1.3 (L) 10/05/2021 11:25 AM    Globulin 3.6 10/05/2021 11:25 AM    A-G Ratio 0.4 (L) 10/05/2021 11:25 AM    ALT (SGPT) 72 10/05/2021 11:25 AM    AST (SGOT) 137 (H) 10/05/2021 11:25 AM         Assessment:     1) Metastatic gastric adenocarcinoma with metastasis to the liver, HER2+  - [employer] next generation sequencing shows high TMB as well as HER-2 overexpression.  - Discussed treatment options for metastatic gastric adenocarcinoma that is HER-2 positive. Specifically, we reviewed the preliminary results from the keynote 811 study that compared pembrolizumab plus trastuzumab plus chemotherapy versus trastuzumab plus chemotherapy alone. We discussed that the overall response rate was significantly higher in the pembrolizumab at 74% compared to 52% prompting accelerated approval from the FDA just 2 months ago on May 5, 2021. I reviewed the side effect profile from these medications and discussed that while overall survival and progression free survival data is not yet available from this trial, there are indications that PFS should correlate with the known improvement in overall response rates. - Furthermore, we discussed chemotherapy options including FOLFOX versus CapeOx. After discussion, the patient wishes to pursue with CapeOx plus Herceptin plus Keytruda.   - Detailed instructions and side effects profiles for CAPEOX plus Herceptin plus Keytruda given to patient. Informational packet regarding chemotherapy was also given to the patient. Consent was obtained to start chemotherapy. - Oxaliplatin held for cycle 1.      - Palliative treatment with Capecitabine (1 g/m2 BID for 2 weeks on then 1 week off) plus Herceptin plus Keytruda started on 8/24/2021. Oxaliplatin dose reduced for cycles 2 and 3    - Capecitabine + Oxaliplatin + Herceptin + Keytruda - Cycle 3 Day 1  - Labs reviewed. Okay for treatment today. - Adverse events: Gr 2/3 fatigue, Gr 2/3 anorexia, Gr 1 b/l LE edema    Repeat scans after 3 cycles of treatment.    2) History of early stage Renal Cell Carcinoma,  - Current liver findings are unrelated to [address]. 3) Nutritional status  - Weight is down since diagnosis. Registered dietician, Donnell Galeana, has reached out to patient. Reinforced importance of nutrition and minimizing weight changes during treatment. Encouraged to increase protein intake. - Drinking only protein shakes. Encouraged to increase daily caloric intake. - Start Mirtazapine 7.5 mg po nightly to help stimulate appetite and help with mood. 4) Emotional Well Being  - Having some difficulty coping with the disease. Patient cancelled her scheduled appointment with Palliative Medicine d/t concerns of COVID exposure. - , Jimmie Emery, following patient. - Very supportive  and friends. Plan:     > Continue palliative treatment with Capecitabine, Oxaliplatin with dose reduction, Herceptin, and Keytruda - Cycle 3 Day 1 today  > Repeat scans  > Start Mirtazapine   > Follow-up in 3 weeks for Cycle 4 of Capecitabine, Herceptin, and Keytruda      Signed By: Diana Allen MD     October 5, 2021    I have personally seen and evaluated the patient in conjunction with Lucero Noyola NP. I find the patient's history and physical exam are consistent with the NP's documentation.   I agree with the above assessment and plan, which I have modified as needed. While patient is frail, treatment with CAPOX plus Keytruda plus Herceptin gives her the best chance of disease control. At present, her functional status is poor, but this appears to be a consequence of poor p.o. intake rather than chemotherapy toxicity. Will add mirtazapine to treatment course and plan for reevaluation with scans after this cycle.

## 2021-10-06 NOTE — TELEPHONE ENCOUNTER
Received a call from Physical therapist Reese Pfeiffer.. She went and saw pt with OT today  but pt is not a great candidate due to she is  pretty much a failure to thrive. The  was mainly wanting a hospital bed and somebody to help bathe her because all she does is move from one chair to the next. She was falling asleep while they were there trying to assess her. Pt and pt  said they saw us yesterday and once scans are done will better be able to decide if treatment should be stopped. They will put her on a 2 week hold in case symptoms change. Pt needs a bedside commode and hospital bed. If she stays with PT/OT they will order this but it take up to 4 weeks to get to the pt, if pt switches to hospice then she will receive services much much quicker. Alan Garcia will send us a order for home health aide to help with assistance. She really wants to help the patient but not exactly sure what she can do with her right now. Please advise if patient needs to come into the office sooner to discuss goals of care.

## 2021-10-07 NOTE — TELEPHONE ENCOUNTER
PHI verified and HIPPA verified by two patient identifiers. Return call to pt's spouse. Spouse report pt refuse to eat or drink. Pt's spouse states pt says she has no appetite. Provider and NP aware. Spouse informed pt needs fluids. Pt scheduled for fluids at [facility] on 10/8 @ 3pm. Pt verbalized understanding.

## 2021-10-08 NOTE — PROGRESS NOTES
Patient was seen and evaluated while she was getting fluids in the infusion center, we discussed overall goals of treatment as well as the patient's functional decline over the past 2 months. After extensive discussion, as well as review of treatment planning course, the patient wishes to stop active chemotherapy and immunotherapy treatment and pursue hospice care at this time. We discussed that the goals of hospice care would be to allow her to pass peacefully without further work-up or investigation should she have symptoms. Instead, the goal would be to treat her symptoms and allow her to pass peacefully should she . I also discussed with the patient that should she change her mind about staying in hospice, she can always return to active care if she wishes. All questions were answered, we will plan to arrange for home hospice in the next few days.

## 2021-10-08 NOTE — PROGRESS NOTES
Outpatient Infusion Center Progress Note    1450  Pt arrived in stable condition for hydration. Assessment completed; patient experiencing poor appetite and lack of intake. Patient denied having any symptoms of COVID-19, i.e. SOB, coughing, fever, or generally not feeling well. Also denies having been exposed to COVID-19 recently or having had any recent contact with family/friend that has a pending COVID test.     R chest port accessed without issue and positive blood return noted. Patient Vitals for the past 12 hrs:   Temp Pulse Resp BP   10/08/21 1449 97.4 °F (36.3 °C) (!) 123 18 110/69        No results found for this or any previous visit (from the past 12 hour(s)). The following medications administered:  Medications Administered     heparin (porcine) pf 300-500 Units     Admin Date  10/08/2021 Action  Given Dose  500 Units Route  InterCATHeter Administered By  Clarisa Eller RN          lactated ringers bolus infusion 1,000 mL     Admin Date  10/08/2021 Action  New Bag Dose  1,000 mL Rate  1,000 mL/hr Route  IntraVENous Administered By  Clarisa Eller RN          sodium chloride (NS) flush 10 mL     Admin Date  10/08/2021 Action  Given Dose  10 mL Route  IntraVENous Administered By  Clarisa Eller RN           Admin Date  10/08/2021 Action  Given Dose  10 mL Route  IntraVENous Administered By  Clarisa Eller RN                Pt tolerated treatment well. IV flushed per policy and removed, 2x2 and coban placed. Pt provided with education on possible side effects of medication along with discharge instructions. Pt verbalized understanding. 1600  Pt discharged in no acute distress.  Next appointment:    Future Appointments   Date Time Provider Sindi Parekh   10/20/2021  2:00 PM [address] 3 [facility] REG   10/26/2021 11:00 AM ANTON FINLEY [facility] REG   10/26/2021 11:15 AM Teresa Portillo NP ON[facility] BS AMB   11/16/2021 11:00 AM [facility] REG   12/20/2021 11:30 AM Bruno Conroy DO [facility] BS AMB   3/11/2022  9:50 AM Cayetano Herndon MD RDE JUAN C [address] BS AMB

## 2021-10-08 NOTE — PROGRESS NOTES
[address]  Social Work Navigator Encounter     Patient Name:  Damir Cisneros    Medical History: dx     Advance Directives:    Narrative: SW spoke with spouse Zora Snyder to see time he would like hospice to come (this evening). Spouse and Christine Carranza ([phone]) from [facility] confirmed a 7pm admission tonight. FELIX faxed 19 pages.       Barriers to Care:     Plan:    Referral:     Hospice referral

## 2021-10-11 NOTE — TELEPHONE ENCOUNTER
PHI verified and HIPPA verified by two patient identifiers. Return call placed to pt's spouse. Pt's spouse advised to call the hospice nurse. Pt's spouse verbalized understanding.

## 2021-10-11 NOTE — TELEPHONE ENCOUNTER
Patient's  called regarding medication [Furosemide (Lasix) 40 mg tablet]. Patient is experiencing swelling in legs and feet and would like a call back regarding how to proceed and if a change in medication is possible.

## 2021-10-19 ENCOUNTER — TELEPHONE (OUTPATIENT)
Dept: ONCOLOGY | Age: 76
End: 2021-10-19

## 2021-10-19 NOTE — TELEPHONE ENCOUNTER
At [facility] care called to say that patient passed yesterday 10/18/21.

## 2021-10-26 ENCOUNTER — HOSPITAL ENCOUNTER (OUTPATIENT)
Dept: INFUSION THERAPY | Age: 76
End: 2021-10-26

## 2021-11-16 ENCOUNTER — APPOINTMENT (OUTPATIENT)
Dept: INFUSION THERAPY | Age: 76
End: 2021-11-16

## 2023-04-25 NOTE — PROGRESS NOTES
Care Plan Documented What Type Of Note Output Would You Prefer (Optional)?: Bullet Format Is This A New Presentation, Or A Follow-Up?: Skin Lesion

## (undated) DEVICE — NEONATAL-ADULT SPO2 SENSOR: Brand: NELLCOR

## (undated) DEVICE — SEAL UNIV 5-8MM DISP BX/10 -- DA VINCI XI - SNGL USE

## (undated) DEVICE — INFECTION CONTROL KIT SYS

## (undated) DEVICE — Z DISCONTINUED PER MEDLINE LINE GAS SAMPLING O2/CO2 LNG AD 13 FT NSL W/ TBNG FILTERLINE

## (undated) DEVICE — TISSUE RETRIEVAL SYSTEM: Brand: INZII RETRIEVAL SYSTEM

## (undated) DEVICE — CATH IV AUTOGRD BC PNK 20GA 25 -- INSYTE

## (undated) DEVICE — LAPAROSCOPIC TROCAR SLEEVE/SINGLE USE: Brand: KII® OPTICAL ACCESS SYSTEM

## (undated) DEVICE — Device

## (undated) DEVICE — AIRSEAL 12 MM ACCESS PORT AND PALM GRIP OBTURATOR WITH BLADELESS OPTICAL TIP, 100 MM LENGTH: Brand: AIRSEAL

## (undated) DEVICE — GOWN,SIRUS,NONRNF,SETINSLV,XL,20/CS: Brand: MEDLINE

## (undated) DEVICE — SYR 3ML LL TIP 1/10ML GRAD --

## (undated) DEVICE — VISUALIZATION SYSTEM: Brand: CLEARIFY

## (undated) DEVICE — REM POLYHESIVE ADULT PATIENT RETURN ELECTRODE: Brand: VALLEYLAB

## (undated) DEVICE — BLADELESS OBTURATOR: Brand: WECK VISTA

## (undated) DEVICE — NEEDLE HYPO 18GA L1.5IN PNK S STL HUB POLYPR SHLD REG BVL

## (undated) DEVICE — TIP COVER ACCESSORY

## (undated) DEVICE — TAPE,CLOTH/SILK,CURAD,3"X10YD,LF,40/CS: Brand: CURAD

## (undated) DEVICE — SPONGE HEMOSTAT CELLULS 4X8IN -- SURGICEL

## (undated) DEVICE — TOWEL 4 PLY TISS 19X30 SUE WHT

## (undated) DEVICE — ELECTRODE,RADIOTRANSLUCENT,FOAM,5PK: Brand: MEDLINE

## (undated) DEVICE — SYR 10ML LUER LOK 1/5ML GRAD --

## (undated) DEVICE — CLIP LIG ABSRB HEM-LOK LG PUR --

## (undated) DEVICE — SUTURE VCRL SZ 2-0 L27IN ABSRB VLT L26MM SH 1/2 CIR J317H

## (undated) DEVICE — STERILE POLYISOPRENE POWDER-FREE SURGICAL GLOVES: Brand: PROTEXIS

## (undated) DEVICE — SET ADMIN 16ML TBNG L100IN 2 Y INJ SITE IV PIGGY BK DISP

## (undated) DEVICE — SOLUTION IRRIG 1000ML H2O STRL BLT

## (undated) DEVICE — PLEDGET SUT SFT OVL 3 8X5 16IN

## (undated) DEVICE — ARM DRAPE

## (undated) DEVICE — SOLIDIFIER MEDC 1200ML -- CONVERT TO 356117

## (undated) DEVICE — SUTURE PERMAHAND SZ 0 L30IN NONABSORBABLE BLK SILK BRAID A306H

## (undated) DEVICE — SUTURE MCRYL SZ 4-0 L27IN ABSRB UD L19MM PS-2 1/2 CIR PRIM Y426H

## (undated) DEVICE — HANDLE LT SNAP ON ULT DURABLE LENS FOR TRUMPF ALC DISPOSABLE

## (undated) DEVICE — TROCAR: Brand: KII SLEEVE

## (undated) DEVICE — 1200 GUARD II KIT W/5MM TUBE W/O VAC TUBE: Brand: GUARDIAN

## (undated) DEVICE — STRAP,POSITIONING,KNEE/BODY,FOAM,4X60": Brand: MEDLINE

## (undated) DEVICE — 3M™ IOBAN™ 2 ANTIMICROBIAL INCISE DRAPE 6650EZ: Brand: IOBAN™ 2

## (undated) DEVICE — GARMENT,MEDLINE,DVT,INT,CALF,MED, GEN2: Brand: MEDLINE

## (undated) DEVICE — TOWEL SURG W17XL27IN STD BLU COT NONFENESTRATED PREWASHED

## (undated) DEVICE — ELECTRO LUBE IS A SINGLE PATIENT USE DEVICE THAT IS INTENDED TO BE USED ON ELECTROSURGICAL ELECTRODES TO REDUCE STICKING.: Brand: KEY SURGICAL ELECTRO LUBE

## (undated) DEVICE — BASIN EMSIS 16OZ GRAPHITE PLAS KID SHP MOLD GRAD FOR ORAL

## (undated) DEVICE — SUTURE ETHLN SZ 2-0 L18IN NONABSORBABLE BLK L19MM PS-2 PRIM 593H

## (undated) DEVICE — TOTAL TRAY, 16FR 10ML SIL FOLEY, URN: Brand: MEDLINE

## (undated) DEVICE — COLUMN DRAPE

## (undated) DEVICE — INSUFFLATION NEEDLE: Brand: SURGINEEDLE

## (undated) DEVICE — TRI-LUMEN FILTERED TUBE SET WITH ACTIVATED CHARCOAL FILTER: Brand: AIRSEAL

## (undated) DEVICE — SURGICAL PROCEDURE KIT GEN LAPAROSCOPY LF

## (undated) DEVICE — NEEDLE SPNL 22GA L3.5IN BLK HUB S STL REG WALL FIT STYL W/

## (undated) DEVICE — PREP SKN CHLRAPRP APL 26ML STR --

## (undated) DEVICE — DERMABOND SKIN ADH 0.7ML -- DERMABOND ADVANCED 12/BX